# Patient Record
Sex: MALE | Race: WHITE | NOT HISPANIC OR LATINO | Employment: OTHER | URBAN - METROPOLITAN AREA
[De-identification: names, ages, dates, MRNs, and addresses within clinical notes are randomized per-mention and may not be internally consistent; named-entity substitution may affect disease eponyms.]

---

## 2017-03-01 ENCOUNTER — OFFICE VISIT (OUTPATIENT)
Dept: RADIATION ONCOLOGY | Facility: HOSPITAL | Age: 77
End: 2017-03-01

## 2017-03-01 ENCOUNTER — HOSPITAL ENCOUNTER (OUTPATIENT)
Dept: RADIATION ONCOLOGY | Facility: HOSPITAL | Age: 77
Setting detail: RADIATION/ONCOLOGY SERIES
Discharge: HOME | End: 2017-03-01

## 2017-03-01 VITALS
WEIGHT: 175 LBS | BODY MASS INDEX: 28.12 KG/M2 | TEMPERATURE: 98.3 F | RESPIRATION RATE: 16 BRPM | DIASTOLIC BLOOD PRESSURE: 72 MMHG | HEART RATE: 82 BPM | SYSTOLIC BLOOD PRESSURE: 115 MMHG | HEIGHT: 66 IN | OXYGEN SATURATION: 98 %

## 2017-03-01 DIAGNOSIS — C61 PROSTATE CANCER (HCC): Primary | ICD-10-CM

## 2017-03-01 PROCEDURE — G0463 HOSPITAL OUTPT CLINIC VISIT: HCPCS

## 2017-03-01 RX ORDER — MEMANTINE HYDROCHLORIDE 10 MG/1
TABLET ORAL DAILY
COMMUNITY
Start: 2016-12-17 | End: 2018-11-13 | Stop reason: SDUPTHER

## 2017-03-01 RX ORDER — RANITIDINE 150 MG/1
TABLET ORAL 2 TIMES DAILY
COMMUNITY
Start: 2017-02-17 | End: 2018-08-06 | Stop reason: SDUPTHER

## 2017-03-01 NOTE — PROGRESS NOTES
FOLLOW UP NOTE    PATIENT:                                                      Gibson Mansfield  MEDICAL RECORD #:                        [id number]  :                                                          1940  COMPLETION DATE:   2016  DIAGNOSIS:     Prostate cancer    Staging form: Prostate, AJCC V7      Clinical stage from 2016: Stage I (T1c, N0, M0, PSA: Less than 10, Altno <= 6)       BRIEF HISTORY:    Follow-up visit.  Status post CyberKnife SBRT for low risk prostate cancer, Albers's 3+3= 6 with pretreatment PSA 3.1.  He tolerated treatment well.  Bowel disturbance has resolved with minimal residual gas or occasional cramping.  No urinary symptoms.  PSA 2016 was 0.8.    MEDICATIONS: Medication reconciliation for the patient was reviewed and confirmed in the electronic medical record.    Review of Systems   Constitutional: Positive for fatigue.   Gastrointestinal:        Gas/cramping occasionally    Genitourinary: Positive for nocturia.    Neurological: Positive for extremity weakness.   Hematological: Bruises/bleeds easily.       IPSS Questionnaire (AUA-7):  Over the past month…     1) Incomplete Emptying  How often have you had a sensation of not emptying your bladder?  0 - Not at all   2) Frequency  How often have you had to urinate less than every two hours? 0 - Not at all   3) Intermittency  How often have you found you stopped and started again several times when you urinated?  0 - Not at all   4) Urgency  How often have you found it difficult to postpone urination?  0 - Not at all   5) Weak Stream  How often have you had a weak urinary stream?  0 - Not at all   6) Straining  How often have you had to push or strain to begin urination?  0 - Not at all   7) Nocturia  How many times did you typically get up at night to urinate?  2 - 2 times   Total Score: 2         Quality of life due to urinary symptoms:  If you were to spend the rest of your life with your urinary  condition the way it is now, how would you feel about that? 0-Delighted   Urine Leakage (Incontinence) 0-No Leakage      Sexual Health Inventory  Current Status     1) How do you rate your confidence that you could achieve and keep an erection? 1-Very Low   2) When you had erections with sexual stimulation, how often were your erections hard enough for penetration (entering your partner)? 0-No sexual activity   3) During sexual intercourse, how often were you able to maintain your erection after you had penetrated (entered) into your partner? 0-Did not attempt intercourse   4) During sexual intercourse, how difficult was it to maintain your erection to completion of intercourse? 0-Did not attempt intercourse   5) When you attempted sexual intercourse, how often was it satisfactory to you? 0-No sexual activity   Total Score: 1         Bowel Health Inventory  Current Status: 1-Mild diarrhea and/or mild cramping and/or bowel movements more than 5 times daily and/or slight rectal discharge or bleeding             KPS 70%    Physical Exam   Constitutional: He is oriented to person, place, and time. He appears well-developed and well-nourished.   HENT:   Head: Normocephalic and atraumatic.   Neck: Normal range of motion. Neck supple.   Cardiovascular: Normal rate, regular rhythm and normal heart sounds.    No murmur heard.  Pulmonary/Chest: Effort normal and breath sounds normal. He has no wheezes. He has no rales.   Abdominal: Soft. Bowel sounds are normal. He exhibits no distension. There is no hepatosplenomegaly. There is no tenderness.   Genitourinary: Prostate is not enlarged (right lobe is still slightly more rounded and elevated but not enlarged.  No induration.  Seminal vesicles not palpable.) and not tender.   Musculoskeletal: Normal range of motion. He exhibits no edema or tenderness.   Lymphadenopathy:     He has no cervical adenopathy.     He has no axillary adenopathy.        Right: No supraclavicular  "adenopathy present.        Left: No supraclavicular adenopathy present.   Neurological: He is alert and oriented to person, place, and time. He has normal strength. No sensory deficit.   Skin: Skin is warm and dry.   Psychiatric: He has a normal mood and affect. His behavior is normal. Judgment and thought content normal.   Nursing note and vitals reviewed.      VITAL SIGNS:   Vitals:    03/01/17 1446   BP: 115/72   Pulse: 82   Resp: 16   Temp: 98.3 °F (36.8 °C)   TempSrc: Temporal Artery    SpO2: 98%   Weight: 175 lb (79.4 kg)   Height: 66\" (167.6 cm)   PainSc: 0-No pain       The following portions of the patient's history were reviewed and updated as appropriate: allergies, current medications, past family history, past medical history, past social history, past surgical history and problem list.         Prostate cancer [C61]    IMPRESSION:  Prostate cancer, excellent tolerance and early response to SBRT.    RECOMMENDATIONS:  Continue urologic surveillance with Dr. Lehman.     Return in about 1 year (around 3/1/2018).     Ramon Mcgowan MD    Errors in dictation may reflect use of voice recognition software and not all errors in transcription may have been detected prior to signing.  "

## 2017-06-08 DIAGNOSIS — R55 SYNCOPE, UNSPECIFIED SYNCOPE TYPE: Primary | ICD-10-CM

## 2017-06-08 DIAGNOSIS — I48.91 ATRIAL FIBRILLATION, UNSPECIFIED TYPE (HCC): ICD-10-CM

## 2017-06-09 ENCOUNTER — OFFICE VISIT (OUTPATIENT)
Dept: CARDIOLOGY | Facility: CLINIC | Age: 77
End: 2017-06-09

## 2017-06-09 DIAGNOSIS — I48.91 ATRIAL FIBRILLATION, UNSPECIFIED TYPE (HCC): ICD-10-CM

## 2017-06-09 PROCEDURE — 93225 XTRNL ECG REC<48 HRS REC: CPT | Performed by: INTERNAL MEDICINE

## 2017-06-22 ENCOUNTER — OFFICE VISIT (OUTPATIENT)
Dept: CARDIOLOGY | Facility: CLINIC | Age: 77
End: 2017-06-22

## 2017-06-22 DIAGNOSIS — I48.0 PAROXYSMAL ATRIAL FIBRILLATION (HCC): ICD-10-CM

## 2017-06-22 PROCEDURE — 93227 XTRNL ECG REC<48 HR R&I: CPT | Performed by: INTERNAL MEDICINE

## 2017-06-29 ENCOUNTER — OFFICE VISIT (OUTPATIENT)
Dept: CARDIOLOGY | Facility: CLINIC | Age: 77
End: 2017-06-29

## 2017-06-29 VITALS
HEIGHT: 66 IN | WEIGHT: 169.4 LBS | HEART RATE: 68 BPM | BODY MASS INDEX: 27.23 KG/M2 | SYSTOLIC BLOOD PRESSURE: 140 MMHG | DIASTOLIC BLOOD PRESSURE: 72 MMHG

## 2017-06-29 DIAGNOSIS — E78.5 HYPERLIPIDEMIA, UNSPECIFIED HYPERLIPIDEMIA TYPE: ICD-10-CM

## 2017-06-29 DIAGNOSIS — I48.0 PAROXYSMAL ATRIAL FIBRILLATION (HCC): Primary | ICD-10-CM

## 2017-06-29 DIAGNOSIS — I10 ESSENTIAL HYPERTENSION: ICD-10-CM

## 2017-06-29 PROCEDURE — 99213 OFFICE O/P EST LOW 20 MIN: CPT | Performed by: INTERNAL MEDICINE

## 2017-06-29 PROCEDURE — 93000 ELECTROCARDIOGRAM COMPLETE: CPT | Performed by: INTERNAL MEDICINE

## 2017-06-29 NOTE — PROGRESS NOTES
OFFICE FOLLOW UP     06/29/2017     Gibson Mansfield  [address] [facility] [address]  [phone]    1940    Galindo Flor MD    Gibson Mansfield is a 76 y.o. male.    Chief Complaint: Follow-up for Hypertension and Atrial Fibrillation    PROBLEM LIST:  1. Paroxysmal atrial fibrillation:  a. Symptoms of fatigue over the last month with EKG on 08/06/2015 revealing atrial fibrillation with controlled rate.  b. CHADS-VASc = 4.   c. Apixaban therapy instituted.  d. Echo September 2015: EF 55-60%, moderate aortic calcification, mild AI  e. 24-hour Holter, A-fib rate  bpm, rate>120 (intermittent) 17 of 24 hours (6/2017)        1. All symptoms abated after discontinuation of beta blocker.  2. Hypertension.   3. Hyperlipidemia.   4. Diabetes mellitus type 2, diet controlled.   5. Osteoarthritis.   6. Prostate cancer:  a. “Watch and wait” by primary care.   7. History of PUD.   8. Dementia.    Allergies   Allergen Reactions   • Penicillins    • Sulfa Antibiotics          Current Outpatient Prescriptions:   •  apixaban (ELIQUIS) 5 MG tablet tablet, Take  by mouth. As directed, Disp: , Rfl:   •  Cholecalciferol (VITAMIN D-3 PO), Take  by mouth. As directed, Disp: , Rfl:   •  memantine (NAMENDA) 10 MG tablet, , Disp: , Rfl:   •  Multiple Vitamins-Minerals (PRESERVISION AREDS 2 PO), Take  by mouth. As directed, Disp: , Rfl:   •  PRAVASTATIN SODIUM PO, Take  by mouth daily., Disp: , Rfl:   •  raNITIdine (ZANTAC) 150 MG tablet, , Disp: , Rfl:   •  Rivastigmine (EXELON TD), Place  on the skin. As directed, Disp: , Rfl:   •  SPIRONOLACTONE PO, Take  by mouth. As directed, Disp: , Rfl:   •  VORTIOXETINE HBR PO, Take  by mouth. As directed, Disp: , Rfl:     History of Present Illness:    Gibson Mansfield is a 76 y.o. male returning today for a follow-up of PAF, HTN and HLD. The patient is generally improving from a cardiac standpoint. The patient had a 24-hour Holter monitor on 6/09/2017, and at that time he was on Coreg  "and he was not feeling well. After stopping Coreg, he feels much better and does not experience tachypalpitations. See the above Holter report for details of rhythm, rate.      The following portions of the patient's history were reviewed and updated as appropriate: allergies, current medications and problem list.    Pertinent positives as listed in the HPI.  All other systems reviewed and negative.    Objective:     Vitals:    06/29/17 1520 06/29/17 1521   BP: 126/84 140/72   BP Location: Left arm    Patient Position: Sitting    Pulse: 81 68   Weight: 169 lb 6.4 oz (76.8 kg)    Height: 66\" (167.6 cm)        Physical Exam   Constitutional: He is oriented to person, place, and time.   Neck: No JVD present. No thyromegaly present.   Cardiovascular: Normal rate.  An irregularly irregular rhythm present.   Exam reveals no gallop, murmur, or rub.   Pulmonary/Chest:   No wheezes, crackles, or rhonchi.   Musculoskeletal:   No peripheral edema, no clubbing, or cyanosis    Neurological: He is alert and oriented to person, place, and time.   No focal abnormalities in sensation or strength    Vitals reviewed.       Diagnostic Data:      Lab Results (05/25/2017)  Normal CBC and CMP      ECG 12 Lead  Date/Time: 6/29/2017 5:04 PM  Performed by: VINNY DALY  Authorized by: VINNY DALY   Previous ECG: no previous ECG available  Rhythm: atrial fibrillation  Conduction: right bundle branch block  Clinical impression: abnormal ECG            Assessment and Plan:      After I reviewed Mr. Fraser's Holter recording, I felt that he would benefit from some cautious increase in his beta blocker.  Nonetheless, today, he tells me that he stopped all of this beta blockers after the event recorder and that symptoms it completely vanished.  He now feels \"great\".  He is anticoagulated to prevent stroke.  He is normotensive.  Based on his current clinical status I do not think that we need to make further changes are carryout a further " evaluation.  He is reassured of this.    Keep appointment in December    Scribed for Stanley Thomas MD by Coral Glass. 6/29/2017  5:02 PM    I, Stanley Thomas MD, [facility], [facility], personally performed the services described in this documentation as scribed by the above named individual in my presence, and it is both accurate and complete. At 3:58 PM on 06/29/2017    EMR Dragon/Transcription disclaimer:   Much of this encounter note is an electronic transcription/translation of spoken language to printed text. The electronic translation of spoken language may permit erroneous, or at times, nonsensical words or phrases to be inadvertently transcribed; Although I have reviewed the note for such errors, some may still exist.

## 2017-07-05 DIAGNOSIS — R55 SYNCOPE, UNSPECIFIED SYNCOPE TYPE: ICD-10-CM

## 2017-07-05 DIAGNOSIS — I48.91 ATRIAL FIBRILLATION, UNSPECIFIED TYPE (HCC): ICD-10-CM

## 2017-12-14 ENCOUNTER — OFFICE VISIT (OUTPATIENT)
Dept: CARDIOLOGY | Facility: CLINIC | Age: 77
End: 2017-12-14

## 2017-12-14 VITALS
SYSTOLIC BLOOD PRESSURE: 113 MMHG | DIASTOLIC BLOOD PRESSURE: 77 MMHG | BODY MASS INDEX: 28.49 KG/M2 | HEART RATE: 83 BPM | HEIGHT: 65 IN | WEIGHT: 171 LBS

## 2017-12-14 DIAGNOSIS — E78.5 HYPERLIPIDEMIA, UNSPECIFIED HYPERLIPIDEMIA TYPE: ICD-10-CM

## 2017-12-14 DIAGNOSIS — I48.0 PAROXYSMAL ATRIAL FIBRILLATION (HCC): Primary | ICD-10-CM

## 2017-12-14 DIAGNOSIS — I10 ESSENTIAL HYPERTENSION: ICD-10-CM

## 2017-12-14 PROCEDURE — 99213 OFFICE O/P EST LOW 20 MIN: CPT | Performed by: INTERNAL MEDICINE

## 2017-12-14 RX ORDER — VORTIOXETINE 10 MG/1
TABLET, FILM COATED ORAL DAILY
COMMUNITY
Start: 2017-11-15 | End: 2018-11-13

## 2017-12-14 NOTE — PROGRESS NOTES
OFFICE FOLLOW UP     Date of Encounter:2017     Name: Gibson Mansfield  : 1940  Address: [address] [place] [address] [address] [place] [address]  Phone: [phone]    PCP: Galindo Flor MD  [address] [facility] [address]  [facility] [address]    Gibson Mansfield is a 77 y.o. male.    Chief Complaint: Follow up of PAF, HTN, HLD    Problem List:   1. Paroxysmal atrial fibrillation:  a. Symptoms of fatigue over the last month with EKG on 2015 revealing atrial fibrillation with controlled rate.  b. CHADS-VASc = 4.   c. Apixaban therapy instituted.  d. Echo 2015: EF 55-60%, moderate aortic calcification, mild AI  e. 24-hour Holter, A-fib rate  bpm, rate>120 (intermittent) 17 of 24 hours (2017)  f. All symptoms abated after discontinuation of beta blocker.  2. Hypertension.   3. Hyperlipidemia.   4. Diabetes mellitus type 2, diet controlled.   5. Osteoarthritis.   6. Prostate cancer:  a. “Watch and wait” by primary care.   7. History of PUD.   8. Dementia.      Allergies   Allergen Reactions   • Penicillins    • Sulfa Antibiotics        Current Medications:  •  apixaban 5 MG by mouth Every 12 (Twelve) Hours  •  Cholecalciferol by mouth Daily. As directed   •  memantine 10 MG tablet, Daily.  •  Multiple Vitamins-Minerals by mouth Daily. As directed   •  PRAVASTATIN 80 mg by mouth Daily.  •  raNITIdine  150 MG-2 (Two) Times a Day  •  Rivastigmine Place  on the skin. As directed   •  TRINTELLIX 10 MG tablet, Daily.    History of Present Illness:           Mr. Mansfield returns today for 6 month follow up. Since his last visit he reports he has been doing well and remained asymptomatic. He denies palpitations, no chest pain or shortness of breath. He has not had any recent falls. He remains on Eliquis for stroke prophylaxis.     The following portions of the patient's history were reviewed and updated as appropriate: allergies, current medications and problem list.    HPI: Pertinent positives as listed in the  "HPI.  All other systems reviewed and negative.    Objective:  Vitals:    12/14/17 1357 12/14/17 1403 12/14/17 1404   BP: 134/79 127/95 113/77   BP Location: Right arm Right arm Right arm   Patient Position: Sitting Standing Sitting   Pulse: 82 88 83   Weight: 77.6 kg (171 lb)     Height: 165.1 cm (65\")       Physical Exam:  GENERAL: Alert, cooperative, in no acute distress.   HEENT: Fundoscopic deferred, otherwise unremarkable.  NECK: No Jugular venous distention, adenopathy, or thyromegaly noted.   HEART: Irregular rhythm, normal rate, and + systolic murmur, no gallops, or rubs.   LUNGS: Clear to auscultation bilaterally. No wheezing, rales or ronchi.  ABDOMEN: Flat without evidence of organomegaly, masses, or tenderness.  NEUROLOGIC: No focal abnormalities involving strength or sensation are noted.   EXTREMITIES: No clubbing, cyanosis, or edema noted.     Diagnostic Data:  No new labs available.    Procedures      Assessment and Plan:     1. Atrial fibrillation: rate controlled, and anticoagulated with Eliquis 5mg BID  2. HTN: well controlled  3. Continue current medical regimen and follow up in 1 year or sooner as needed.       Scribed for Stanley Thomas MD by Belinda Fontaine PA-C. 12/14/2017 2:14 PM.  I, Stanley Thomas MD, [facility], [facility], personally performed the services described in this documentation as scribed by the above named individual in my presence, and it is both accurate and complete. At 8:56 PM on 12/14/2017               "

## 2018-02-05 ENCOUNTER — HOSPITAL ENCOUNTER (OUTPATIENT)
Dept: CT IMAGING | Facility: HOSPITAL | Age: 78
Discharge: HOME | End: 2018-02-05
Admitting: PHYSICIAN ASSISTANT

## 2018-02-05 DIAGNOSIS — Z91.81 STATUS POST FALL: ICD-10-CM

## 2018-02-05 PROCEDURE — 70450 CT HEAD/BRAIN W/O DYE: CPT

## 2018-03-05 ENCOUNTER — HOSPITAL ENCOUNTER (OUTPATIENT)
Dept: RADIATION ONCOLOGY | Facility: HOSPITAL | Age: 78
Setting detail: RADIATION/ONCOLOGY SERIES
Discharge: HOME | End: 2018-03-05

## 2018-03-05 ENCOUNTER — OFFICE VISIT (OUTPATIENT)
Dept: RADIATION ONCOLOGY | Facility: HOSPITAL | Age: 78
End: 2018-03-05

## 2018-03-05 VITALS
TEMPERATURE: 98.3 F | WEIGHT: 177.5 LBS | DIASTOLIC BLOOD PRESSURE: 88 MMHG | HEART RATE: 95 BPM | BODY MASS INDEX: 29.54 KG/M2 | RESPIRATION RATE: 20 BRPM | OXYGEN SATURATION: 97 % | SYSTOLIC BLOOD PRESSURE: 175 MMHG

## 2018-03-05 DIAGNOSIS — C61 PROSTATE CANCER (HCC): Primary | ICD-10-CM

## 2018-03-05 RX ORDER — PRAVASTATIN SODIUM 80 MG/1
TABLET ORAL
COMMUNITY
Start: 2018-01-24 | End: 2018-08-06 | Stop reason: SDUPTHER

## 2018-03-05 NOTE — PROGRESS NOTES
FOLLOW UP NOTE    PATIENT:                                                      Gibson Mansfield  MEDICAL RECORD #:                        [id number]  :                                                          1940  COMPLETION DATE:    16  DIAGNOSIS:     Prostate cancer    Staging form: Prostate, AJCC V7    - Clinical stage from 2016: Stage I (T1c, N0, M0, PSA: Less than 10, Alton <= 6) - Signed by Ramon Mcgowan MD on 2016      BRIEF HISTORY:    Routine follow-up visit status post CyberKnife SBRT for low risk prostate cancer.  Most recent PSA was 0.1 ng/ml.    He's having no urinary symptoms, bowel symptoms, progressive pain or other complaints.      MEDICATIONS: Medication reconciliation for the patient was reviewed and confirmed in the electronic medical record.    Review of Systems   Constitutional: Positive for fatigue.   HENT:   Positive for hearing loss and tinnitus.    Eyes: Negative.    Respiratory: Positive for cough (sinus drainage).    Cardiovascular: Negative.    Gastrointestinal: Negative.    Endocrine: Negative.    Genitourinary: Negative.     Musculoskeletal: Positive for back pain.   Skin: Negative.    Neurological: Positive for dizziness.   Hematological: Bruises/bleeds easily.   Psychiatric/Behavioral: Negative.             IPSS Questionnaire (AUA-7):  Over the past month…      1) Incomplete Emptying  How often have you had a sensation of not emptying your bladder?  0 - Not at all   2) Frequency  How often have you had to urinate less than every two hours? 0 - Not at all   3) Intermittency  How often have you found you stopped and started again several times when you urinated?  0 - Not at all   4) Urgency  How often have you found it difficult to postpone urination?  0 - Not at all   5) Weak Stream  How often have you had a weak urinary stream?  0 - Not at all   6) Straining  How often have you had to push or strain to begin urination?  0 - Not at all   7) Nocturia  How  many times did you typically get up at night to urinate?  2 - 2 times   Total Score: 2           Quality of life due to urinary symptoms:  If you were to spend the rest of your life with your urinary condition the way it is now, how would you feel about that? 0-Delighted   Urine Leakage (Incontinence) 0-No Leakage       Sexual Health Inventory  Current Status      1) How do you rate your confidence that you could achieve and keep an erection? 1-Very Low   2) When you had erections with sexual stimulation, how often were your erections hard enough for penetration (entering your partner)? 0-No sexual activity   3) During sexual intercourse, how often were you able to maintain your erection after you had penetrated (entered) into your partner? 0-Did not attempt intercourse   4) During sexual intercourse, how difficult was it to maintain your erection to completion of intercourse? 0-Did not attempt intercourse   5) When you attempted sexual intercourse, how often was it satisfactory to you? 0-No sexual activity   Total Score: 1           Bowel Health Inventory  Current Status: 1-Mild diarrhea and/or mild cramping and/or bowel movements more than 5 times daily and/or slight rectal discharge or bleeding                   KPS 70%    Physical Exam   Constitutional: He is oriented to person, place, and time. He appears well-developed and well-nourished.   HENT:   Head: Normocephalic and atraumatic.   Neck: Normal range of motion. Neck supple.   Cardiovascular: Normal rate, regular rhythm and normal heart sounds.    No murmur heard.  Pulmonary/Chest: Effort normal and breath sounds normal. He has no wheezes. He has no rales.   Abdominal: Soft. Bowel sounds are normal. He exhibits no distension. There is no hepatosplenomegaly. There is no tenderness.   Genitourinary: Prostate is not enlarged and not tender.   Musculoskeletal: Normal range of motion. He exhibits no edema or tenderness.   Lymphadenopathy:     He has no cervical  adenopathy.     He has no axillary adenopathy.        Right: No supraclavicular adenopathy present.        Left: No supraclavicular adenopathy present.   Neurological: He is alert and oriented to person, place, and time. He has normal strength. No sensory deficit.   Skin: Skin is warm and dry.   Psychiatric: He has a normal mood and affect. His behavior is normal. Judgment and thought content normal.   Nursing note and vitals reviewed.      VITAL SIGNS:   Vitals:    03/05/18 1416   BP: 175/88   Pulse: 95   Resp: 20   Temp: 98.3 °F (36.8 °C)   TempSrc: Temporal Artery    SpO2: 97%   Weight: 80.5 kg (177 lb 8 oz)   PainSc: 0-No pain       The following portions of the patient's history were reviewed and updated as appropriate: allergies, current medications, past family history, past medical history, past social history, past surgical history and problem list.         Gibson was seen today for prostate cancer.    Diagnoses and all orders for this visit:    Prostate cancer       IMPRESSION:  Prostate cancer, Cleves score 3+3 = 6, 1 1/2 years after definitive treatment with SBRT.    He is in biochemical remission status.  He maintains excellent urinary function.    RECOMMENDATIONS:  He plans to continue urologic surveillance under the care of Dr. Lehman and his primary physician.      Return if symptoms worsen or fail to improve.    Ramon Mcgowan MD    Errors in dictation may reflect use of voice recognition software and not all errors in transcription may have been detected prior to signing.

## 2018-05-17 ENCOUNTER — OFFICE VISIT (OUTPATIENT)
Dept: FAMILY MEDICINE CLINIC | Facility: CLINIC | Age: 78
End: 2018-05-17

## 2018-05-17 VITALS
BODY MASS INDEX: 28 KG/M2 | WEIGHT: 174.2 LBS | HEART RATE: 79 BPM | RESPIRATION RATE: 18 BRPM | DIASTOLIC BLOOD PRESSURE: 72 MMHG | SYSTOLIC BLOOD PRESSURE: 128 MMHG | OXYGEN SATURATION: 98 % | HEIGHT: 66 IN

## 2018-05-17 DIAGNOSIS — R79.9 ABNORMAL FINDING OF BLOOD CHEMISTRY: ICD-10-CM

## 2018-05-17 DIAGNOSIS — I48.0 PAROXYSMAL ATRIAL FIBRILLATION (HCC): ICD-10-CM

## 2018-05-17 DIAGNOSIS — E78.01 FAMILIAL HYPERCHOLESTEROLEMIA: ICD-10-CM

## 2018-05-17 DIAGNOSIS — I10 ESSENTIAL HYPERTENSION: Primary | ICD-10-CM

## 2018-05-17 DIAGNOSIS — F01.50 VASCULAR DEMENTIA WITHOUT BEHAVIORAL DISTURBANCE (HCC): ICD-10-CM

## 2018-05-17 DIAGNOSIS — Z23 IMMUNIZATION DUE: ICD-10-CM

## 2018-05-17 LAB
ALBUMIN SERPL-MCNC: 4 G/DL (ref 3.2–4.8)
ALBUMIN/GLOB SERPL: 1.5 G/DL (ref 1.5–2.5)
ALP SERPL-CCNC: 87 U/L (ref 25–100)
ALT SERPL W P-5'-P-CCNC: 28 U/L (ref 7–40)
ANION GAP SERPL CALCULATED.3IONS-SCNC: 8 MMOL/L (ref 3–11)
ARTICHOKE IGE QN: 95 MG/DL (ref 0–130)
AST SERPL-CCNC: 29 U/L (ref 0–33)
BASOPHILS # BLD AUTO: 0.03 10*3/MM3 (ref 0–0.2)
BASOPHILS NFR BLD AUTO: 0.6 % (ref 0–1)
BILIRUB SERPL-MCNC: 0.7 MG/DL (ref 0.3–1.2)
BUN BLD-MCNC: 14 MG/DL (ref 9–23)
BUN/CREAT SERPL: 14 (ref 7–25)
CALCIUM SPEC-SCNC: 9.5 MG/DL (ref 8.7–10.4)
CHLORIDE SERPL-SCNC: 108 MMOL/L (ref 99–109)
CHOLEST SERPL-MCNC: 159 MG/DL (ref 0–200)
CO2 SERPL-SCNC: 28 MMOL/L (ref 20–31)
CREAT BLD-MCNC: 1 MG/DL (ref 0.6–1.3)
DEPRECATED RDW RBC AUTO: 48.2 FL (ref 37–54)
EOSINOPHIL # BLD AUTO: 0.08 10*3/MM3 (ref 0–0.3)
EOSINOPHIL NFR BLD AUTO: 1.5 % (ref 0–3)
ERYTHROCYTE [DISTWIDTH] IN BLOOD BY AUTOMATED COUNT: 14.2 % (ref 11.3–14.5)
GFR SERPL CREATININE-BSD FRML MDRD: 72 ML/MIN/1.73
GLOBULIN UR ELPH-MCNC: 2.6 GM/DL
GLUCOSE BLD-MCNC: 107 MG/DL (ref 70–100)
HBA1C MFR BLD: 5.2 % (ref 4.8–5.6)
HCT VFR BLD AUTO: 40.3 % (ref 38.9–50.9)
HDLC SERPL-MCNC: 55 MG/DL (ref 40–60)
HGB BLD-MCNC: 12.6 G/DL (ref 13.1–17.5)
IMM GRANULOCYTES # BLD: 0 10*3/MM3 (ref 0–0.03)
IMM GRANULOCYTES NFR BLD: 0 % (ref 0–0.6)
LYMPHOCYTES # BLD AUTO: 0.77 10*3/MM3 (ref 0.6–4.8)
LYMPHOCYTES NFR BLD AUTO: 14.7 % (ref 24–44)
MCH RBC QN AUTO: 29.4 PG (ref 27–31)
MCHC RBC AUTO-ENTMCNC: 31.3 G/DL (ref 32–36)
MCV RBC AUTO: 93.9 FL (ref 80–99)
MONOCYTES # BLD AUTO: 0.38 10*3/MM3 (ref 0–1)
MONOCYTES NFR BLD AUTO: 7.2 % (ref 0–12)
NEUTROPHILS # BLD AUTO: 3.99 10*3/MM3 (ref 1.5–8.3)
NEUTROPHILS NFR BLD AUTO: 76 % (ref 41–71)
PLATELET # BLD AUTO: 137 10*3/MM3 (ref 150–450)
PMV BLD AUTO: 11.6 FL (ref 6–12)
POTASSIUM BLD-SCNC: 4.2 MMOL/L (ref 3.5–5.5)
PROT SERPL-MCNC: 6.6 G/DL (ref 5.7–8.2)
RBC # BLD AUTO: 4.29 10*6/MM3 (ref 4.2–5.76)
SODIUM BLD-SCNC: 144 MMOL/L (ref 132–146)
TRIGL SERPL-MCNC: 101 MG/DL (ref 0–150)
TSH SERPL DL<=0.05 MIU/L-ACNC: 1.3 MIU/ML (ref 0.35–5.35)
URATE SERPL-MCNC: 6.7 MG/DL (ref 3.7–9.2)
WBC NRBC COR # BLD: 5.25 10*3/MM3 (ref 3.5–10.8)

## 2018-05-17 PROCEDURE — 80053 COMPREHEN METABOLIC PANEL: CPT | Performed by: FAMILY MEDICINE

## 2018-05-17 PROCEDURE — 90732 PPSV23 VACC 2 YRS+ SUBQ/IM: CPT | Performed by: FAMILY MEDICINE

## 2018-05-17 PROCEDURE — G0009 ADMIN PNEUMOCOCCAL VACCINE: HCPCS | Performed by: FAMILY MEDICINE

## 2018-05-17 PROCEDURE — 85025 COMPLETE CBC W/AUTO DIFF WBC: CPT | Performed by: FAMILY MEDICINE

## 2018-05-17 PROCEDURE — 84550 ASSAY OF BLOOD/URIC ACID: CPT | Performed by: FAMILY MEDICINE

## 2018-05-17 PROCEDURE — 84443 ASSAY THYROID STIM HORMONE: CPT | Performed by: FAMILY MEDICINE

## 2018-05-17 PROCEDURE — 83036 HEMOGLOBIN GLYCOSYLATED A1C: CPT | Performed by: FAMILY MEDICINE

## 2018-05-17 PROCEDURE — 99204 OFFICE O/P NEW MOD 45 MIN: CPT | Performed by: FAMILY MEDICINE

## 2018-05-17 PROCEDURE — 80061 LIPID PANEL: CPT | Performed by: FAMILY MEDICINE

## 2018-05-17 PROCEDURE — 36415 COLL VENOUS BLD VENIPUNCTURE: CPT | Performed by: FAMILY MEDICINE

## 2018-05-17 NOTE — PROGRESS NOTES
Kolby Mansfield is a 77 y.o. male.     History of Present Illness   He is a new patient.  He is accompanied by his wife Lynette of 58 years.  He describes previous and recent care with Dr. Gavin Flor (Internist), Dr. GLADYS Thomas (Cardiologist), Dr. Ramon Mcgowan (Radiation Oncology) and Dr. Lehman (Urology).  He is here to follow up on his chronic medical problems which include HTN, HLD, Afib and dementia.  He had an AWME on October 24, 2017 by Dr. Flor but did not receive his PPV23.  He is tolerating all of his medications well with no adverse events.  He reports good blood pressure control off of his previously prescribed blood pressure medication.  He denies bleeding, unusual headaches, chest pain or acute dyspnea.  He tries to follow a heart healthy diet and walks for exercise.  He is interested in updating his immunizations.  He is anticipating some lab evaluation.  He is needing some medication refills.  He voices no acute symptoms.    Review of Systems   Constitutional: Negative for chills and fever.   HENT: Negative for mouth sores, nosebleeds and trouble swallowing.    Eyes: Negative for visual disturbance.   Respiratory: Negative for cough and shortness of breath.    Cardiovascular: Negative for chest pain, palpitations and leg swelling.   Gastrointestinal: Negative for abdominal pain, diarrhea, nausea and vomiting.   Genitourinary: Negative for difficulty urinating.   Musculoskeletal: Positive for back pain. Negative for arthralgias.   Skin: Negative for rash.   Neurological: Negative for headaches.   Hematological: Does not bruise/bleed easily.   Psychiatric/Behavioral: The patient is not nervous/anxious.    All other systems reviewed and are negative.      Objective   Physical Exam   Constitutional: He is oriented to person, place, and time. He appears well-developed and well-nourished. He is cooperative.   HENT:   Head: Normocephalic.   Right Ear: External ear normal. Decreased hearing is  noted.   Left Ear: External ear normal. Decreased hearing is noted.   Nose: Nose normal.   Mouth/Throat: Oropharynx is clear and moist and mucous membranes are normal.   Hearing aids   Eyes: Conjunctivae and EOM are normal. Pupils are equal, round, and reactive to light. No scleral icterus.   Neck: Trachea normal and normal range of motion. Neck supple. No JVD present. Carotid bruit is not present. No thyromegaly present.   Cardiovascular: Normal rate and intact distal pulses.  An irregular rhythm present. Exam reveals distant heart sounds.    Pulmonary/Chest: Effort normal and breath sounds normal. He has no wheezes.   Abdominal: Soft. Bowel sounds are normal. There is no hepatosplenomegaly. There is no tenderness.   Musculoskeletal: Normal range of motion.        Thoracic back: He exhibits deformity.   Lymphadenopathy:     He has no cervical adenopathy.   Neurological: He is alert and oriented to person, place, and time. He has normal strength and normal reflexes. No sensory deficit. Gait abnormal.   Skin: Skin is warm and dry.   Psychiatric: He has a normal mood and affect. His speech is normal and behavior is normal. Judgment and thought content normal. Cognition and memory are normal.   Nursing note and vitals reviewed.      Assessment/Plan   Diagnoses and all orders for this visit:    Essential hypertension  -     POC Urinalysis Dipstick, Automated  -     Comprehensive Metabolic Panel  -     CBC & Differential  -     Lipid Panel  -     TSH  -     Hemoglobin A1c  -     Uric Acid  - Healthy heart diet  - Daily aerobic exercise  - Routine blood pressure monitoring  - [place] Heart Disease and Stroke Prevention Task Force pamphlet reviewed and administered.    Familial hypercholesterolemia  -     POC Urinalysis Dipstick, Automated  -     Comprehensive Metabolic Panel  -     Lipid Panel  - Pravastatin  - Low cholesterol diet (< 200 mg / day)  - Daily aerobic exercise  - Aspirin 81 mg po once daily    Paroxysmal  atrial fibrillation  -     Comprehensive Metabolic Panel  -     CBC & Differential  -     TSH  -     Hemoglobin A1c  - Eliquis  - Report any excessive bruising or bleeding concerns    Vascular dementia without behavioral disturbance  -     POC Urinalysis Dipstick, Automated  -     Comprehensive Metabolic Panel  -     CBC & Differential  -     Lipid Panel  -     TSH  -     Hemoglobin A1c  -     Uric Acid  -     Namenda  - Exelon  - Trintellix    Immunization due  -     Pneumococcal Polysaccharide Vaccine 23-Valent IM  - Vaccine counseling and current VIS is provided for immunizations administered today.    RTC in 6 months.

## 2018-07-09 ENCOUNTER — OFFICE VISIT (OUTPATIENT)
Dept: FAMILY MEDICINE CLINIC | Facility: CLINIC | Age: 78
End: 2018-07-09

## 2018-07-09 VITALS
HEART RATE: 71 BPM | BODY MASS INDEX: 27.97 KG/M2 | RESPIRATION RATE: 18 BRPM | HEIGHT: 66 IN | WEIGHT: 174 LBS | TEMPERATURE: 98 F | SYSTOLIC BLOOD PRESSURE: 130 MMHG | OXYGEN SATURATION: 99 % | DIASTOLIC BLOOD PRESSURE: 78 MMHG

## 2018-07-09 DIAGNOSIS — G95.9 LUMBAR MYELOPATHY (HCC): Primary | ICD-10-CM

## 2018-07-09 PROCEDURE — 99214 OFFICE O/P EST MOD 30 MIN: CPT | Performed by: FAMILY MEDICINE

## 2018-07-09 NOTE — PROGRESS NOTES
"Subjective   Gibson Mansfield is a 77 y.o. male.     History of Present Illness   The patient is here today with his.  States he is getting around very little these days.  States he is having weakness in his knees and hips. States in February he was in the bathroom and fell backward into the tub and has weakness since that time. Has gradually getting worse.  States he is having trouble getting in and out of the car.  He has had back surgery on 3 different time. The last one in 2005.  Denies any chest pain or shortness of breath.    Also c/o right ear drainage.      The following portions of the patient's history were reviewed and updated as appropriate: allergies, current medications, past social history and problem list.    Review of Systems   Respiratory: Negative for shortness of breath.    Cardiovascular: Negative for chest pain.   Neurological: Positive for weakness (legs).       Objective   /78   Pulse 71   Temp 98 °F (36.7 °C) (Tympanic)   Resp 18   Ht 167.6 cm (66\")   Wt 78.9 kg (174 lb)   SpO2 99%   BMI 28.08 kg/m²   Physical Exam   Constitutional: He is oriented to person, place, and time. He appears well-developed and well-nourished.   HENT:   Right Ear: There is drainage.   Left Ear: External ear normal.   Cardiovascular: Normal rate and regular rhythm.    Pulmonary/Chest: Effort normal and breath sounds normal.   Musculoskeletal:   Severe kyphoscoliosis.  Diminished deep tendon reflexes at the ankles and knees.  Decreased sensation in both lower extremities.   Neurological: He is alert and oriented to person, place, and time. He displays abnormal reflex.   The patient has grade 2 out of 5 strength with extension at the hips.  This is symmetrical.  He has a grade 2/5 flexion strength at both hips.  This is symmetrical.  He has grade 2/5 extension strength at each knee.  This is symmetrical.  His grade 2/5 flexion strength of both knees.  This is symmetrical as well as.   Nursing note and " vitals reviewed.      Assessment/Plan   Problem List Items Addressed This Visit     None      Visit Diagnoses     Lumbar myelopathy (CMS/HCC)    -  Primary      I believe the patient has a lumbar myelopathy causing the sensory changes in both lower extremities as well as a weakness in both lower extremities.  I suspect that these findings are due to his severe lumbar scoliosis.        Drink plenty fluids.    Do some leg strengthening exercises daily.    Check an MRI of the lumbar spine.    Refer to Physical Therapy.    Follow up as needed.                Scribed for Dr Neo Cottrell by Enma Douglas CMA.          I, Neo Cottrell MD, personally performed the services described in this documentation, as scribed by Enma Douglas in my presence, and is both accurate and complete.

## 2018-07-12 ENCOUNTER — HOSPITAL ENCOUNTER (OUTPATIENT)
Dept: MRI IMAGING | Facility: HOSPITAL | Age: 78
Discharge: HOME | End: 2018-07-12
Attending: FAMILY MEDICINE | Admitting: FAMILY MEDICINE

## 2018-07-12 DIAGNOSIS — G95.9 LUMBAR MYELOPATHY (HCC): ICD-10-CM

## 2018-07-12 PROCEDURE — 72148 MRI LUMBAR SPINE W/O DYE: CPT

## 2018-07-18 DIAGNOSIS — R93.7 ABNORMAL MRI, LUMBAR SPINE: ICD-10-CM

## 2018-07-18 DIAGNOSIS — R29.898 WEAKNESS OF BOTH LEGS: Primary | ICD-10-CM

## 2018-08-06 ENCOUNTER — TELEPHONE (OUTPATIENT)
Dept: FAMILY MEDICINE CLINIC | Facility: CLINIC | Age: 78
End: 2018-08-06

## 2018-08-06 RX ORDER — RANITIDINE 150 MG/1
150 TABLET ORAL NIGHTLY
Qty: 90 TABLET | Refills: 2 | Status: SHIPPED | OUTPATIENT
Start: 2018-08-06 | End: 2019-01-01

## 2018-08-06 RX ORDER — PRAVASTATIN SODIUM 80 MG/1
80 TABLET ORAL DAILY
Qty: 90 TABLET | Refills: 2 | Status: SHIPPED | OUTPATIENT
Start: 2018-08-06 | End: 2019-01-01 | Stop reason: HOSPADM

## 2018-08-06 NOTE — TELEPHONE ENCOUNTER
----- Message from Nika Merrill MA sent at 8/6/2018  9:22 AM EDT -----  Contact: [phone]  Patient called requesting refills of Pravastatin and Ranitidine  Jameel

## 2018-08-08 ENCOUNTER — OFFICE VISIT (OUTPATIENT)
Dept: NEUROSURGERY | Facility: CLINIC | Age: 78
End: 2018-08-08

## 2018-08-08 VITALS — WEIGHT: 178 LBS | TEMPERATURE: 97.9 F | BODY MASS INDEX: 28.61 KG/M2 | HEIGHT: 66 IN

## 2018-08-08 DIAGNOSIS — M47.816 LUMBAR SPONDYLOSIS: ICD-10-CM

## 2018-08-08 DIAGNOSIS — R26.9 GAIT DIFFICULTY: ICD-10-CM

## 2018-08-08 DIAGNOSIS — R53.1 GENERALIZED WEAKNESS: Primary | ICD-10-CM

## 2018-08-08 DIAGNOSIS — M51.36 DDD (DEGENERATIVE DISC DISEASE), LUMBAR: ICD-10-CM

## 2018-08-08 PROCEDURE — 99203 OFFICE O/P NEW LOW 30 MIN: CPT | Performed by: NEUROLOGICAL SURGERY

## 2018-08-08 NOTE — PROGRESS NOTES
Patient: Gibson Manfsield  : 1940    Primary Care Provider: Samson Richard MD    Requesting Provider: As above        History    Chief Complaint: Lower extremity weakness and imbalance.    History of Present Illness: The patient is a 77-year-old retired gentleman has undergone 3 prior lumbar fusions by Dr. Fowler.  In February he fell and since then his balance has been poor and he's had difficulty with weakness in his legs.  He denies any numbness.  He has no back or leg pain.  He has no neck pain.  He has no voiding difficulties.  In many ways he simply feels weak all over.    Review of Systems   Constitutional: Positive for fatigue. Negative for activity change, appetite change, chills, diaphoresis, fever and unexpected weight change.   HENT: Negative for congestion, dental problem, drooling, ear discharge, ear pain, facial swelling, hearing loss, mouth sores, nosebleeds, postnasal drip, rhinorrhea, sinus pressure, sneezing, sore throat, tinnitus, trouble swallowing and voice change.    Eyes: Negative for photophobia, pain, discharge, redness, itching and visual disturbance.   Respiratory: Negative for apnea, cough, choking, chest tightness, shortness of breath, wheezing and stridor.    Cardiovascular: Positive for palpitations. Negative for chest pain and leg swelling.   Gastrointestinal: Negative for abdominal distention, abdominal pain, anal bleeding, blood in stool, constipation, diarrhea, nausea, rectal pain and vomiting.   Musculoskeletal: Positive for gait problem. Negative for arthralgias, back pain, joint swelling, myalgias, neck pain and neck stiffness.   Skin: Negative for color change, pallor, rash and wound.   Allergic/Immunologic: Negative for environmental allergies, food allergies and immunocompromised state.   Neurological: Positive for weakness. Negative for dizziness, tremors, seizures, syncope, facial asymmetry, speech difficulty, light-headedness, numbness and headaches.  "  Hematological: Negative for adenopathy. Does not bruise/bleed easily.   Psychiatric/Behavioral: Negative for agitation, behavioral problems, confusion, decreased concentration, dysphoric mood, self-injury, sleep disturbance and suicidal ideas. The patient is not nervous/anxious and is not hyperactive.        The patient's past medical history, past surgical history, family history, and social history have been reviewed at length in the electronic medical record.    Physical Exam:   Temp 97.9 °F (36.6 °C)   Ht 167.6 cm (66\")   Wt 80.7 kg (178 lb)   BMI 28.73 kg/m²   CONSTITUTIONAL: Patient is well-nourished, pleasant and appears stated age.  CV: Heart regular rate and rhythm without murmur, rub, or gallop.  Pedal pulses are distant but his feet are warm to touch.  PULMONARY: Lungs are clear to ascultation.  MUSCULOSKELETAL:  Neck tenderness to palpation is not observed.   ROM in neck is normal.  He harbors a moderate thoracic kyphosis.  Straight leg raising is negative.  Panda's signs are negative.  NEUROLOGICAL:  Orientation, memory, attention span, language function, and cognition have been examined and are intact.  Strength is intact in the upper and lower extremities to direct testing.  Muscle tone is normal throughout.  Station and gait are stooped and unsteady.  Sensation is intact to light touch testing throughout.  Deep tendon reflexes are difficult to elicit throughout.  Juan Manuel's Sign is negative bilaterally. No clonus is elicited.  Coordination is intact.      Medical Decision Making    Data Review:   Lumbar MRI demonstrates diffuse degenerative disc disease and spondylosis.  There's some mild recess narrowing on the left at L5-S1.  Otherwise there is no canal or root compromise whatsoever.    Diagnosis:   There are no finding is in his lumbar spine that would explain his gait difficulty and generalized weakness.  I don't see any long tract signs on examination to suggest a myelopathy.    Treatment " Options:   The patient has been referred to physical therapy and I've asked that they go ahead and institute those visits.  One might consider some peripheral vascular studies to see whether he has any arterial occlusive disease but once again his lack of pain makes that less likely.  I don't see anything in terms of his spine that warrants surgical intervention.       Diagnosis Plan   1. Generalized weakness     2. Gait difficulty     3. Lumbar spondylosis     4. DDD (degenerative disc disease), lumbar             I, Dr. Costello, personally performed the services described in the documentation, as scribed in my presence, and it is both accurate and complete.  Scribed for Alberto Costello MD by Flaco Sharpe CMA on 08/08/2018 at 2:09 PM

## 2018-09-14 RX ORDER — RIVASTIGMINE 4.6 MG/24H
1 PATCH, EXTENDED RELEASE TRANSDERMAL DAILY
Qty: 30 PATCH | Refills: 3 | Status: SHIPPED | OUTPATIENT
Start: 2018-09-14 | End: 2019-01-11 | Stop reason: SDUPTHER

## 2018-09-17 ENCOUNTER — TELEPHONE (OUTPATIENT)
Dept: FAMILY MEDICINE CLINIC | Facility: CLINIC | Age: 78
End: 2018-09-17

## 2018-09-17 NOTE — TELEPHONE ENCOUNTER
PT ORDER HAS BEEN FAXED PER DR SHIPLEY----- Message from Mora Rodriguez sent at 9/17/2018  2:33 PM EDT -----  Contact: [phone]  Pt needs a new pt order for ble weakness scoliosis and ddd, [facility] in [place] they would like to have it faxed to that hospital at [phone]    Please call his wife and let her know when this done

## 2018-10-29 ENCOUNTER — HOSPITAL ENCOUNTER (OUTPATIENT)
Dept: GENERAL RADIOLOGY | Facility: HOSPITAL | Age: 78
Discharge: HOME | End: 2018-10-29
Attending: FAMILY MEDICINE | Admitting: FAMILY MEDICINE

## 2018-10-29 ENCOUNTER — OFFICE VISIT (OUTPATIENT)
Dept: FAMILY MEDICINE CLINIC | Facility: CLINIC | Age: 78
End: 2018-10-29

## 2018-10-29 VITALS
HEART RATE: 88 BPM | HEIGHT: 66 IN | WEIGHT: 176 LBS | TEMPERATURE: 98.1 F | BODY MASS INDEX: 28.28 KG/M2 | OXYGEN SATURATION: 99 % | SYSTOLIC BLOOD PRESSURE: 138 MMHG | DIASTOLIC BLOOD PRESSURE: 80 MMHG | RESPIRATION RATE: 22 BRPM

## 2018-10-29 DIAGNOSIS — M79.18 PAIN IN LEFT BUTTOCK: ICD-10-CM

## 2018-10-29 DIAGNOSIS — M25.361 PATELLOFEMORAL INSTABILITY OF BOTH KNEES WITH PAIN: ICD-10-CM

## 2018-10-29 DIAGNOSIS — M25.561 PATELLOFEMORAL INSTABILITY OF BOTH KNEES WITH PAIN: ICD-10-CM

## 2018-10-29 DIAGNOSIS — S30.0XXA TRAUMATIC HEMATOMA OF BUTTOCK, INITIAL ENCOUNTER: Primary | ICD-10-CM

## 2018-10-29 DIAGNOSIS — M25.362 PATELLOFEMORAL INSTABILITY OF BOTH KNEES WITH PAIN: ICD-10-CM

## 2018-10-29 DIAGNOSIS — M25.562 PATELLOFEMORAL INSTABILITY OF BOTH KNEES WITH PAIN: ICD-10-CM

## 2018-10-29 PROCEDURE — 99213 OFFICE O/P EST LOW 20 MIN: CPT | Performed by: FAMILY MEDICINE

## 2018-10-29 PROCEDURE — 72170 X-RAY EXAM OF PELVIS: CPT

## 2018-11-12 ENCOUNTER — OFFICE VISIT (OUTPATIENT)
Dept: ORTHOPEDIC SURGERY | Facility: CLINIC | Age: 78
End: 2018-11-12

## 2018-11-12 VITALS — BODY MASS INDEX: 28.27 KG/M2 | HEART RATE: 59 BPM | WEIGHT: 175.93 LBS | HEIGHT: 66 IN | OXYGEN SATURATION: 91 %

## 2018-11-12 DIAGNOSIS — G89.29 CHRONIC PAIN OF BOTH KNEES: Primary | ICD-10-CM

## 2018-11-12 DIAGNOSIS — M25.562 CHRONIC PAIN OF BOTH KNEES: Primary | ICD-10-CM

## 2018-11-12 DIAGNOSIS — M17.0 PRIMARY OSTEOARTHRITIS OF BOTH KNEES: ICD-10-CM

## 2018-11-12 DIAGNOSIS — M25.561 CHRONIC PAIN OF BOTH KNEES: Primary | ICD-10-CM

## 2018-11-12 PROCEDURE — 20610 DRAIN/INJ JOINT/BURSA W/O US: CPT | Performed by: ORTHOPAEDIC SURGERY

## 2018-11-12 PROCEDURE — 99204 OFFICE O/P NEW MOD 45 MIN: CPT | Performed by: ORTHOPAEDIC SURGERY

## 2018-11-12 RX ORDER — BUPIVACAINE HYDROCHLORIDE 2.5 MG/ML
4 INJECTION, SOLUTION INFILTRATION; PERINEURAL
Status: COMPLETED | OUTPATIENT
Start: 2018-11-12 | End: 2018-11-12

## 2018-11-12 RX ORDER — BETAMETHASONE SODIUM PHOSPHATE AND BETAMETHASONE ACETATE 3; 3 MG/ML; MG/ML
6 INJECTION, SUSPENSION INTRA-ARTICULAR; INTRALESIONAL; INTRAMUSCULAR; SOFT TISSUE
Status: COMPLETED | OUTPATIENT
Start: 2018-11-12 | End: 2018-11-12

## 2018-11-12 RX ORDER — LIDOCAINE HYDROCHLORIDE 10 MG/ML
4 INJECTION, SOLUTION EPIDURAL; INFILTRATION; INTRACAUDAL; PERINEURAL
Status: COMPLETED | OUTPATIENT
Start: 2018-11-12 | End: 2018-11-12

## 2018-11-12 RX ADMIN — BETAMETHASONE SODIUM PHOSPHATE AND BETAMETHASONE ACETATE 6 MG: 3; 3 INJECTION, SUSPENSION INTRA-ARTICULAR; INTRALESIONAL; INTRAMUSCULAR; SOFT TISSUE at 13:26

## 2018-11-12 RX ADMIN — LIDOCAINE HYDROCHLORIDE 4 ML: 10 INJECTION, SOLUTION EPIDURAL; INFILTRATION; INTRACAUDAL; PERINEURAL at 13:26

## 2018-11-12 RX ADMIN — BUPIVACAINE HYDROCHLORIDE 4 ML: 2.5 INJECTION, SOLUTION INFILTRATION; PERINEURAL at 13:26

## 2018-11-12 NOTE — PROGRESS NOTES
[facility] Note    Subjective     Chief Complaint   Patient presents with   • Right Knee - Pain   • Left Knee - Pain        HPI      Gibson Mansfield is a 78 y.o. male.  He complains of bilateral knee pain.  He's had it for years.  Right worse than left.  His pain is aching.  Pain is 3 out of 10.  He takes occasional Tylenol.  He walks with a limp.    Past Medical History:   Diagnosis Date   • Atrial fibrillation (CMS/HCC)    • Blindness    • Chronic anticoagulation    • COPD (chronic obstructive pulmonary disease) (CMS/HCC)    • Dementia    • GERD (gastroesophageal reflux disease)    • History of prostate cancer    • History of stroke    • HLD (hyperlipidemia)    • [place] (hard of hearing)    • HTN (hypertension)    • Legally blind    • Macular degeneration of both eyes    • Osteoarthritis of lumbar spine       Past Surgical History:   Procedure Laterality Date   • CATARACT EXTRACTION Bilateral 2016   • COLONOSCOPY  2013   • LUMBAR DISC SURGERY  2000    Levels unknown, Dr. Decker   • LUMBAR DISCECTOMY  2005    Levels unknown, Dr. Decker   • LUMBAR FUSION  1990    Levels unknown, Dr. Decker   • PROSTATE FIDUCIAL MARKER PLACEMENT  2016      Family History   Problem Relation Age of Onset   • Diabetes Mother    • Breast cancer Mother    • Heart disease Father      Social History     Socioeconomic History   • Marital status:      Spouse name: Lynette   • Number of children: 2   • Years of education: H.S.   • Highest education level: Not on file   Social Needs   • Financial resource strain: Not on file   • Food insecurity - worry: Not on file   • Food insecurity - inability: Not on file   • Transportation needs - medical: Not on file   • Transportation needs - non-medical: Not on file   Occupational History   • Occupation:      Employer: RETIRED   Tobacco Use   • Smoking status: Former Smoker     Packs/day: 2.00     Years: 15.00     Pack years: 30.00     Last attempt to  quit:      Years since quittin.8   • Smokeless tobacco: Never Used   Substance and Sexual Activity   • Alcohol use: No   • Drug use: No   • Sexual activity: Not Currently     Partners: Female     Comment:    Other Topics Concern   • Not on file   Social History Narrative   • Not on file      Current Outpatient Medications on File Prior to Visit   Medication Sig Dispense Refill   • apixaban (ELIQUIS) 5 MG tablet tablet Take 1 tablet by mouth Every 12 (Twelve) Hours. As directed 180 tablet 3   • Cholecalciferol (VITAMIN D-3 PO) Take  by mouth Daily. As directed       • memantine (NAMENDA) 10 MG tablet Daily.     • Multiple Vitamins-Minerals (PRESERVISION AREDS 2 PO) Take  by mouth Daily. As directed       • pravastatin (PRAVACHOL) 80 MG tablet Take 1 tablet by mouth Daily. 90 tablet 2   • raNITIdine (ZANTAC) 150 MG tablet Take 1 tablet by mouth Every Night. 90 tablet 2   • rivastigmine (EXELON) 4.6 MG/24HR patch Place 1 patch on the skin as directed by provider Daily. As directed 30 patch 3   • TRINTELLIX 10 MG tablet Daily.       No current facility-administered medications on file prior to visit.       Allergies   Allergen Reactions   • Penicillins    • Sulfa Antibiotics         The following portions of the patient's history were reviewed and updated as appropriate: allergies, current medications, past family history, past medical history, past social history, past surgical history and problem list.    Review of Systems   Constitutional: Positive for fatigue.   HENT: Positive for sneezing.    Eyes: Negative.    Respiratory: Negative.    Cardiovascular: Negative.    Gastrointestinal: Negative.    Endocrine: Positive for cold intolerance.   Genitourinary: Negative.    Musculoskeletal: Positive for arthralgias (knee pain) and back pain.   Skin: Negative.    Allergic/Immunologic: Positive for environmental allergies.   Neurological: Negative.    Hematological: Bruises/bleeds easily.  "  Psychiatric/Behavioral: Negative.         Objective      Physical Exam  Pulse 59   Ht 167.6 cm (65.98\")   Wt 79.8 kg (175 lb 14.8 oz)   SpO2 91%   BMI 28.41 kg/m²     Body mass index is 28.41 kg/m².        GENERAL APPEARANCE: awake, alert & oriented x 3, in no acute distress and well developed, well nourished  PSYCH: normal mood and affect  LUNGS:  breathing nonlabored, no wheezing  EYES: sclera anicteric, pupils equal  CARDIOVASCULAR: palpable pulses dorsalis pedis, palpable posterior tibial bilaterally. Capillary refill less than 2 seconds  INTEGUMENTARY: skin intact, no clubbing, cyanosis  NEUROLOGIC:  Normal Sensation and reflexes             Ortho Exam  Peripheral Vascular:    Upper Extremity:   Inspection:  Left--no cyanotic nail beds Right--no cyanotic nail beds   Bilateral:  Pink nail beds with brisk capillary refill   Palpation:  Bilateral radial pulse normal    Musculoskeletal:  Global Assessment:  Overall assessment of Lower Extremity Muscle Strength and Tone:  Left quadriceps--5/5   Left hamstrings--5/5       Left tibialis anterior--5/5  Left gastroc-soleus--5/5  Left EHL--5/5    Right quadriceps--5/5  Right hamstrings--5/5  Right tibialis anterior--5/5  Right gastroc soleus--5/5  Right EHL--5/5    Lower Extremity:  Knee/Patella:  No digital clubbing or cyanosis.    Examination of left and right knees reveals:  Normal deep tendon reflexes, coordination, strength, tone, sensation.  No known fractures or deformities.    Inspection and Palpation:    Left knee:  Tenderness:  Over the medial joint line and moderate severity  Effusion:  none  Crepitus:  Positive  Pulses:  2+  Ecchymosis:  None  Warmth:  None     Right knee:  Tenderness:  Over the medial joint line and moderate severity  Effusion:  none  Crepitus:  Positive  Pulses:  2+  Ecchymosis:  None  Warmth:  None     ROM:  Right:  Extension:5    Flexion:120  Left:  Extension:5     Flexion:120    Instability:    Left:  Lachman Test:  Negative, Varus " stress test negative, Valgus stress test negative  Right:  Lachman Test:  Negative, Varus stress test negative, Valgus stress test negative    Deformities/Malalignments/Discrepancies:    Left:  Genu Varum   Right:  Genu Varum    Functional Testing:  Right:  Lilia's test:  Negative  Patella grind test:  Positive  Q-angle:  Normal    Left:  Lilia's test:  Negative  Patella grind test:  Positive  Q-angle:  normal    Imaging/Studies  Imaging Results (last 7 days)     Procedure Component Value Units Date/Time    XR Knee 4+ View Bilateral [[id number]] Resulted:  11/12/18 1321     Updated:  11/12/18 1321    Narrative:       Bilateral Knee X-Rays  Indication: Pain    Upright AP, Lateral, skiers and Sunrise views of bilateral knees     Findings: Arthritic changes in both knees right worse than left primarily   lateral compartment in the right knee.  No fracture  No bony lesion  Normal soft tissues    No prior studies were available for comparison.              Assessment/Plan        ICD-10-CM ICD-9-CM   1. Chronic pain of both knees M25.561 719.46    M25.562 338.29    G89.29    2. Primary osteoarthritis of both knees M17.0 715.16       Orders Placed This Encounter   Procedures   • Large Joint Arthrocentesis: R knee   • XR Knee 4+ View Bilateral    I injected his right knee with cortisone.  He has diabetes.  Therefore I did not do bilateral knee injections today.  He'll follow-up in 2 weeks for an injection in the left knee assuming the injection the right knee helps him.  He'll continue Tylenol as needed for pain.    Medical Decision Making  Management Options : over-the-counter medicine and prescription/IM medicine  Data/Risk: radiology tests and independent visualization of imaging, lab tests, or EMG/NCV    Neo Guido MD  11/12/18  1:37 PM         EMR Dragon/Transcription disclaimer:  Much of this encounter note is an electronic transcription of spoken language to printed text. Electronic transcription of  spoken language may permit erroneous, or at times, nonsensical words or phrases to be inadvertently transcribed. Although I have reviewed the note for such errors, some may still exist.

## 2018-11-12 NOTE — PROGRESS NOTES
Procedure   Large Joint Arthrocentesis: R knee  Date/Time: 11/12/2018 1:26 PM  Consent given by: patient  Site marked: site marked  Timeout: Immediately prior to procedure a time out was called to verify the correct patient, procedure, equipment, support staff and site/side marked as required   Supporting Documentation  Indications: pain   Procedure Details  Location: knee - R knee  Preparation: Patient was prepped and draped in the usual sterile fashion  Needle size: 22 G  Approach: anterolateral  Medications administered: 6 mg betamethasone acetate-betamethasone sodium phosphate 6 (3-3) MG/ML; 4 mL bupivacaine 0.25 %; 4 mL lidocaine PF 1% 1 %  Analysis: fluid sample sent for laboratory analysis

## 2018-11-13 ENCOUNTER — OFFICE VISIT (OUTPATIENT)
Dept: FAMILY MEDICINE CLINIC | Facility: CLINIC | Age: 78
End: 2018-11-13

## 2018-11-13 VITALS
HEIGHT: 65 IN | SYSTOLIC BLOOD PRESSURE: 132 MMHG | HEART RATE: 108 BPM | RESPIRATION RATE: 20 BRPM | WEIGHT: 178 LBS | TEMPERATURE: 98 F | BODY MASS INDEX: 29.66 KG/M2 | DIASTOLIC BLOOD PRESSURE: 82 MMHG | OXYGEN SATURATION: 98 %

## 2018-11-13 DIAGNOSIS — G30.1 LATE ONSET ALZHEIMER'S DISEASE WITHOUT BEHAVIORAL DISTURBANCE (HCC): ICD-10-CM

## 2018-11-13 DIAGNOSIS — Z23 IMMUNIZATION DUE: ICD-10-CM

## 2018-11-13 DIAGNOSIS — Z00.00 MEDICARE ANNUAL WELLNESS VISIT, SUBSEQUENT: Primary | ICD-10-CM

## 2018-11-13 DIAGNOSIS — F02.80 LATE ONSET ALZHEIMER'S DISEASE WITHOUT BEHAVIORAL DISTURBANCE (HCC): ICD-10-CM

## 2018-11-13 LAB
ALBUMIN SERPL-MCNC: 4.12 G/DL (ref 3.2–4.8)
ALBUMIN/GLOB SERPL: 1.9 G/DL (ref 1.5–2.5)
ALP SERPL-CCNC: 78 U/L (ref 25–100)
ALT SERPL W P-5'-P-CCNC: 16 U/L (ref 7–40)
ANION GAP SERPL CALCULATED.3IONS-SCNC: 6 MMOL/L (ref 3–11)
AST SERPL-CCNC: 20 U/L (ref 0–33)
BILIRUB SERPL-MCNC: 0.8 MG/DL (ref 0.3–1.2)
BUN BLD-MCNC: 18 MG/DL (ref 9–23)
BUN/CREAT SERPL: 15.9 (ref 7–25)
CALCIUM SPEC-SCNC: 9.8 MG/DL (ref 8.7–10.4)
CHLORIDE SERPL-SCNC: 107 MMOL/L (ref 99–109)
CO2 SERPL-SCNC: 28 MMOL/L (ref 20–31)
CREAT BLD-MCNC: 1.13 MG/DL (ref 0.6–1.3)
GFR SERPL CREATININE-BSD FRML MDRD: 62 ML/MIN/1.73
GLOBULIN UR ELPH-MCNC: 2.2 GM/DL
GLUCOSE BLD-MCNC: 116 MG/DL (ref 70–100)
POTASSIUM BLD-SCNC: 4.5 MMOL/L (ref 3.5–5.5)
PROT SERPL-MCNC: 6.3 G/DL (ref 5.7–8.2)
SODIUM BLD-SCNC: 141 MMOL/L (ref 132–146)

## 2018-11-13 PROCEDURE — 90662 IIV NO PRSV INCREASED AG IM: CPT | Performed by: FAMILY MEDICINE

## 2018-11-13 PROCEDURE — G0008 ADMIN INFLUENZA VIRUS VAC: HCPCS | Performed by: FAMILY MEDICINE

## 2018-11-13 PROCEDURE — 36415 COLL VENOUS BLD VENIPUNCTURE: CPT | Performed by: FAMILY MEDICINE

## 2018-11-13 PROCEDURE — G0439 PPPS, SUBSEQ VISIT: HCPCS | Performed by: FAMILY MEDICINE

## 2018-11-13 PROCEDURE — 80053 COMPREHEN METABOLIC PANEL: CPT | Performed by: FAMILY MEDICINE

## 2018-11-13 RX ORDER — MEMANTINE HYDROCHLORIDE 10 MG/1
10 TABLET ORAL DAILY
Qty: 90 TABLET | Refills: 3 | Status: SHIPPED | OUTPATIENT
Start: 2018-11-13 | End: 2019-01-01

## 2018-11-13 NOTE — PROGRESS NOTES
The ABCs of the Annual Wellness Visit    HEALTH RISK ASSESSMENT  SUBSEQUENT Medicare Wellness Visit   1940    Recent Hospitalizations: NONE    Current Medical Providers: Patient Care Team:  Samson Richard MD as PCP - Family Medicine  Galindo Flor MD as PCP - Claims Attributed  Uriah Lehman Jr., MD as Referring Physician (Urology)  Ramon Mcgowan MD as Consulting Physician (Radiation Oncology)  Stanley Thomas MD as Consulting Physician (Cardiology)    Smoking Status   Social History     Tobacco Use   Smoking Status Former Smoker   • Packs/day: 2.00   • Years: 15.00   • Pack years: 30.00   • Last attempt to quit:    • Years since quittin.8   Smokeless Tobacco Never Used     Alcohol Consumption   Social History     Substance and Sexual Activity   Alcohol Use No     Depression Screen   PHQ-2 Depression Screening  Little interest or pleasure in doing things? 0   Feeling down, depressed, or hopeless? 0   PHQ-2 Total Score 0        Health Habits and Functional and Cognitive Screening  Functional & Cognitive Status 2018   Do you have difficulty preparing food and eating? Yes   Do you have difficulty bathing yourself, getting dressed or grooming yourself? Yes   Do you have difficulty using the toilet? No   Do you have difficulty moving around from place to place? Yes   Do you have trouble with steps or getting out of a bed or a chair? Yes   In the past year have you fallen or experienced a near fall? Yes   Current Diet Well Balanced Diet   Dental Exam Up to date   Eye Exam Up to date   Exercise (times per week) 0 times per week   Current Exercise Activities Include None   Do you need help using the phone?  Yes   Are you deaf or do you have serious difficulty hearing?  Yes   Do you need help with transportation? Yes   Do you need help shopping? Yes   Do you need help preparing meals?  Yes   Do you need help with housework?  Yes   Do you need help with laundry? Yes   Do you need help  taking your medications? Yes   Do you need help managing money? Yes   Do you ever drive or ride in a car without wearing a seat belt? No   Have you felt unusual stress, anger or loneliness in the last month? No   Who do you live with? Spouse   If you need help, do you have trouble finding someone available to you? No   Have you been bothered in the last four weeks by sexual problems? No   Do you have difficulty concentrating, remembering or making decisions? Yes      Fall Risk Assessment  Fallen in past 6 months: 0--> No  Mental Status: 0--> no mental status change  Mobility: 0--> No mobility issues  Medications: 0--> No meds  Total Fall Risk Score: 2    Does the patient have evidence of cognitive impairment? No  Aspirin use counseling: Aspirin 81 mg po once daily advised.    Recent Lab Results:  Lab Results   Component Value Date    BUN 14 05/17/2018    CREATININE 1.00 05/17/2018    EGFRIFNONA 72 05/17/2018    BCR 14.0 05/17/2018     05/17/2018    K 4.2 05/17/2018    CO2 28.0 05/17/2018    CALCIUM 9.5 05/17/2018    ALBUMIN 4.00 05/17/2018    BILITOT 0.7 05/17/2018    ALKPHOS 87 05/17/2018    AST 29 05/17/2018    ALT 28 05/17/2018       Lab Results   Component Value Date    TRIG 101 05/17/2018    HDL 55 05/17/2018       Lab Results   Component Value Date    HGBA1C 5.20 05/17/2018       Subjective     History of Present Illness  Gibson Mansfield is a 78 y.o. male who presents for an Subsequent Wellness Visit.  He is accompanied by his wife today.  He continues care with his specialists.    Review of Systems   Unable to perform ROS: Dementia     The following portions of the patient's history were reviewed and updated as appropriate: past medical history, past surgical history, allergies, current medications, past family history and social history.     Outpatient Medications Prior to Visit   Medication Sig Dispense Refill   • apixaban (ELIQUIS) 5 MG tablet tablet Take 1 tablet by mouth Every 12 (Twelve) Hours. As  "directed 180 tablet 3   • Cholecalciferol (VITAMIN D-3 PO) Take  by mouth Daily. As directed       • Multiple Vitamins-Minerals (PRESERVISION AREDS 2 PO) Take  by mouth Daily. As directed       • pravastatin (PRAVACHOL) 80 MG tablet Take 1 tablet by mouth Daily. 90 tablet 2   • raNITIdine (ZANTAC) 150 MG tablet Take 1 tablet by mouth Every Night. 90 tablet 2   • rivastigmine (EXELON) 4.6 MG/24HR patch Place 1 patch on the skin as directed by provider Daily. As directed 30 patch 3   • memantine (NAMENDA) 10 MG tablet Daily.     • TRINTELLIX 10 MG tablet Daily.       No facility-administered medications prior to visit.        Objective     Visual Acuity    Visual Acuity Screening    Right eye Left eye Both eyes   Without correction:      With correction: 20/20 20/20 20/20     Vitals:    11/13/18 1013   BP: 132/82   Pulse: 108   Resp: 20   Temp: 98 °F (36.7 °C)   TempSrc: Temporal   SpO2: 98%   Weight: 80.7 kg (178 lb)   Height: 165.1 cm (65\")   PainSc: 0-No pain     Body mass index is 29.62 kg/m². Discussed the patient's BMI with him. The BMI is above average; BMI management plan is completed.    Physical Exam   Constitutional: He appears well-developed and well-nourished.   HENT:   Head: Normocephalic and atraumatic.   Right Ear: Hearing normal.   Left Ear: Hearing normal.   Mouth/Throat: Mucous membranes are normal.   Eyes: Conjunctivae and EOM are normal. Pupils are equal, round, and reactive to light.   Neck: Neck supple. No JVD present. No thyromegaly present.   Cardiovascular: Normal rate, regular rhythm and normal heart sounds.   Pulmonary/Chest: Effort normal and breath sounds normal.   Musculoskeletal: Normal range of motion.   Neurological: He is alert. No sensory deficit. He exhibits abnormal muscle tone. Gait abnormal.   Skin: Skin is warm and dry.   Psychiatric: He has a normal mood and affect. Cognition and memory are impaired.   Nursing note and vitals reviewed.    Compared to one year ago, the patient " feels his physical health is the same.  Compared to one year ago, the patient feels his mental health is worse.    Age-appropriate Screening Schedule  Refer to the list below for future screening recommendations based on patient's age, sex and/or medical conditions. Orders for these recommended tests are listed in the plan section. The patient has been provided with a written plan.    Health Maintenance   Topic Date Due   • LIPID PANEL  05/17/2019   • INFLUENZA VACCINE  Completed   • PNEUMOCOCCAL VACCINES (65+ LOW/MEDIUM RISK)  Completed     Advance Care Planning  We discussed advance care planning and importance of providing & updating documentation for the medical record.    Identification of Risk Factors  Risk factors include: cardiovascular risk, inactivity, increased fall risk, cognitive impairment and hearing limitations.      Assessment/Plan   Patient Self-Management and Personalized Health Advice  The patient has been provided with information about: diet, exercise, prevention of cardiac or vascular disease, fall prevention and mental health concerns and preventive services including:   · Advance directive, Counseling for cardiovascular disease risk reduction, Exercise counseling provided, Fall Risk assessment done, Glaucoma screening recommended, Influenza vaccine, Nutrition counseling provided.    Visit Diagnoses:    ICD-10-CM ICD-9-CM   1. Medicare annual wellness visit, subsequent Z00.00 V70.0   2. Late onset Alzheimer's disease without behavioral disturbance G30.1 331.0    F02.80 294.10   3. Immunization due Z23 V05.9       Orders Placed This Encounter   Procedures   • Fluzone High Dose =>65Years     Vaccine counseling and current VIS is provided for immunizations administered today.   • Comprehensive Metabolic Panel     Current Outpatient Medications:   •  apixaban 5 MG tablet tablet, Take 1 tablet by mouth Every 12 (Twelve) Hours. As directed, Disp: 180 tablet, Rfl: 3  •  Cholecalciferol (VITAMIN D-3  PO), Take  by mouth Daily. As directed  , Disp: , Rfl:   •  memantine (NAMENDA) 10 MG tablet, Take 1 tablet by mouth Daily., Disp: 90 tablet, Rfl: 3  •  Multiple Vitamins-Minerals (PRESERVISION AREDS 2 PO), Take  by mouth Daily. As directed  , Disp: , Rfl:   •  pravastatin (PRAVACHOL) 80 MG tablet, Take 1 tablet by mouth Daily., Disp: 90 tablet, Rfl: 2  •  raNITIdine (ZANTAC) 150 MG tablet, Take 1 tablet by mouth Every Night., Disp: 90 tablet, Rfl: 2  •  rivastigmine 4.6 MG/24HR patch, Place 1 patch on the skin as directed by provider Daily. Disp: 30 patch, Rfl: 3    No current facility-administered medications for this visit.     Current outpatient and discharge medications have been reconciled for the patient.  Reviewed by: Samson Richard MD      Reviewed use of high risk medication in the elderly: Not applicable.  Reviewed for potential of harmful drug interactions in the elderly: Not applicable.    Follow Up:  Return in about 1 year (around 11/13/2019), or if symptoms worsen or fail to improve, for Medicare Wellness.     An After Visit Summary and PPPS with all of these plans were given to the patient.         Samson Richard MD 11/13/18 11:40 AM

## 2018-11-26 ENCOUNTER — OFFICE VISIT (OUTPATIENT)
Dept: ORTHOPEDIC SURGERY | Facility: CLINIC | Age: 78
End: 2018-11-26

## 2018-11-26 VITALS — HEART RATE: 79 BPM | HEIGHT: 65 IN | BODY MASS INDEX: 29.64 KG/M2 | WEIGHT: 177.91 LBS | OXYGEN SATURATION: 97 %

## 2018-11-26 DIAGNOSIS — M17.12 PRIMARY OSTEOARTHRITIS OF LEFT KNEE: Primary | ICD-10-CM

## 2018-11-26 PROCEDURE — 20610 DRAIN/INJ JOINT/BURSA W/O US: CPT | Performed by: ORTHOPAEDIC SURGERY

## 2018-11-26 RX ORDER — BUPIVACAINE HYDROCHLORIDE 2.5 MG/ML
4 INJECTION, SOLUTION INFILTRATION; PERINEURAL
Status: COMPLETED | OUTPATIENT
Start: 2018-11-26 | End: 2018-11-26

## 2018-11-26 RX ORDER — LIDOCAINE HYDROCHLORIDE 10 MG/ML
4 INJECTION, SOLUTION INFILTRATION; PERINEURAL
Status: COMPLETED | OUTPATIENT
Start: 2018-11-26 | End: 2018-11-26

## 2018-11-26 RX ORDER — BETAMETHASONE SODIUM PHOSPHATE AND BETAMETHASONE ACETATE 3; 3 MG/ML; MG/ML
6 INJECTION, SUSPENSION INTRA-ARTICULAR; INTRALESIONAL; INTRAMUSCULAR; SOFT TISSUE
Status: COMPLETED | OUTPATIENT
Start: 2018-11-26 | End: 2018-11-26

## 2018-11-26 RX ADMIN — LIDOCAINE HYDROCHLORIDE 4 ML: 10 INJECTION, SOLUTION INFILTRATION; PERINEURAL at 14:16

## 2018-11-26 RX ADMIN — BETAMETHASONE SODIUM PHOSPHATE AND BETAMETHASONE ACETATE 6 MG: 3; 3 INJECTION, SUSPENSION INTRA-ARTICULAR; INTRALESIONAL; INTRAMUSCULAR; SOFT TISSUE at 14:16

## 2018-11-26 RX ADMIN — BUPIVACAINE HYDROCHLORIDE 4 ML: 2.5 INJECTION, SOLUTION INFILTRATION; PERINEURAL at 14:16

## 2018-11-26 NOTE — PROGRESS NOTES
Procedure   Large Joint Arthrocentesis: L knee  Date/Time: 11/26/2018 2:16 PM  Consent given by: patient  Site marked: site marked  Timeout: Immediately prior to procedure a time out was called to verify the correct patient, procedure, equipment, support staff and site/side marked as required   Supporting Documentation  Indications: pain   Procedure Details  Location: knee - L knee  Preparation: Patient was prepped and draped in the usual sterile fashion  Needle size: 22 G  Approach: anterolateral  Medications administered: 4 mL bupivacaine 0.25 %; 4 mL lidocaine 1 %; 6 mg betamethasone acetate-betamethasone sodium phosphate 6 (3-3) MG/ML  Patient tolerance: patient tolerated the procedure well with no immediate complications         I injected his left knee with cortisone.  He tolerated injection well.  If it doesn't help, we will pursue Orthovisc

## 2018-11-26 NOTE — PROGRESS NOTES
[facility] Note    Subjective     Chief Complaint   Patient presents with   • Follow-up     2 weeks bilateral knees        HPI      Gibson Mansfield is a 78 y.o. male.  He is follow-up bilateral knee arthritis.  His right knee injection gave him minimal relief.  He is here for left knee injection.    Past Medical History:   Diagnosis Date   • Atrial fibrillation (CMS/HCC)    • Blindness    • Chronic anticoagulation    • COPD (chronic obstructive pulmonary disease) (CMS/HCC)    • Dementia    • GERD (gastroesophageal reflux disease)    • History of prostate cancer    • History of stroke    • HLD (hyperlipidemia)    • [place] (hard of hearing)    • HTN (hypertension)    • Legally blind    • Macular degeneration of both eyes    • Osteoarthritis of lumbar spine       Past Surgical History:   Procedure Laterality Date   • CATARACT EXTRACTION Bilateral    • COLONOSCOPY     • LUMBAR DISC SURGERY      Levels unknown, Dr. Decker   • LUMBAR DISCECTOMY      Levels unknown, Dr. Decker   • LUMBAR FUSION      Levels unknown, Dr. Decker   • PROSTATE FIDUCIAL MARKER PLACEMENT        Family History   Problem Relation Age of Onset   • Diabetes Mother    • Breast cancer Mother    • Heart disease Father      Social History     Socioeconomic History   • Marital status:      Spouse name: Lynette   • Number of children: 2   • Years of education: H.S.   • Highest education level: Not on file   Social Needs   • Financial resource strain: Not on file   • Food insecurity - worry: Not on file   • Food insecurity - inability: Not on file   • Transportation needs - medical: Not on file   • Transportation needs - non-medical: Not on file   Occupational History   • Occupation:      Employer: RETIRED   Tobacco Use   • Smoking status: Former Smoker     Packs/day: 2.00     Years: 15.00     Pack years: 30.00     Last attempt to quit:      Years since quittin.9   • Smokeless  "tobacco: Never Used   Substance and Sexual Activity   • Alcohol use: No   • Drug use: No   • Sexual activity: Not Currently     Partners: Female   Other Topics Concern   • Not on file   Social History Narrative   • Not on file      Current Outpatient Medications on File Prior to Visit   Medication Sig Dispense Refill   • apixaban (ELIQUIS) 5 MG tablet tablet Take 1 tablet by mouth Every 12 (Twelve) Hours. As directed 180 tablet 3   • Cholecalciferol (VITAMIN D-3 PO) Take  by mouth Daily. As directed       • memantine (NAMENDA) 10 MG tablet Take 1 tablet by mouth Daily. 90 tablet 3   • Multiple Vitamins-Minerals (PRESERVISION AREDS 2 PO) Take  by mouth Daily. As directed       • pravastatin (PRAVACHOL) 80 MG tablet Take 1 tablet by mouth Daily. 90 tablet 2   • raNITIdine (ZANTAC) 150 MG tablet Take 1 tablet by mouth Every Night. 90 tablet 2   • rivastigmine (EXELON) 4.6 MG/24HR patch Place 1 patch on the skin as directed by provider Daily. As directed 30 patch 3     No current facility-administered medications on file prior to visit.       Allergies   Allergen Reactions   • Penicillins    • Sulfa Antibiotics         The following portions of the patient's history were reviewed and updated as appropriate: allergies, current medications, past family history, past medical history, past social history, past surgical history and problem list.    Review of Systems   Constitutional: Negative.    HENT: Positive for hearing loss.    Eyes: Negative.    Respiratory: Negative.    Cardiovascular: Negative.    Gastrointestinal: Negative.    Endocrine: Negative.    Genitourinary: Negative.    Musculoskeletal: Positive for arthralgias.   Skin: Negative.    Allergic/Immunologic: Negative.    Neurological: Negative.    Hematological: Negative.    Psychiatric/Behavioral: Negative.         Objective      Physical Exam  Pulse 79   Ht 165.1 cm (65\")   Wt 80.7 kg (177 lb 14.6 oz)   SpO2 97%   BMI 29.61 kg/m²     Body mass index is " 29.61 kg/m².        GENERAL APPEARANCE: awake, alert & oriented x 3, in no acute distress and well developed, well nourished  PSYCH: normal mood and affect      Ortho Exam  Peripheral Vascular:    Upper Extremity:   Inspection:  Left--no cyanotic nail beds Right--no cyanotic nail beds   Bilateral:  Pink nail beds with brisk capillary refill   Palpation:  Bilateral radial pulse normal    Musculoskeletal:  Global Assessment:  Overall assessment of Lower Extremity Muscle Strength and Tone:  Left quadriceps--5/5   Left hamstrings--5/5       Left tibialis anterior--5/5  Left gastroc-soleus--5/5  Left EHL--5/5    Right quadriceps--5/5  Right hamstrings--5/5  Right tibialis anterior--5/5  Right gastroc soleus--5/5  Right EHL--5/5    Lower Extremity:  Knee/Patella:  No digital clubbing or cyanosis.    Examination of left and right knees reveals:  Normal deep tendon reflexes, coordination, strength, tone, sensation.  No known fractures or deformities.    Inspection and Palpation:    Left knee:  Tenderness:  Over the medial joint line and moderate severity  Effusion:  none  Crepitus:  Positive  Pulses:  2+  Ecchymosis:  None  Warmth:  None     Right knee:  Tenderness:  Over the medial joint line and moderate severity  Effusion:  none  Crepitus:  Positive  Pulses:  2+  Ecchymosis:  None  Warmth:  None     ROM:  Right:  Extension:5    Flexion:120  Left:  Extension:5     Flexion:120    Instability:    Left:  Lachman Test:  Negative, Varus stress test negative, Valgus stress test negative  Right:  Lachman Test:  Negative, Varus stress test negative, Valgus stress test negative    Deformities/Malalignments/Discrepancies:    Left:  Genu Varum   Right:  Genu Varum    Functional Testing:  Right:  Lilia's test:  Negative  Patella grind test:  Positive  Q-angle:  Normal    Left:  Lilia's test:  Negative  Patella grind test:  Positive  Q-angle:  normal    Imaging/Studies  Imaging Results (last 7 days)     ** No results found for  the last 168 hours. **          Assessment/Plan        ICD-10-CM ICD-9-CM   1. Primary osteoarthritis of left knee M17.12 715.16       Orders Placed This Encounter   Procedures   • Large Joint Arthrocentesis: L knee    I injected left knee.  He tolerated it well.  He'll follow-up as needed.  If the shot does not help we will do Orthovisc.    Medical Decision Making  Management Options : prescription/IM medicine      Neo Guido MD  11/26/18  2:25 PM         EMR Dragon/Transcription disclaimer:  Much of this encounter note is an electronic transcription of spoken language to printed text. Electronic transcription of spoken language may permit erroneous, or at times, nonsensical words or phrases to be inadvertently transcribed. Although I have reviewed the note for such errors, some may still exist.

## 2018-12-17 ENCOUNTER — HOSPITAL ENCOUNTER (EMERGENCY)
Facility: HOSPITAL | Age: 78
Discharge: LEFT BEFORE DISCHARGE | End: 2018-12-17

## 2018-12-17 VITALS
WEIGHT: 174 LBS | HEART RATE: 86 BPM | TEMPERATURE: 97.7 F | DIASTOLIC BLOOD PRESSURE: 84 MMHG | OXYGEN SATURATION: 98 % | HEIGHT: 67 IN | BODY MASS INDEX: 27.31 KG/M2 | RESPIRATION RATE: 16 BRPM | SYSTOLIC BLOOD PRESSURE: 168 MMHG

## 2018-12-18 ENCOUNTER — OFFICE VISIT (OUTPATIENT)
Dept: CARDIOLOGY | Facility: CLINIC | Age: 78
End: 2018-12-18

## 2018-12-18 VITALS
HEART RATE: 83 BPM | WEIGHT: 178 LBS | BODY MASS INDEX: 27.94 KG/M2 | SYSTOLIC BLOOD PRESSURE: 138 MMHG | DIASTOLIC BLOOD PRESSURE: 124 MMHG | HEIGHT: 67 IN

## 2018-12-18 DIAGNOSIS — I10 ESSENTIAL HYPERTENSION: ICD-10-CM

## 2018-12-18 DIAGNOSIS — R09.89 ABSENT PEDAL PULSES: ICD-10-CM

## 2018-12-18 DIAGNOSIS — I48.0 PAROXYSMAL ATRIAL FIBRILLATION (HCC): Primary | ICD-10-CM

## 2018-12-18 DIAGNOSIS — R29.898 WEAKNESS OF BOTH LOWER EXTREMITIES: ICD-10-CM

## 2018-12-18 PROCEDURE — 99214 OFFICE O/P EST MOD 30 MIN: CPT | Performed by: INTERNAL MEDICINE

## 2018-12-18 NOTE — PROGRESS NOTES
OFFICE FOLLOW UP     Date of Encounter:2018     Name: Gibson Mansfield  : 1940  Address: [address] [place] [address] [address] [address]  Home Phone: [phone]    PCP: Samson Richard MD  [address]  [facility] [address]    Gibson Mansfield is a 78 y.o. male.    Chief Complaint: Follow up of PAF, HTN, HLD    Problem List:   1. Paroxysmal atrial fibrillation:  a. Symptoms of fatigue over the last month with EKG on 2015 revealing atrial fibrillation with controlled rate.  b. CHADS-VASc = 4.   c. Apixaban therapy instituted.  d. Echo 2015: EF 55-60%, moderate aortic calcification, mild AI  e. 24-hour Holter, A-fib rate  bpm, rate>120 (intermittent) 17 of 24 hours (2017)  f. All symptoms abated after discontinuation of beta blocker.  2. Hypertension.   3. Hyperlipidemia.   4. Diabetes mellitus type 2, diet controlled.   5. Osteoarthritis.   6. Prostate cancer:  a. “Watch and wait” by primary care.   7. History of PUD.   8. Dementia.  9. History of remote tobacco abuse  10. Lower extremity weakness and falls  2018  a. Negative NS evaluation    Allergies:  Allergies   Allergen Reactions   • Penicillins    • Sulfa Antibiotics      Current Medications:  •  apixaban 5 MG by mouth 2 (Two) Times a Day  •  Cholecalciferol  by mouth Daily. As directed    •  memantine 10 MG by mouth Daily.  •  Multiple Vitamins-Minerals by mouth Daily. As directed  •  pravastatin 80 MG by mouth Daily.  •  raNITIdine 150 MG by mouth Every Night  •  rivastigmine 4.6 MG/24HR patch, Place 1 patch on the skin as directed by provider Daily. As directed,     History of Present Illness:           Mr. Mansfield returns today for yearly visit. Since his last visit he has developed lower extremity weakness and has fallen a number of times. This started in February and has gotten progressively worse. He was evaluated by Dr. Costello and there was no evidence of lumbar spine disease which would explain his gait. He is  "participating in physical therapy and is now using a walker. He reports his right leg will just \"give out.\" He denies claudication.     The following portions of the patient's history were reviewed and updated as appropriate: allergies, current medications and problem list.    ROS: Pertinent positives as listed in the HPI.  All other systems reviewed and negative.    Objective:  Vitals:    12/18/18 1345 12/18/18 1347   BP: (!) 154/113 163/81   BP Location: Right arm Right arm   Patient Position: Sitting Sitting   Pulse: 83 74   Weight: 80.7 kg (178 lb) 80.7 kg (178 lb)   Height: 170.2 cm (67\") 170.2 cm (67\")     Physical Exam:  GENERAL: Alert, cooperative, in no acute distress.   HEENT: Normocephalic, no adenopathy, no jugular venous distention  HEART: No discrete PMI is noted. Regular rhythm, normal rate, and no murmurs, gallops, or rubs.   LUNGS: Clear to auscultation bilaterally. No wheezing, rales or ronchi.  ABDOMEN: Soft, bowel sounds present, non-tender   NEUROLOGIC: No focal abnormalities involving strength or sensation are noted.   EXTREMITIES: No clubbing, cyanosis, or edema noted. Right PT palpable, no pulses palpable on left foot; no evidence of threatened tissue loss     Diagnostic Data:    Lab Results   Component Value Date    CHOL 159 05/17/2018    TRIG 101 05/17/2018    HDL 55 05/17/2018    LDL 95 05/17/2018      Lab Results   Component Value Date    GLUCOSE 116 (H) 11/13/2018    BUN 18 11/13/2018    CREATININE 1.13 11/13/2018    EGFRIFNONA 62 11/13/2018    BCR 15.9 11/13/2018    K 4.5 11/13/2018    CO2 28.0 11/13/2018    CALCIUM 9.8 11/13/2018    ALBUMIN 4.12 11/13/2018    AST 20 11/13/2018    ALT 16 11/13/2018      Lab Results   Component Value Date    TSH 1.299 05/17/2018      Lab Results   Component Value Date    HGBA1C 5.20 05/17/2018      Lab Results   Component Value Date    WBC 5.25 05/17/2018    HGB 12.6 (L) 05/17/2018    HCT 40.3 05/17/2018    MCV 93.9 05/17/2018     (L) 05/17/2018 "      Procedures    Assessment and Plan:   1.  PAF: Continue Eliquis for stroke prevention, with fall precautions in place.   2.  HTN: elevated in office today, continue home monitoring. We will not make any changes today.   3.  HLD:  LDL 95, continue Pravastatin 80 mg.   4.  Lower extremity weakness: We will refer him to Dr. Chowdhury for ROXANN's to exclude vascular disease as a possible cause. He had a negative NS evaluation.  He also needs to see a neurologist for a 10 month history of progressive LE weakness with falls.     I will see Gibson ANA [place] back in one year or sooner on an as needed basis.   I, Stanley Thomas MD, [facility], [facility], personally performed the services described in this documentation as scribed by the above named individual in my presence, and it is both accurate and complete. At 2:04 PM on 12/18/2018      Scribed for Stanley Thomas MD by Katelyn Navas RN. 12/18/2018 1:46 PM.        EMR Dragon/Transcription Disclaimer:  Much of this encounter note is an electronic transcription/translation of spoken language to printed text.  The electronic translation of spoken language may permit erroneous, or at times, nonsensical words or phrases to be inadvertently transcribed.  Although I have reviewed the note for such errors, some may still exist.

## 2018-12-20 ENCOUNTER — OFFICE VISIT (OUTPATIENT)
Dept: FAMILY MEDICINE CLINIC | Facility: CLINIC | Age: 78
End: 2018-12-20

## 2018-12-20 VITALS
WEIGHT: 177.4 LBS | RESPIRATION RATE: 16 BRPM | HEIGHT: 67 IN | DIASTOLIC BLOOD PRESSURE: 80 MMHG | OXYGEN SATURATION: 97 % | SYSTOLIC BLOOD PRESSURE: 134 MMHG | HEART RATE: 74 BPM | BODY MASS INDEX: 27.84 KG/M2

## 2018-12-20 DIAGNOSIS — K40.90 RIGHT INGUINAL HERNIA: Primary | ICD-10-CM

## 2018-12-20 PROCEDURE — 99213 OFFICE O/P EST LOW 20 MIN: CPT | Performed by: FAMILY MEDICINE

## 2018-12-20 NOTE — PROGRESS NOTES
Subjective   Gibson Mansfield is a 78 y.o. male.     History of Present Illness   He is accompanied by his wife.  He describes abdominal straining and appreciated a right groin defect.  He went to a local ED for evaluation and was told he had an inguinal hernia.  He denies pain, dysuria, difficulty urinating, gross hematuria, fever, chills or loss of gait.  He describes some occasional constipation but no blood in stool or with wiping.  He has a good appetite and has noted no significant weight loss.  He is able to reduce the defect.    Review of Systems   Constitutional: Negative for chills, fever and unexpected weight change.   HENT: Negative for trouble swallowing.    Respiratory: Negative for shortness of breath.    Cardiovascular: Negative for chest pain.   Gastrointestinal: Positive for constipation. Negative for abdominal pain, blood in stool, nausea and vomiting.   Genitourinary: Negative for difficulty urinating and hematuria.   Skin: Negative for rash.       Objective   Physical Exam   Constitutional: He is oriented to person, place, and time. He appears well-developed and well-nourished.   HENT:   Head: Normocephalic and atraumatic.   Right Ear: Hearing normal.   Left Ear: Hearing normal.   Mouth/Throat: Mucous membranes are normal.   Eyes: Conjunctivae and EOM are normal. Pupils are equal, round, and reactive to light.   Neck: Neck supple. No JVD present. No thyromegaly present.   Cardiovascular: Normal rate, regular rhythm and normal heart sounds.   Pulmonary/Chest: Effort normal and breath sounds normal.   Abdominal: A hernia is present. Hernia confirmed positive in the right inguinal area.   Musculoskeletal: Normal range of motion.   Neurological: He is alert and oriented to person, place, and time. He has normal strength. No sensory deficit. Gait (uses a cane) abnormal.   Skin: Skin is warm and dry.   Psychiatric: He has a normal mood and affect. His behavior is normal. Judgment and thought content  normal. Cognition and memory are normal.   Nursing note and vitals reviewed.      Assessment/Plan   Diagnoses and all orders for this visit:    Right inguinal hernia  -     Ambulatory Referral to General Surgery  - Lifting precautions  - Go to the ED with any pain or changes.

## 2018-12-21 ENCOUNTER — TRANSCRIBE ORDERS (OUTPATIENT)
Dept: ADMINISTRATIVE | Facility: HOSPITAL | Age: 78
End: 2018-12-21

## 2018-12-21 DIAGNOSIS — K40.90 UNILATERAL INGUINAL HERNIA WITHOUT OBSTRUCTION OR GANGRENE, RECURRENCE NOT SPECIFIED: Primary | ICD-10-CM

## 2018-12-27 ENCOUNTER — HOSPITAL ENCOUNTER (OUTPATIENT)
Dept: CT IMAGING | Facility: HOSPITAL | Age: 78
Discharge: HOME | End: 2018-12-27
Attending: SURGERY | Admitting: SURGERY

## 2018-12-27 DIAGNOSIS — K40.90 UNILATERAL INGUINAL HERNIA WITHOUT OBSTRUCTION OR GANGRENE, RECURRENCE NOT SPECIFIED: ICD-10-CM

## 2018-12-27 PROCEDURE — 82565 ASSAY OF CREATININE: CPT

## 2018-12-27 PROCEDURE — A9270 NON-COVERED ITEM OR SERVICE: HCPCS | Performed by: SURGERY

## 2018-12-27 PROCEDURE — 25010000002 IOPAMIDOL 61 % SOLUTION: Performed by: SURGERY

## 2018-12-27 PROCEDURE — 63710000001 BARIUM 2 % SUSPENSION: Performed by: SURGERY

## 2018-12-27 PROCEDURE — 74177 CT ABD & PELVIS W/CONTRAST: CPT

## 2018-12-27 RX ADMIN — IOPAMIDOL 85 ML: 612 INJECTION, SOLUTION INTRAVENOUS at 15:18

## 2018-12-27 RX ADMIN — BARIUM SULFATE 450 ML: 21 SUSPENSION ORAL at 14:00

## 2018-12-28 LAB — CREAT BLDA-MCNC: 1.3 MG/DL (ref 0.6–1.3)

## 2019-01-01 ENCOUNTER — READMISSION MANAGEMENT (OUTPATIENT)
Dept: CALL CENTER | Facility: HOSPITAL | Age: 79
End: 2019-01-01

## 2019-01-01 ENCOUNTER — OUTSIDE FACILITY SERVICE (OUTPATIENT)
Dept: HOSPITALIST | Facility: HOSPITAL | Age: 79
End: 2019-01-01

## 2019-01-01 ENCOUNTER — TELEPHONE (OUTPATIENT)
Dept: NEUROLOGY | Facility: CLINIC | Age: 79
End: 2019-01-01

## 2019-01-01 ENCOUNTER — APPOINTMENT (OUTPATIENT)
Dept: GENERAL RADIOLOGY | Facility: HOSPITAL | Age: 79
End: 2019-01-01

## 2019-01-01 ENCOUNTER — APPOINTMENT (OUTPATIENT)
Dept: LAB | Facility: HOSPITAL | Age: 79
End: 2019-01-01

## 2019-01-01 ENCOUNTER — HOSPITAL ENCOUNTER (OUTPATIENT)
Dept: CARDIOLOGY | Facility: HOSPITAL | Age: 79
Discharge: HOME | End: 2019-06-20
Admitting: PHYSICIAN ASSISTANT

## 2019-01-01 ENCOUNTER — OFFICE VISIT (OUTPATIENT)
Dept: NEUROLOGY | Facility: CLINIC | Age: 79
End: 2019-01-01

## 2019-01-01 ENCOUNTER — TELEPHONE (OUTPATIENT)
Dept: CARDIOLOGY | Facility: HOSPITAL | Age: 79
End: 2019-01-01

## 2019-01-01 ENCOUNTER — HOSPITAL ENCOUNTER (OUTPATIENT)
Dept: MRI IMAGING | Facility: HOSPITAL | Age: 79
Discharge: HOME | End: 2019-03-20

## 2019-01-01 ENCOUNTER — OFFICE VISIT (OUTPATIENT)
Dept: FAMILY MEDICINE CLINIC | Facility: CLINIC | Age: 79
End: 2019-01-01

## 2019-01-01 ENCOUNTER — OFFICE VISIT (OUTPATIENT)
Dept: CARDIOLOGY | Facility: HOSPITAL | Age: 79
End: 2019-01-01

## 2019-01-01 ENCOUNTER — APPOINTMENT (OUTPATIENT)
Dept: CT IMAGING | Facility: HOSPITAL | Age: 79
End: 2019-01-01

## 2019-01-01 ENCOUNTER — HOSPITAL ENCOUNTER (OUTPATIENT)
Facility: HOSPITAL | Age: 79
Setting detail: OBSERVATION
LOS: 1 days | Discharge: HOME HEALTH | End: 2019-05-16
Attending: FAMILY MEDICINE | Admitting: INTERNAL MEDICINE

## 2019-01-01 ENCOUNTER — HOSPITAL ENCOUNTER (INPATIENT)
Facility: HOSPITAL | Age: 79
LOS: 7 days | Discharge: NURSING, REHAB OR LONG-TERM CARE | End: 2019-06-27
Attending: EMERGENCY MEDICINE | Admitting: INTERNAL MEDICINE

## 2019-01-01 ENCOUNTER — TRANSITIONAL CARE MANAGEMENT TELEPHONE ENCOUNTER (OUTPATIENT)
Dept: FAMILY MEDICINE CLINIC | Facility: CLINIC | Age: 79
End: 2019-01-01

## 2019-01-01 ENCOUNTER — APPOINTMENT (OUTPATIENT)
Dept: CARDIOLOGY | Facility: HOSPITAL | Age: 79
End: 2019-01-01

## 2019-01-01 ENCOUNTER — HOSPITAL ENCOUNTER (OUTPATIENT)
Dept: GENERAL RADIOLOGY | Facility: HOSPITAL | Age: 79
End: 2019-01-01

## 2019-01-01 ENCOUNTER — TELEPHONE (OUTPATIENT)
Dept: FAMILY MEDICINE CLINIC | Facility: CLINIC | Age: 79
End: 2019-01-01

## 2019-01-01 ENCOUNTER — HOSPITAL ENCOUNTER (OUTPATIENT)
Dept: GENERAL RADIOLOGY | Facility: HOSPITAL | Age: 79
Discharge: HOME | End: 2019-06-19
Admitting: NURSE PRACTITIONER

## 2019-01-01 ENCOUNTER — OFFICE VISIT (OUTPATIENT)
Dept: CARDIOLOGY | Facility: CLINIC | Age: 79
End: 2019-01-01

## 2019-01-01 ENCOUNTER — APPOINTMENT (OUTPATIENT)
Dept: MRI IMAGING | Facility: HOSPITAL | Age: 79
End: 2019-01-01

## 2019-01-01 ENCOUNTER — TELEPHONE (OUTPATIENT)
Dept: CARDIOLOGY | Facility: CLINIC | Age: 79
End: 2019-01-01

## 2019-01-01 ENCOUNTER — HOSPITAL ENCOUNTER (OUTPATIENT)
Dept: NEUROLOGY | Facility: HOSPITAL | Age: 79
Discharge: HOME | End: 2019-03-20
Admitting: PSYCHIATRY & NEUROLOGY

## 2019-01-01 VITALS
OXYGEN SATURATION: 95 % | BODY MASS INDEX: 27 KG/M2 | RESPIRATION RATE: 16 BRPM | WEIGHT: 172 LBS | TEMPERATURE: 97.5 F | DIASTOLIC BLOOD PRESSURE: 83 MMHG | HEART RATE: 86 BPM | SYSTOLIC BLOOD PRESSURE: 152 MMHG | HEIGHT: 67 IN

## 2019-01-01 VITALS
HEIGHT: 66 IN | DIASTOLIC BLOOD PRESSURE: 57 MMHG | HEART RATE: 69 BPM | WEIGHT: 163.9 LBS | SYSTOLIC BLOOD PRESSURE: 133 MMHG | BODY MASS INDEX: 26.34 KG/M2 | RESPIRATION RATE: 14 BRPM | TEMPERATURE: 97.5 F | OXYGEN SATURATION: 95 %

## 2019-01-01 VITALS
WEIGHT: 174.6 LBS | DIASTOLIC BLOOD PRESSURE: 86 MMHG | HEART RATE: 75 BPM | HEIGHT: 67 IN | BODY MASS INDEX: 27.4 KG/M2 | SYSTOLIC BLOOD PRESSURE: 120 MMHG

## 2019-01-01 VITALS
BODY MASS INDEX: 25.39 KG/M2 | OXYGEN SATURATION: 98 % | HEART RATE: 50 BPM | SYSTOLIC BLOOD PRESSURE: 130 MMHG | DIASTOLIC BLOOD PRESSURE: 68 MMHG | HEIGHT: 66 IN | WEIGHT: 158 LBS

## 2019-01-01 VITALS
WEIGHT: 177 LBS | BODY MASS INDEX: 27.78 KG/M2 | DIASTOLIC BLOOD PRESSURE: 90 MMHG | HEIGHT: 67 IN | SYSTOLIC BLOOD PRESSURE: 142 MMHG

## 2019-01-01 VITALS
SYSTOLIC BLOOD PRESSURE: 148 MMHG | BODY MASS INDEX: 27.78 KG/M2 | HEIGHT: 67 IN | DIASTOLIC BLOOD PRESSURE: 86 MMHG | WEIGHT: 177 LBS

## 2019-01-01 VITALS
DIASTOLIC BLOOD PRESSURE: 90 MMHG | HEART RATE: 86 BPM | SYSTOLIC BLOOD PRESSURE: 124 MMHG | RESPIRATION RATE: 20 BRPM | TEMPERATURE: 98.3 F | BODY MASS INDEX: 27.31 KG/M2 | HEIGHT: 67 IN | WEIGHT: 174 LBS | OXYGEN SATURATION: 95 %

## 2019-01-01 VITALS
HEART RATE: 70 BPM | WEIGHT: 176.5 LBS | RESPIRATION RATE: 18 BRPM | BODY MASS INDEX: 27.7 KG/M2 | DIASTOLIC BLOOD PRESSURE: 107 MMHG | SYSTOLIC BLOOD PRESSURE: 161 MMHG | OXYGEN SATURATION: 96 % | TEMPERATURE: 97.2 F | HEIGHT: 67 IN

## 2019-01-01 VITALS
OXYGEN SATURATION: 99 % | TEMPERATURE: 97.1 F | HEIGHT: 67 IN | WEIGHT: 172.6 LBS | RESPIRATION RATE: 17 BRPM | BODY MASS INDEX: 27.09 KG/M2 | HEART RATE: 66 BPM | DIASTOLIC BLOOD PRESSURE: 88 MMHG | SYSTOLIC BLOOD PRESSURE: 150 MMHG

## 2019-01-01 VITALS
DIASTOLIC BLOOD PRESSURE: 82 MMHG | SYSTOLIC BLOOD PRESSURE: 142 MMHG | HEIGHT: 67 IN | BODY MASS INDEX: 27.35 KG/M2 | OXYGEN SATURATION: 95 % | HEART RATE: 84 BPM

## 2019-01-01 DIAGNOSIS — I48.21 PERMANENT ATRIAL FIBRILLATION (HCC): ICD-10-CM

## 2019-01-01 DIAGNOSIS — I50.21 ACUTE SYSTOLIC HEART FAILURE (HCC): Primary | ICD-10-CM

## 2019-01-01 DIAGNOSIS — I67.89 CEREBRAL MICROVASCULAR DISEASE: Primary | ICD-10-CM

## 2019-01-01 DIAGNOSIS — Z74.09 IMPAIRED FUNCTIONAL MOBILITY, BALANCE, GAIT, AND ENDURANCE: ICD-10-CM

## 2019-01-01 DIAGNOSIS — R27.0 ATAXIA: ICD-10-CM

## 2019-01-01 DIAGNOSIS — G47.10 HYPERSOMNOLENCE: ICD-10-CM

## 2019-01-01 DIAGNOSIS — R06.02 SHORTNESS OF BREATH: ICD-10-CM

## 2019-01-01 DIAGNOSIS — I50.22 SYSTOLIC CHF, CHRONIC (HCC): Primary | ICD-10-CM

## 2019-01-01 DIAGNOSIS — R79.89 ELEVATED SERUM CREATININE: ICD-10-CM

## 2019-01-01 DIAGNOSIS — I63.511 ACUTE ISCHEMIC RIGHT MCA STROKE (HCC): ICD-10-CM

## 2019-01-01 DIAGNOSIS — M50.30 DDD (DEGENERATIVE DISC DISEASE), CERVICAL: ICD-10-CM

## 2019-01-01 DIAGNOSIS — I10 ESSENTIAL HYPERTENSION: ICD-10-CM
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
## 2019-01-01 DIAGNOSIS — R41.841 COGNITIVE COMMUNICATION DEFICIT: ICD-10-CM

## 2019-01-01 DIAGNOSIS — R77.8 ELEVATED TROPONIN: Primary | ICD-10-CM

## 2019-01-01 DIAGNOSIS — Z74.09 IMPAIRED MOBILITY AND ADLS: ICD-10-CM

## 2019-01-01 DIAGNOSIS — G30.1 LATE ONSET ALZHEIMER'S DISEASE WITHOUT BEHAVIORAL DISTURBANCE (HCC): ICD-10-CM

## 2019-01-01 DIAGNOSIS — Z78.9 IMPAIRED MOBILITY AND ADLS: ICD-10-CM

## 2019-01-01 DIAGNOSIS — F02.80 LATE ONSET ALZHEIMER'S DISEASE WITHOUT BEHAVIORAL DISTURBANCE (HCC): ICD-10-CM

## 2019-01-01 DIAGNOSIS — G62.89 OTHER POLYNEUROPATHY: ICD-10-CM

## 2019-01-01 DIAGNOSIS — I50.20 SYSTOLIC CONGESTIVE HEART FAILURE, UNSPECIFIED HF CHRONICITY (HCC): ICD-10-CM

## 2019-01-01 DIAGNOSIS — R47.1 DYSARTHRIA: Primary | ICD-10-CM

## 2019-01-01 DIAGNOSIS — G45.9 TIA (TRANSIENT ISCHEMIC ATTACK): ICD-10-CM

## 2019-01-01 DIAGNOSIS — R13.12 OROPHARYNGEAL DYSPHAGIA: ICD-10-CM

## 2019-01-01 DIAGNOSIS — R53.1 WEAKNESS: ICD-10-CM

## 2019-01-01 DIAGNOSIS — R27.0 ATAXIA: Primary | ICD-10-CM

## 2019-01-01 DIAGNOSIS — R53.1 GENERALIZED WEAKNESS: ICD-10-CM

## 2019-01-01 DIAGNOSIS — I50.21 ACUTE SYSTOLIC HEART FAILURE (HCC): ICD-10-CM

## 2019-01-01 DIAGNOSIS — R29.6 FALLS FREQUENTLY: Primary | ICD-10-CM

## 2019-01-01 DIAGNOSIS — R60.0 BILATERAL LEG EDEMA: Primary | ICD-10-CM

## 2019-01-01 DIAGNOSIS — I48.0 PAF (PAROXYSMAL ATRIAL FIBRILLATION) (HCC): ICD-10-CM

## 2019-01-01 DIAGNOSIS — R47.1 DYSARTHRIA: ICD-10-CM

## 2019-01-01 DIAGNOSIS — I69.354 HEMIPARESIS OF LEFT NONDOMINANT SIDE AS LATE EFFECT OF CEREBRAL INFARCTION (HCC): ICD-10-CM

## 2019-01-01 DIAGNOSIS — R53.1 WEAKNESS: Primary | ICD-10-CM

## 2019-01-01 LAB
ALBUMIN SERPL-MCNC: 3.4 G/DL (ref 3.5–5.2)
ALBUMIN SERPL-MCNC: 4.06 G/DL (ref 3.2–4.8)
ALBUMIN/GLOB SERPL: 1.2 G/DL
ALBUMIN/GLOB SERPL: 2 G/DL (ref 1.5–2.5)
ALP SERPL-CCNC: 83 U/L (ref 25–100)
ALP SERPL-CCNC: 83 U/L (ref 39–117)
ALT SERPL W P-5'-P-CCNC: 15 U/L (ref 1–41)
ALT SERPL W P-5'-P-CCNC: 20 U/L (ref 7–40)
ALT SERPL W P-5'-P-CCNC: 8 U/L (ref 1–41)
ANION GAP SERPL CALCULATED.3IONS-SCNC: 12 MMOL/L
ANION GAP SERPL CALCULATED.3IONS-SCNC: 14 MMOL/L
ANION GAP SERPL CALCULATED.3IONS-SCNC: 16.3 MMOL/L
ANION GAP SERPL CALCULATED.3IONS-SCNC: 18.1 MMOL/L
ANION GAP SERPL CALCULATED.3IONS-SCNC: 8 MMOL/L (ref 3–11)
AORTIC DIMENSIONLESS INDEX: 0.4 (DI)
APTT PPP: 37.3 SECONDS (ref 24–37)
AST SERPL-CCNC: 17 U/L (ref 1–40)
AST SERPL-CCNC: 20 U/L (ref 1–40)
AST SERPL-CCNC: 27 U/L (ref 0–33)
BACTERIA UR QL AUTO: NORMAL /HPF
BASOPHILS # BLD AUTO: 0.03 10*3/MM3 (ref 0–0.2)
BASOPHILS # BLD AUTO: 0.03 10*3/MM3 (ref 0–0.2)
BASOPHILS NFR BLD AUTO: 0.4 % (ref 0–1.5)
BASOPHILS NFR BLD AUTO: 0.5 % (ref 0–1)
BH CV ECHO MEAS - AI DEC SLOPE: 206.6 CM/SEC^2
BH CV ECHO MEAS - AI DEC SLOPE: 272.7 CM/SEC^2
BH CV ECHO MEAS - AI MAX PG: 53.1 MMHG
BH CV ECHO MEAS - AI MAX PG: 87.4 MMHG
BH CV ECHO MEAS - AI MAX VEL: 364.4 CM/SEC
BH CV ECHO MEAS - AI MAX VEL: 467.5 CM/SEC
BH CV ECHO MEAS - AI P1/2T: 502.2 MSEC
BH CV ECHO MEAS - AI P1/2T: 516.5 MSEC
BH CV ECHO MEAS - AO MAX PG (FULL): 20.3 MMHG
BH CV ECHO MEAS - AO MAX PG (FULL): 35.6 MMHG
BH CV ECHO MEAS - AO MAX PG: 22.5 MMHG
BH CV ECHO MEAS - AO MAX PG: 37.1 MMHG
BH CV ECHO MEAS - AO MEAN PG (FULL): 13.7 MMHG
BH CV ECHO MEAS - AO MEAN PG (FULL): 19.5 MMHG
BH CV ECHO MEAS - AO MEAN PG: 15 MMHG
BH CV ECHO MEAS - AO MEAN PG: 20.5 MMHG
BH CV ECHO MEAS - AO ROOT AREA (BSA CORRECTED): 1.8
BH CV ECHO MEAS - AO ROOT AREA (BSA CORRECTED): 2
BH CV ECHO MEAS - AO ROOT AREA: 10.9 CM^2
BH CV ECHO MEAS - AO ROOT AREA: 8.7 CM^2
BH CV ECHO MEAS - AO ROOT DIAM: 3.3 CM
BH CV ECHO MEAS - AO ROOT DIAM: 3.7 CM
BH CV ECHO MEAS - AO V2 MAX: 237.4 CM/SEC
BH CV ECHO MEAS - AO V2 MAX: 304.7 CM/SEC
BH CV ECHO MEAS - AO V2 MEAN: 187 CM/SEC
BH CV ECHO MEAS - AO V2 MEAN: 211.2 CM/SEC
BH CV ECHO MEAS - AO V2 VTI: 46 CM
BH CV ECHO MEAS - AO V2 VTI: 66.4 CM
BH CV ECHO MEAS - AVA(I,A): 0.86 CM^2
BH CV ECHO MEAS - AVA(I,A): 1.2 CM^2
BH CV ECHO MEAS - AVA(I,D): 0.86 CM^2
BH CV ECHO MEAS - AVA(I,D): 1.3 CM^2
BH CV ECHO MEAS - AVA(V,A): 0.81 CM^2
BH CV ECHO MEAS - AVA(V,A): 0.99 CM^2
BH CV ECHO MEAS - AVA(V,D): 0.81 CM^2
BH CV ECHO MEAS - AVA(V,D): 0.99 CM^2
BH CV ECHO MEAS - BSA(HAYCOCK): 1.9 M^2
BH CV ECHO MEAS - BSA(HAYCOCK): 1.9 M^2
BH CV ECHO MEAS - BSA: 1.8 M^2
BH CV ECHO MEAS - BSA: 1.9 M^2
BH CV ECHO MEAS - BZI_BMI: 26.3 KILOGRAMS/M^2
BH CV ECHO MEAS - BZI_BMI: 27.3 KILOGRAMS/M^2
BH CV ECHO MEAS - BZI_METRIC_HEIGHT: 167.6 CM
BH CV ECHO MEAS - BZI_METRIC_HEIGHT: 170.2 CM
BH CV ECHO MEAS - BZI_METRIC_WEIGHT: 73.9 KG
BH CV ECHO MEAS - BZI_METRIC_WEIGHT: 78.9 KG
BH CV ECHO MEAS - EDV(CUBED): 181.7 ML
BH CV ECHO MEAS - EDV(CUBED): 97.4 ML
BH CV ECHO MEAS - EDV(MOD-SP2): 155 ML
BH CV ECHO MEAS - EDV(MOD-SP2): 175 ML
BH CV ECHO MEAS - EDV(MOD-SP4): 150 ML
BH CV ECHO MEAS - EDV(MOD-SP4): 156 ML
BH CV ECHO MEAS - EDV(TEICH): 157.8 ML
BH CV ECHO MEAS - EDV(TEICH): 97.4 ML
BH CV ECHO MEAS - EF(CUBED): 31.4 %
BH CV ECHO MEAS - EF(CUBED): 34 %
BH CV ECHO MEAS - EF(MOD-BP): 29 %
BH CV ECHO MEAS - EF(MOD-BP): 30 %
BH CV ECHO MEAS - EF(MOD-SP2): 24.6 %
BH CV ECHO MEAS - EF(MOD-SP2): 38.7 %
BH CV ECHO MEAS - EF(MOD-SP4): 23.7 %
BH CV ECHO MEAS - EF(MOD-SP4): 41.3 %
BH CV ECHO MEAS - EF(TEICH): 25.2 %
BH CV ECHO MEAS - EF(TEICH): 27.9 %
BH CV ECHO MEAS - ESV(CUBED): 124.7 ML
BH CV ECHO MEAS - ESV(CUBED): 64.3 ML
BH CV ECHO MEAS - ESV(MOD-SP2): 132 ML
BH CV ECHO MEAS - ESV(MOD-SP2): 95 ML
BH CV ECHO MEAS - ESV(MOD-SP4): 119 ML
BH CV ECHO MEAS - ESV(MOD-SP4): 88 ML
BH CV ECHO MEAS - ESV(TEICH): 118 ML
BH CV ECHO MEAS - ESV(TEICH): 70.2 ML
BH CV ECHO MEAS - FS: 11.8 %
BH CV ECHO MEAS - FS: 12.9 %
BH CV ECHO MEAS - IVS/LVPW: 1
BH CV ECHO MEAS - IVS/LVPW: 1
BH CV ECHO MEAS - IVSD: 1.1 CM
BH CV ECHO MEAS - IVSD: 1.2 CM
BH CV ECHO MEAS - LA DIMENSION: 4.6 CM
BH CV ECHO MEAS - LA DIMENSION: 5.4 CM
BH CV ECHO MEAS - LA/AO: 1.2
BH CV ECHO MEAS - LA/AO: 1.6
BH CV ECHO MEAS - LAD MAJOR: 5.3 CM
BH CV ECHO MEAS - LAD MAJOR: 7.6 CM
BH CV ECHO MEAS - LAT PEAK E' VEL: 3.7 CM/SEC
BH CV ECHO MEAS - LAT PEAK E' VEL: 9.4 CM/SEC
BH CV ECHO MEAS - LATERAL E/E' RATIO: 19.9
BH CV ECHO MEAS - LATERAL E/E' RATIO: 8.6
BH CV ECHO MEAS - LV DIASTOLIC VOL/BSA (35-75): 81.8 ML/M^2
BH CV ECHO MEAS - LV DIASTOLIC VOL/BSA (35-75): 81.9 ML/M^2
BH CV ECHO MEAS - LV MASS(C)D: 176.5 GRAMS
BH CV ECHO MEAS - LV MASS(C)D: 282.9 GRAMS
BH CV ECHO MEAS - LV MASS(C)DI: 154.3 GRAMS/M^2
BH CV ECHO MEAS - LV MASS(C)DI: 92.6 GRAMS/M^2
BH CV ECHO MEAS - LV MAX PG: 1.5 MMHG
BH CV ECHO MEAS - LV MAX PG: 2.2 MMHG
BH CV ECHO MEAS - LV MEAN PG: 0.99 MMHG
BH CV ECHO MEAS - LV MEAN PG: 1.3 MMHG
BH CV ECHO MEAS - LV SYSTOLIC VOL/BSA (12-30): 48 ML/M^2
BH CV ECHO MEAS - LV SYSTOLIC VOL/BSA (12-30): 62.4 ML/M^2
BH CV ECHO MEAS - LV V1 MAX: 61.7 CM/SEC
BH CV ECHO MEAS - LV V1 MAX: 74.9 CM/SEC
BH CV ECHO MEAS - LV V1 MEAN: 47.2 CM/SEC
BH CV ECHO MEAS - LV V1 MEAN: 52.2 CM/SEC
BH CV ECHO MEAS - LV V1 VTI: 14.3 CM
BH CV ECHO MEAS - LV V1 VTI: 18.4 CM
BH CV ECHO MEAS - LVIDD: 4.6 CM
BH CV ECHO MEAS - LVIDD: 5.7 CM
BH CV ECHO MEAS - LVIDS: 4 CM
BH CV ECHO MEAS - LVIDS: 5 CM
BH CV ECHO MEAS - LVLD AP2: 8 CM
BH CV ECHO MEAS - LVLD AP2: 8.4 CM
BH CV ECHO MEAS - LVLD AP4: 7.7 CM
BH CV ECHO MEAS - LVLD AP4: 8.1 CM
BH CV ECHO MEAS - LVLS AP2: 7 CM
BH CV ECHO MEAS - LVLS AP2: 7.8 CM
BH CV ECHO MEAS - LVLS AP4: 6.6 CM
BH CV ECHO MEAS - LVLS AP4: 7.9 CM
BH CV ECHO MEAS - LVOT AREA (M): 3.1 CM^2
BH CV ECHO MEAS - LVOT AREA (M): 4.2 CM^2
BH CV ECHO MEAS - LVOT AREA: 3.1 CM^2
BH CV ECHO MEAS - LVOT AREA: 4 CM^2
BH CV ECHO MEAS - LVOT DIAM: 2 CM
BH CV ECHO MEAS - LVOT DIAM: 2.3 CM
BH CV ECHO MEAS - LVPWD: 1.1 CM
BH CV ECHO MEAS - LVPWD: 1.2 CM
BH CV ECHO MEAS - MED PEAK E' VEL: 4.1 CM/SEC
BH CV ECHO MEAS - MED PEAK E' VEL: 5.4 CM/SEC
BH CV ECHO MEAS - MEDIAL E/E' RATIO: 14.9
BH CV ECHO MEAS - MEDIAL E/E' RATIO: 18.3
BH CV ECHO MEAS - MV A MAX VEL: 28.9 CM/SEC
BH CV ECHO MEAS - MV DEC SLOPE: 715.8 CM/SEC^2
BH CV ECHO MEAS - MV DEC TIME: 0.15 SEC
BH CV ECHO MEAS - MV DEC TIME: 0.2 SEC
BH CV ECHO MEAS - MV E MAX VEL: 76.5 CM/SEC
BH CV ECHO MEAS - MV E MAX VEL: 82.4 CM/SEC
BH CV ECHO MEAS - MV E/A: 2.6
BH CV ECHO MEAS - MV P1/2T MAX VEL: 121.9 CM/SEC
BH CV ECHO MEAS - MV P1/2T: 49.9 MSEC
BH CV ECHO MEAS - MVA P1/2T LCG: 1.8 CM^2
BH CV ECHO MEAS - MVA(P1/2T): 4.4 CM^2
BH CV ECHO MEAS - PA ACC SLOPE: 266.3 CM/SEC^2
BH CV ECHO MEAS - PA ACC TIME: 0.18 SEC
BH CV ECHO MEAS - PA PR(ACCEL): -0.22 MMHG
BH CV ECHO MEAS - RAP SYSTOLE: 3 MMHG
BH CV ECHO MEAS - RAP SYSTOLE: 3 MMHG
BH CV ECHO MEAS - RV MAX PG: 0.55 MMHG
BH CV ECHO MEAS - RV V1 MAX: 37 CM/SEC
BH CV ECHO MEAS - RVSP: 31 MMHG
BH CV ECHO MEAS - RVSP: 32 MMHG
BH CV ECHO MEAS - SI(AO): 219.2 ML/M^2
BH CV ECHO MEAS - SI(AO): 379.6 ML/M^2
BH CV ECHO MEAS - SI(CUBED): 17.4 ML/M^2
BH CV ECHO MEAS - SI(CUBED): 31.1 ML/M^2
BH CV ECHO MEAS - SI(LVOT): 30.1 ML/M^2
BH CV ECHO MEAS - SI(LVOT): 31.3 ML/M^2
BH CV ECHO MEAS - SI(MOD-SP2): 23.5 ML/M^2
BH CV ECHO MEAS - SI(MOD-SP2): 31.5 ML/M^2
BH CV ECHO MEAS - SI(MOD-SP4): 19.4 ML/M^2
BH CV ECHO MEAS - SI(MOD-SP4): 33.8 ML/M^2
BH CV ECHO MEAS - SI(TEICH): 14.3 ML/M^2
BH CV ECHO MEAS - SI(TEICH): 21.7 ML/M^2
BH CV ECHO MEAS - SV(AO): 401.9 ML
BH CV ECHO MEAS - SV(AO): 723.5 ML
BH CV ECHO MEAS - SV(CUBED): 33.1 ML
BH CV ECHO MEAS - SV(CUBED): 57 ML
BH CV ECHO MEAS - SV(LVOT): 57.4 ML
BH CV ECHO MEAS - SV(LVOT): 57.4 ML
BH CV ECHO MEAS - SV(MOD-SP2): 43 ML
BH CV ECHO MEAS - SV(MOD-SP2): 60 ML
BH CV ECHO MEAS - SV(MOD-SP4): 37 ML
BH CV ECHO MEAS - SV(MOD-SP4): 62 ML
BH CV ECHO MEAS - SV(TEICH): 27.2 ML
BH CV ECHO MEAS - SV(TEICH): 39.7 ML
BH CV ECHO MEAS - TAPSE (>1.6): 1.6 CM2
BH CV ECHO MEAS - TAPSE (>1.6): 1.7 CM2
BH CV ECHO MEAS - TR MAX PG: 28 MMHG
BH CV ECHO MEAS - TR MAX PG: 29 MMHG
BH CV ECHO MEAS - TR MAX VEL: 263.7 CM/SEC
BH CV ECHO MEAS - TR MAX VEL: 269.3 CM/SEC
BH CV ECHO MEASUREMENTS AVERAGE E/E' RATIO: 11.14
BH CV ECHO MEASUREMENTS AVERAGE E/E' RATIO: 19.62
BH CV VAS BP LEFT ARM: NORMAL MMHG
BH CV XLRA - RV BASE: 3.9 CM
BH CV XLRA - RV BASE: 4 CM
BH CV XLRA - RV LENGTH: 5.2 CM
BH CV XLRA - RV LENGTH: 6.5 CM
BH CV XLRA - RV MID: 2.8 CM
BH CV XLRA - RV MID: 3.7 CM
BH CV XLRA - TDI S': 10.7 CM/SEC
BH CV XLRA - TDI S': 8.99 CM/SEC
BILIRUB SERPL-MCNC: 0.6 MG/DL (ref 0.2–1.2)
BILIRUB SERPL-MCNC: 0.8 MG/DL (ref 0.3–1.2)
BILIRUB UR QL STRIP: NEGATIVE
BUN BLD-MCNC: 12 MG/DL (ref 9–23)
BUN BLD-MCNC: 16 MG/DL (ref 8–23)
BUN BLD-MCNC: 19 MG/DL (ref 8–23)
BUN BLD-MCNC: 19 MG/DL (ref 8–23)
BUN BLD-MCNC: 20 MG/DL (ref 8–23)
BUN BLDA-MCNC: 33 MG/DL (ref 8–26)
BUN/CREAT SERPL: 10.1 (ref 7–25)
BUN/CREAT SERPL: 13.1 (ref 7–25)
BUN/CREAT SERPL: 14.5 (ref 7–25)
BUN/CREAT SERPL: 15 (ref 7–25)
BUN/CREAT SERPL: 16 (ref 7–25)
CA-I BLDA-SCNC: 1.21 MMOL/L (ref 1.2–1.32)
CALCIUM SPEC-SCNC: 8.9 MG/DL (ref 8.6–10.5)
CALCIUM SPEC-SCNC: 9.4 MG/DL (ref 8.6–10.5)
CALCIUM SPEC-SCNC: 9.8 MG/DL (ref 8.6–10.5)
CALCIUM SPEC-SCNC: 9.8 MG/DL (ref 8.7–10.4)
CALCIUM SPEC-SCNC: 9.9 MG/DL (ref 8.6–10.5)
CHLORIDE BLDA-SCNC: 102 MMOL/L (ref 98–109)
CHLORIDE SERPL-SCNC: 105 MMOL/L (ref 98–107)
CHLORIDE SERPL-SCNC: 106 MMOL/L (ref 98–107)
CHLORIDE SERPL-SCNC: 108 MMOL/L (ref 98–107)
CHLORIDE SERPL-SCNC: 108 MMOL/L (ref 98–107)
CHLORIDE SERPL-SCNC: 108 MMOL/L (ref 99–109)
CHOLEST SERPL-MCNC: 129 MG/DL (ref 0–200)
CK SERPL-CCNC: 74 U/L (ref 26–174)
CLARITY UR: CLEAR
CO2 BLDA-SCNC: 30 MMOL/L (ref 24–29)
CO2 SERPL-SCNC: 20.7 MMOL/L (ref 22–29)
CO2 SERPL-SCNC: 20.9 MMOL/L (ref 22–29)
CO2 SERPL-SCNC: 22 MMOL/L (ref 22–29)
CO2 SERPL-SCNC: 26 MMOL/L (ref 22–29)
CO2 SERPL-SCNC: 29 MMOL/L (ref 20–31)
COLOR UR: YELLOW
CREAT BLD-MCNC: 1 MG/DL (ref 0.76–1.27)
CREAT BLD-MCNC: 1.19 MG/DL (ref 0.6–1.3)
CREAT BLD-MCNC: 1.27 MG/DL (ref 0.76–1.27)
CREAT BLD-MCNC: 1.38 MG/DL (ref 0.76–1.27)
CREAT BLD-MCNC: 1.45 MG/DL (ref 0.76–1.27)
CREAT BLDA-MCNC: 1.8 MG/DL (ref 0.6–1.3)
DEPRECATED RDW RBC AUTO: 45.4 FL (ref 37–54)
DEPRECATED RDW RBC AUTO: 47.4 FL (ref 37–54)
DEPRECATED RDW RBC AUTO: 47.8 FL (ref 37–54)
DEPRECATED RDW RBC AUTO: 47.9 FL (ref 37–54)
EOSINOPHIL # BLD AUTO: 0.1 10*3/MM3 (ref 0–0.3)
EOSINOPHIL # BLD AUTO: 0.11 10*3/MM3 (ref 0–0.4)
EOSINOPHIL NFR BLD AUTO: 1.4 % (ref 0.3–6.2)
EOSINOPHIL NFR BLD AUTO: 1.6 % (ref 0–3)
ERYTHROCYTE [DISTWIDTH] IN BLOOD BY AUTOMATED COUNT: 13.2 % (ref 11.3–14.5)
ERYTHROCYTE [DISTWIDTH] IN BLOOD BY AUTOMATED COUNT: 13.8 % (ref 12.3–15.4)
ERYTHROCYTE [DISTWIDTH] IN BLOOD BY AUTOMATED COUNT: 14.2 % (ref 12.3–15.4)
ERYTHROCYTE [DISTWIDTH] IN BLOOD BY AUTOMATED COUNT: 14.4 % (ref 12.3–15.4)
FOLATE SERPL-MCNC: 9 NG/ML (ref 3.2–20)
GFR SERPL CREATININE-BSD FRML MDRD: 47 ML/MIN/1.73
GFR SERPL CREATININE-BSD FRML MDRD: 50 ML/MIN/1.73
GFR SERPL CREATININE-BSD FRML MDRD: 55 ML/MIN/1.73
GFR SERPL CREATININE-BSD FRML MDRD: 59 ML/MIN/1.73
GFR SERPL CREATININE-BSD FRML MDRD: 72 ML/MIN/1.73
GLOBULIN UR ELPH-MCNC: 2 GM/DL
GLOBULIN UR ELPH-MCNC: 2.8 GM/DL
GLUCOSE BLD-MCNC: 110 MG/DL (ref 65–99)
GLUCOSE BLD-MCNC: 110 MG/DL (ref 70–100)
GLUCOSE BLD-MCNC: 91 MG/DL (ref 65–99)
GLUCOSE BLD-MCNC: 97 MG/DL (ref 65–99)
GLUCOSE BLD-MCNC: 99 MG/DL (ref 65–99)
GLUCOSE BLDC GLUCOMTR-MCNC: 108 MG/DL (ref 70–130)
GLUCOSE BLDC GLUCOMTR-MCNC: 96 MG/DL (ref 70–130)
GLUCOSE UR STRIP-MCNC: NEGATIVE MG/DL
HBA1C MFR BLD: 5.4 % (ref 4.8–5.6)
HCT VFR BLD AUTO: 35.1 % (ref 37.5–51)
HCT VFR BLD AUTO: 37.4 % (ref 37.5–51)
HCT VFR BLD AUTO: 41 % (ref 37.5–51)
HCT VFR BLD AUTO: 42.6 % (ref 38.9–50.9)
HCT VFR BLDA CALC: 42 % (ref 38–51)
HDLC SERPL-MCNC: 40 MG/DL (ref 40–60)
HGB BLD-MCNC: 11.1 G/DL (ref 13–17.7)
HGB BLD-MCNC: 11.8 G/DL (ref 13–17.7)
HGB BLD-MCNC: 12.8 G/DL (ref 13–17.7)
HGB BLD-MCNC: 13.2 G/DL (ref 13.1–17.5)
HGB BLDA-MCNC: 14.3 G/DL (ref 12–17)
HGB UR QL STRIP.AUTO: NEGATIVE
HOLD SPECIMEN: NORMAL
HOLD SPECIMEN: NORMAL
HYALINE CASTS UR QL AUTO: NORMAL /LPF
IMM GRANULOCYTES # BLD AUTO: 0.01 10*3/MM3 (ref 0–0.05)
IMM GRANULOCYTES # BLD AUTO: 0.04 10*3/MM3 (ref 0–0.05)
IMM GRANULOCYTES NFR BLD AUTO: 0.2 % (ref 0–0.6)
IMM GRANULOCYTES NFR BLD AUTO: 0.5 % (ref 0–0.5)
INR PPP: 1.5 (ref 0.8–1.2)
KETONES UR QL STRIP: NEGATIVE
LDLC SERPL CALC-MCNC: 72 MG/DL (ref 0–100)
LDLC/HDLC SERPL: 1.81 {RATIO}
LEFT ATRIUM VOLUME INDEX: 55.6 ML/M^2
LEFT ATRIUM VOLUME INDEX: 93.4 ML/M^2
LEFT ATRIUM VOLUME: 102 ML
LEFT ATRIUM VOLUME: 178 ML
LEUKOCYTE ESTERASE UR QL STRIP.AUTO: ABNORMAL
LV EF 2D ECHO EST: 32 %
LYMPHOCYTES # BLD AUTO: 0.67 10*3/MM3 (ref 0.7–3.1)
LYMPHOCYTES # BLD AUTO: 0.9 10*3/MM3 (ref 0.6–4.8)
LYMPHOCYTES NFR BLD AUTO: 14 % (ref 24–44)
LYMPHOCYTES NFR BLD AUTO: 8.5 % (ref 19.6–45.3)
Lab: NORMAL
MAXIMAL PREDICTED HEART RATE: 140 BPM
MAXIMAL PREDICTED HEART RATE: 140 BPM
MCH RBC QN AUTO: 29 PG (ref 26.6–33)
MCH RBC QN AUTO: 29.1 PG (ref 26.6–33)
MCH RBC QN AUTO: 29.5 PG (ref 26.6–33)
MCH RBC QN AUTO: 29.5 PG (ref 27–31)
MCHC RBC AUTO-ENTMCNC: 31 G/DL (ref 32–36)
MCHC RBC AUTO-ENTMCNC: 31.2 G/DL (ref 31.5–35.7)
MCHC RBC AUTO-ENTMCNC: 31.6 G/DL (ref 31.5–35.7)
MCHC RBC AUTO-ENTMCNC: 31.6 G/DL (ref 31.5–35.7)
MCV RBC AUTO: 91.9 FL (ref 79–97)
MCV RBC AUTO: 92.8 FL (ref 79–97)
MCV RBC AUTO: 93.5 FL (ref 79–97)
MCV RBC AUTO: 95.1 FL (ref 80–99)
METHYLMALONATE SERPL-SCNC: 324 NMOL/L (ref 0–378)
MONOCYTES # BLD AUTO: 0.4 10*3/MM3 (ref 0–1)
MONOCYTES # BLD AUTO: 0.44 10*3/MM3 (ref 0.1–0.9)
MONOCYTES NFR BLD AUTO: 5.6 % (ref 5–12)
MONOCYTES NFR BLD AUTO: 6.2 % (ref 0–12)
NEUTROPHILS # BLD AUTO: 4.98 10*3/MM3 (ref 1.5–8.3)
NEUTROPHILS # BLD AUTO: 6.61 10*3/MM3 (ref 1.7–7)
NEUTROPHILS NFR BLD AUTO: 77.7 % (ref 41–71)
NEUTROPHILS NFR BLD AUTO: 83.6 % (ref 42.7–76)
NITRITE UR QL STRIP: NEGATIVE
NRBC BLD AUTO-RTO: 0 /100 WBC (ref 0–0.2)
NT-PROBNP SERPL-MCNC: ABNORMAL PG/ML (ref 5–1800)
NT-PROBNP SERPL-MCNC: ABNORMAL PG/ML (ref 5–1800)
PH UR STRIP.AUTO: 5.5 [PH] (ref 5–8)
PLATELET # BLD AUTO: 112 10*3/MM3 (ref 140–450)
PLATELET # BLD AUTO: 137 10*3/MM3 (ref 140–450)
PLATELET # BLD AUTO: 149 10*3/MM3 (ref 150–450)
PLATELET # BLD AUTO: 151 10*3/MM3 (ref 140–450)
PMV BLD AUTO: 11.8 FL (ref 6–12)
PMV BLD AUTO: 12.1 FL (ref 6–12)
PMV BLD AUTO: 12.2 FL (ref 6–12)
PMV BLD AUTO: 12.8 FL (ref 6–12)
POTASSIUM BLD-SCNC: 3.1 MMOL/L (ref 3.5–5.2)
POTASSIUM BLD-SCNC: 3.5 MMOL/L (ref 3.5–5.2)
POTASSIUM BLD-SCNC: 3.6 MMOL/L (ref 3.5–5.2)
POTASSIUM BLD-SCNC: 3.6 MMOL/L (ref 3.5–5.2)
POTASSIUM BLD-SCNC: 4 MMOL/L (ref 3.5–5.2)
POTASSIUM BLD-SCNC: 4.1 MMOL/L (ref 3.5–5.5)
POTASSIUM BLD-SCNC: 4.3 MMOL/L (ref 3.5–5.2)
POTASSIUM BLDA-SCNC: 5.7 MMOL/L (ref 3.5–4.9)
PROT SERPL-MCNC: 6.1 G/DL (ref 5.7–8.2)
PROT SERPL-MCNC: 6.2 G/DL (ref 6–8.5)
PROT UR QL STRIP: NEGATIVE
PROTHROMBIN TIME: 17.4 SECONDS (ref 12.8–15.2)
RBC # BLD AUTO: 3.82 10*6/MM3 (ref 4.14–5.8)
RBC # BLD AUTO: 4 10*6/MM3 (ref 4.14–5.8)
RBC # BLD AUTO: 4.42 10*6/MM3 (ref 4.14–5.8)
RBC # BLD AUTO: 4.48 10*6/MM3 (ref 4.2–5.76)
RBC # UR: NORMAL /HPF
REF LAB TEST METHOD: NORMAL
RPR SER QL: NORMAL
SODIUM BLD-SCNC: 143 MMOL/L (ref 136–145)
SODIUM BLD-SCNC: 144 MMOL/L (ref 136–145)
SODIUM BLD-SCNC: 145 MMOL/L (ref 132–146)
SODIUM BLD-SCNC: 145 MMOL/L (ref 136–145)
SODIUM BLD-SCNC: 145 MMOL/L (ref 136–145)
SODIUM BLDA-SCNC: 142 MMOL/L (ref 138–146)
SP GR UR STRIP: 1.02 (ref 1–1.03)
SQUAMOUS #/AREA URNS HPF: NORMAL /HPF
STRESS TARGET HR: 119 BPM
STRESS TARGET HR: 119 BPM
T4 FREE SERPL-MCNC: 1.16 NG/DL (ref 0.89–1.76)
TRIGL SERPL-MCNC: 84 MG/DL (ref 0–150)
TROPONIN T SERPL-MCNC: 0.09 NG/ML (ref 0–0.03)
TROPONIN T SERPL-MCNC: 0.1 NG/ML (ref 0–0.03)
TROPONIN T SERPL-MCNC: 0.15 NG/ML (ref 0–0.03)
TSH SERPL DL<=0.05 MIU/L-ACNC: 1.23 MIU/ML (ref 0.35–5.35)
UROBILINOGEN UR QL STRIP: ABNORMAL
VIT B12 BLD-MCNC: 428 PG/ML (ref 211–911)
VLDLC SERPL-MCNC: 16.8 MG/DL
WBC NRBC COR # BLD: 5.57 10*3/MM3 (ref 3.4–10.8)
WBC NRBC COR # BLD: 6.41 10*3/MM3 (ref 3.5–10.8)
WBC NRBC COR # BLD: 7.44 10*3/MM3 (ref 3.4–10.8)
WBC NRBC COR # BLD: 7.9 10*3/MM3 (ref 3.4–10.8)
WBC UR QL AUTO: NORMAL /HPF
WHOLE BLOOD HOLD SPECIMEN: NORMAL
WHOLE BLOOD HOLD SPECIMEN: NORMAL

## 2019-01-01 PROCEDURE — 70553 MRI BRAIN STEM W/O & W/DYE: CPT

## 2019-01-01 PROCEDURE — 97165 OT EVAL LOW COMPLEX 30 MIN: CPT

## 2019-01-01 PROCEDURE — 70450 CT HEAD/BRAIN W/O DYE: CPT

## 2019-01-01 PROCEDURE — 99214 OFFICE O/P EST MOD 30 MIN: CPT | Performed by: PHYSICIAN ASSISTANT

## 2019-01-01 PROCEDURE — 70498 CT ANGIOGRAPHY NECK: CPT

## 2019-01-01 PROCEDURE — 85025 COMPLETE CBC W/AUTO DIFF WBC: CPT | Performed by: EMERGENCY MEDICINE

## 2019-01-01 PROCEDURE — 99203 OFFICE O/P NEW LOW 30 MIN: CPT | Performed by: PSYCHIATRY & NEUROLOGY

## 2019-01-01 PROCEDURE — 99232 SBSQ HOSP IP/OBS MODERATE 35: CPT | Performed by: INTERNAL MEDICINE

## 2019-01-01 PROCEDURE — 85027 COMPLETE CBC AUTOMATED: CPT | Performed by: PHYSICIAN ASSISTANT

## 2019-01-01 PROCEDURE — 80048 BASIC METABOLIC PNL TOTAL CA: CPT | Performed by: NURSE PRACTITIONER

## 2019-01-01 PROCEDURE — 99239 HOSP IP/OBS DSCHRG MGMT >30: CPT | Performed by: INTERNAL MEDICINE

## 2019-01-01 PROCEDURE — 85014 HEMATOCRIT: CPT

## 2019-01-01 PROCEDURE — 71045 X-RAY EXAM CHEST 1 VIEW: CPT

## 2019-01-01 PROCEDURE — G0378 HOSPITAL OBSERVATION PER HR: HCPCS

## 2019-01-01 PROCEDURE — 74230 X-RAY XM SWLNG FUNCJ C+: CPT

## 2019-01-01 PROCEDURE — 80053 COMPREHEN METABOLIC PANEL: CPT | Performed by: PHYSICIAN ASSISTANT

## 2019-01-01 PROCEDURE — 83880 ASSAY OF NATRIURETIC PEPTIDE: CPT | Performed by: NURSE PRACTITIONER

## 2019-01-01 PROCEDURE — 97530 THERAPEUTIC ACTIVITIES: CPT

## 2019-01-01 PROCEDURE — 95886 MUSC TEST DONE W/N TEST COMP: CPT

## 2019-01-01 PROCEDURE — 99222 1ST HOSP IP/OBS MODERATE 55: CPT | Performed by: INTERNAL MEDICINE

## 2019-01-01 PROCEDURE — 80053 COMPREHEN METABOLIC PANEL: CPT | Performed by: PSYCHIATRY & NEUROLOGY

## 2019-01-01 PROCEDURE — 97116 GAIT TRAINING THERAPY: CPT

## 2019-01-01 PROCEDURE — 84484 ASSAY OF TROPONIN QUANT: CPT | Performed by: PHYSICIAN ASSISTANT

## 2019-01-01 PROCEDURE — 86592 SYPHILIS TEST NON-TREP QUAL: CPT | Performed by: PSYCHIATRY & NEUROLOGY

## 2019-01-01 PROCEDURE — 71046 X-RAY EXAM CHEST 2 VIEWS: CPT

## 2019-01-01 PROCEDURE — 84132 ASSAY OF SERUM POTASSIUM: CPT | Performed by: INTERNAL MEDICINE

## 2019-01-01 PROCEDURE — 85730 THROMBOPLASTIN TIME PARTIAL: CPT | Performed by: EMERGENCY MEDICINE

## 2019-01-01 PROCEDURE — 84460 ALANINE AMINO (ALT) (SGPT): CPT | Performed by: EMERGENCY MEDICINE

## 2019-01-01 PROCEDURE — 70551 MRI BRAIN STEM W/O DYE: CPT

## 2019-01-01 PROCEDURE — 82746 ASSAY OF FOLIC ACID SERUM: CPT | Performed by: PSYCHIATRY & NEUROLOGY

## 2019-01-01 PROCEDURE — 99214 OFFICE O/P EST MOD 30 MIN: CPT | Performed by: NURSE PRACTITIONER

## 2019-01-01 PROCEDURE — 70496 CT ANGIOGRAPHY HEAD: CPT

## 2019-01-01 PROCEDURE — 97110 THERAPEUTIC EXERCISES: CPT

## 2019-01-01 PROCEDURE — 95911 NRV CNDJ TEST 9-10 STUDIES: CPT

## 2019-01-01 PROCEDURE — 25010000002 SULFUR HEXAFLUORIDE MICROSPH 60.7-25 MG RECONSTITUTED SUSPENSION: Performed by: PHYSICIAN ASSISTANT

## 2019-01-01 PROCEDURE — 92611 MOTION FLUOROSCOPY/SWALLOW: CPT

## 2019-01-01 PROCEDURE — 80048 BASIC METABOLIC PNL TOTAL CA: CPT | Performed by: INTERNAL MEDICINE

## 2019-01-01 PROCEDURE — 99214 OFFICE O/P EST MOD 30 MIN: CPT | Performed by: PSYCHIATRY & NEUROLOGY

## 2019-01-01 PROCEDURE — 97162 PT EVAL MOD COMPLEX 30 MIN: CPT

## 2019-01-01 PROCEDURE — 0 GADOBENATE DIMEGLUMINE 529 MG/ML SOLUTION: Performed by: PSYCHIATRY & NEUROLOGY

## 2019-01-01 PROCEDURE — 92507 TX SP LANG VOICE COMM INDIV: CPT

## 2019-01-01 PROCEDURE — 99214 OFFICE O/P EST MOD 30 MIN: CPT | Performed by: INTERNAL MEDICINE

## 2019-01-01 PROCEDURE — 93306 TTE W/DOPPLER COMPLETE: CPT

## 2019-01-01 PROCEDURE — 97163 PT EVAL HIGH COMPLEX 45 MIN: CPT

## 2019-01-01 PROCEDURE — 25010000002 SULFUR HEXAFLUORIDE MICROSPH 60.7-25 MG RECONSTITUTED SUSPENSION: Performed by: INTERNAL MEDICINE

## 2019-01-01 PROCEDURE — 99217 PR OBSERVATION CARE DISCHARGE MANAGEMENT: CPT | Performed by: INTERNAL MEDICINE

## 2019-01-01 PROCEDURE — 83921 ORGANIC ACID SINGLE QUANT: CPT | Performed by: PSYCHIATRY & NEUROLOGY

## 2019-01-01 PROCEDURE — 92610 EVALUATE SWALLOWING FUNCTION: CPT

## 2019-01-01 PROCEDURE — 93321 DOPPLER ECHO F-UP/LMTD STD: CPT | Performed by: INTERNAL MEDICINE

## 2019-01-01 PROCEDURE — 84439 ASSAY OF FREE THYROXINE: CPT | Performed by: PSYCHIATRY & NEUROLOGY

## 2019-01-01 PROCEDURE — 92526 ORAL FUNCTION THERAPY: CPT

## 2019-01-01 PROCEDURE — G0180 MD CERTIFICATION HHA PATIENT: HCPCS | Performed by: INTERNAL MEDICINE

## 2019-01-01 PROCEDURE — 99231 SBSQ HOSP IP/OBS SF/LOW 25: CPT | Performed by: PSYCHIATRY & NEUROLOGY

## 2019-01-01 PROCEDURE — 85610 PROTHROMBIN TIME: CPT

## 2019-01-01 PROCEDURE — 83036 HEMOGLOBIN GLYCOSYLATED A1C: CPT | Performed by: INTERNAL MEDICINE

## 2019-01-01 PROCEDURE — 99223 1ST HOSP IP/OBS HIGH 75: CPT | Performed by: PSYCHIATRY & NEUROLOGY

## 2019-01-01 PROCEDURE — 85025 COMPLETE CBC W/AUTO DIFF WBC: CPT | Performed by: PSYCHIATRY & NEUROLOGY

## 2019-01-01 PROCEDURE — 99495 TRANSJ CARE MGMT MOD F2F 14D: CPT | Performed by: FAMILY MEDICINE

## 2019-01-01 PROCEDURE — 82607 VITAMIN B-12: CPT | Performed by: PSYCHIATRY & NEUROLOGY

## 2019-01-01 PROCEDURE — 0 IOPAMIDOL PER 1 ML: Performed by: EMERGENCY MEDICINE

## 2019-01-01 PROCEDURE — 84443 ASSAY THYROID STIM HORMONE: CPT | Performed by: PSYCHIATRY & NEUROLOGY

## 2019-01-01 PROCEDURE — 84484 ASSAY OF TROPONIN QUANT: CPT | Performed by: EMERGENCY MEDICINE

## 2019-01-01 PROCEDURE — 93005 ELECTROCARDIOGRAM TRACING: CPT | Performed by: EMERGENCY MEDICINE

## 2019-01-01 PROCEDURE — 99285 EMERGENCY DEPT VISIT HI MDM: CPT

## 2019-01-01 PROCEDURE — 99220 PR INITIAL OBSERVATION CARE/DAY 70 MINUTES: CPT | Performed by: FAMILY MEDICINE

## 2019-01-01 PROCEDURE — 99233 SBSQ HOSP IP/OBS HIGH 50: CPT | Performed by: INTERNAL MEDICINE

## 2019-01-01 PROCEDURE — 36415 COLL VENOUS BLD VENIPUNCTURE: CPT | Performed by: PSYCHIATRY & NEUROLOGY

## 2019-01-01 PROCEDURE — 84450 TRANSFERASE (AST) (SGOT): CPT | Performed by: EMERGENCY MEDICINE

## 2019-01-01 PROCEDURE — 81001 URINALYSIS AUTO W/SCOPE: CPT | Performed by: INTERNAL MEDICINE

## 2019-01-01 PROCEDURE — 93308 TTE F-UP OR LMTD: CPT | Performed by: INTERNAL MEDICINE

## 2019-01-01 PROCEDURE — 99213 OFFICE O/P EST LOW 20 MIN: CPT | Performed by: INTERNAL MEDICINE

## 2019-01-01 PROCEDURE — 72192 CT PELVIS W/O DYE: CPT

## 2019-01-01 PROCEDURE — 72141 MRI NECK SPINE W/O DYE: CPT

## 2019-01-01 PROCEDURE — A9577 INJ MULTIHANCE: HCPCS | Performed by: PSYCHIATRY & NEUROLOGY

## 2019-01-01 PROCEDURE — 80047 BASIC METABLC PNL IONIZED CA: CPT

## 2019-01-01 PROCEDURE — 82962 GLUCOSE BLOOD TEST: CPT

## 2019-01-01 PROCEDURE — 0042T HC CT CEREBRAL PERFUSION W/WO CONTRAST: CPT

## 2019-01-01 PROCEDURE — 92523 SPEECH SOUND LANG COMPREHEN: CPT

## 2019-01-01 PROCEDURE — 80061 LIPID PANEL: CPT | Performed by: INTERNAL MEDICINE

## 2019-01-01 PROCEDURE — 85027 COMPLETE CBC AUTOMATED: CPT | Performed by: INTERNAL MEDICINE

## 2019-01-01 PROCEDURE — 93306 TTE W/DOPPLER COMPLETE: CPT | Performed by: INTERNAL MEDICINE

## 2019-01-01 PROCEDURE — 93325 DOPPLER ECHO COLOR FLOW MAPG: CPT | Performed by: INTERNAL MEDICINE

## 2019-01-01 PROCEDURE — 99232 SBSQ HOSP IP/OBS MODERATE 35: CPT | Performed by: PSYCHIATRY & NEUROLOGY

## 2019-01-01 PROCEDURE — 82550 ASSAY OF CK (CPK): CPT | Performed by: PSYCHIATRY & NEUROLOGY

## 2019-01-01 RX ORDER — ASPIRIN 81 MG/1
324 TABLET, CHEWABLE ORAL ONCE
Status: COMPLETED | OUTPATIENT
Start: 2019-01-01 | End: 2019-01-01

## 2019-01-01 RX ORDER — FUROSEMIDE 20 MG/1
20 TABLET ORAL 2 TIMES DAILY
COMMUNITY
End: 2019-01-01 | Stop reason: SDUPTHER

## 2019-01-01 RX ORDER — FOLIC ACID 1 MG/1
1 TABLET ORAL DAILY
Qty: 30 TABLET | Refills: 11 | Status: SHIPPED | OUTPATIENT
Start: 2019-01-01

## 2019-01-01 RX ORDER — DOCUSATE SODIUM 50 MG/5 ML
100 LIQUID (ML) ORAL 2 TIMES DAILY
Status: DISCONTINUED | OUTPATIENT
Start: 2019-01-01 | End: 2019-01-01 | Stop reason: HOSPADM

## 2019-01-01 RX ORDER — SODIUM CHLORIDE 0.9 % (FLUSH) 0.9 %
3-10 SYRINGE (ML) INJECTION AS NEEDED
Status: DISCONTINUED | OUTPATIENT
Start: 2019-01-01 | End: 2019-01-01 | Stop reason: HOSPADM

## 2019-01-01 RX ORDER — CARVEDILOL 6.25 MG/1
6.25 TABLET ORAL EVERY 12 HOURS SCHEDULED
Status: DISCONTINUED | OUTPATIENT
Start: 2019-01-01 | End: 2019-01-01 | Stop reason: HOSPADM

## 2019-01-01 RX ORDER — FAMOTIDINE 20 MG/1
20 TABLET, FILM COATED ORAL NIGHTLY
Status: DISCONTINUED | OUTPATIENT
Start: 2019-01-01 | End: 2019-01-01 | Stop reason: HOSPADM

## 2019-01-01 RX ORDER — DOCUSATE SODIUM 100 MG/1
100 CAPSULE, LIQUID FILLED ORAL 2 TIMES DAILY
Status: DISCONTINUED | OUTPATIENT
Start: 2019-01-01 | End: 2019-01-01

## 2019-01-01 RX ORDER — MEMANTINE HYDROCHLORIDE 10 MG/1
10 TABLET ORAL DAILY
Status: DISCONTINUED | OUTPATIENT
Start: 2019-01-01 | End: 2019-01-01 | Stop reason: HOSPADM

## 2019-01-01 RX ORDER — FUROSEMIDE 40 MG/1
40 TABLET ORAL DAILY PRN
Qty: 30 TABLET | Refills: 11 | Status: SHIPPED | OUTPATIENT
Start: 2019-01-01 | End: 2020-01-01 | Stop reason: HOSPADM

## 2019-01-01 RX ORDER — ATORVASTATIN CALCIUM 40 MG/1
80 TABLET, FILM COATED ORAL NIGHTLY
Status: DISCONTINUED | OUTPATIENT
Start: 2019-01-01 | End: 2019-01-01 | Stop reason: HOSPADM

## 2019-01-01 RX ORDER — LOSARTAN POTASSIUM 50 MG/1
50 TABLET ORAL
Status: DISCONTINUED | OUTPATIENT
Start: 2019-01-01 | End: 2019-01-01 | Stop reason: HOSPADM

## 2019-01-01 RX ORDER — ASPIRIN 81 MG/1
81 TABLET, CHEWABLE ORAL DAILY
Status: DISCONTINUED | OUTPATIENT
Start: 2019-01-01 | End: 2019-01-01 | Stop reason: HOSPADM

## 2019-01-01 RX ORDER — CARVEDILOL 3.12 MG/1
3.12 TABLET ORAL EVERY 12 HOURS SCHEDULED
Status: DISCONTINUED | OUTPATIENT
Start: 2019-01-01 | End: 2019-01-01 | Stop reason: HOSPADM

## 2019-01-01 RX ORDER — ASPIRIN 81 MG/1
81 TABLET, CHEWABLE ORAL DAILY
Start: 2019-01-01 | End: 2019-01-01 | Stop reason: SDUPTHER

## 2019-01-01 RX ORDER — FUROSEMIDE 20 MG/1
20 TABLET ORAL DAILY
Qty: 30 TABLET | Refills: 0 | Status: SHIPPED | OUTPATIENT
Start: 2019-01-01 | End: 2019-01-01

## 2019-01-01 RX ORDER — FUROSEMIDE 20 MG/1
TABLET ORAL
Qty: 30 TABLET | Refills: 0 | OUTPATIENT
Start: 2019-01-01

## 2019-01-01 RX ORDER — FOLIC ACID 1 MG/1
1 TABLET ORAL DAILY
Status: DISCONTINUED | OUTPATIENT
Start: 2019-01-01 | End: 2019-01-01 | Stop reason: HOSPADM

## 2019-01-01 RX ORDER — CHOLECALCIFEROL (VITAMIN D3) 125 MCG
5 CAPSULE ORAL NIGHTLY PRN
Status: DISCONTINUED | OUTPATIENT
Start: 2019-01-01 | End: 2019-01-01 | Stop reason: HOSPADM

## 2019-01-01 RX ORDER — CARVEDILOL 3.12 MG/1
3.12 TABLET ORAL EVERY 12 HOURS SCHEDULED
Qty: 60 TABLET | Refills: 11 | Status: SHIPPED | OUTPATIENT
Start: 2019-01-01

## 2019-01-01 RX ORDER — POTASSIUM CHLORIDE 750 MG/1
10 TABLET, EXTENDED RELEASE ORAL 2 TIMES DAILY
COMMUNITY
End: 2019-01-01

## 2019-01-01 RX ORDER — LANOLIN ALCOHOL/MO/W.PET/CERES
1000 CREAM (GRAM) TOPICAL EVERY OTHER DAY
Qty: 45 TABLET | Refills: 3 | Status: SHIPPED | OUTPATIENT
Start: 2019-01-01 | End: 2019-01-01

## 2019-01-01 RX ORDER — SODIUM CHLORIDE 0.9 % (FLUSH) 0.9 %
3 SYRINGE (ML) INJECTION EVERY 12 HOURS SCHEDULED
Status: DISCONTINUED | OUTPATIENT
Start: 2019-01-01 | End: 2019-01-01 | Stop reason: HOSPADM

## 2019-01-01 RX ORDER — BISACODYL 10 MG
10 SUPPOSITORY, RECTAL RECTAL DAILY
Status: DISCONTINUED | OUTPATIENT
Start: 2019-01-01 | End: 2019-01-01 | Stop reason: HOSPADM

## 2019-01-01 RX ORDER — ATORVASTATIN CALCIUM 80 MG/1
80 TABLET, FILM COATED ORAL NIGHTLY
Qty: 90 TABLET | Refills: 3 | Status: SHIPPED | OUTPATIENT
Start: 2019-01-01

## 2019-01-01 RX ORDER — CARVEDILOL 3.12 MG/1
6.25 TABLET ORAL EVERY 12 HOURS SCHEDULED
Qty: 60 TABLET | Refills: 0
Start: 2019-01-01 | End: 2019-01-01 | Stop reason: SDUPTHER

## 2019-01-01 RX ORDER — SODIUM CHLORIDE 9 MG/ML
100 INJECTION, SOLUTION INTRAVENOUS CONTINUOUS
Status: DISCONTINUED | OUTPATIENT
Start: 2019-01-01 | End: 2019-01-01

## 2019-01-01 RX ORDER — RIVASTIGMINE 4.6 MG/24H
1 PATCH, EXTENDED RELEASE TRANSDERMAL DAILY
COMMUNITY
End: 2019-01-01

## 2019-01-01 RX ORDER — POTASSIUM CHLORIDE 750 MG/1
20 TABLET, FILM COATED, EXTENDED RELEASE ORAL DAILY PRN
Qty: 60 TABLET | Refills: 11 | Status: SHIPPED | OUTPATIENT
Start: 2019-01-01 | End: 2020-01-01 | Stop reason: HOSPADM

## 2019-01-01 RX ORDER — POTASSIUM CHLORIDE 750 MG/1
40 CAPSULE, EXTENDED RELEASE ORAL AS NEEDED
Status: DISCONTINUED | OUTPATIENT
Start: 2019-01-01 | End: 2019-01-01 | Stop reason: HOSPADM

## 2019-01-01 RX ORDER — POTASSIUM CHLORIDE 750 MG/1
10 TABLET, FILM COATED, EXTENDED RELEASE ORAL DAILY
Qty: 30 TABLET | Refills: 0 | Status: SHIPPED | OUTPATIENT
Start: 2019-01-01 | End: 2019-01-01

## 2019-01-01 RX ORDER — LOSARTAN POTASSIUM 50 MG/1
50 TABLET ORAL
Qty: 30 TABLET | Refills: 2 | Status: SHIPPED | OUTPATIENT
Start: 2019-01-01 | End: 2019-01-01 | Stop reason: SDUPTHER

## 2019-01-01 RX ORDER — SODIUM CHLORIDE 0.9 % (FLUSH) 0.9 %
10 SYRINGE (ML) INJECTION AS NEEDED
Status: DISCONTINUED | OUTPATIENT
Start: 2019-01-01 | End: 2019-01-01 | Stop reason: HOSPADM

## 2019-01-01 RX ORDER — RIVASTIGMINE 4.6 MG/24H
1 PATCH, EXTENDED RELEASE TRANSDERMAL DAILY
Status: DISCONTINUED | OUTPATIENT
Start: 2019-01-01 | End: 2019-01-01 | Stop reason: HOSPADM

## 2019-01-01 RX ORDER — ASPIRIN 81 MG/1
81 TABLET, CHEWABLE ORAL DAILY
Qty: 90 TABLET | Refills: 3 | Status: SHIPPED | OUTPATIENT
Start: 2019-01-01

## 2019-01-01 RX ORDER — LANOLIN ALCOHOL/MO/W.PET/CERES
1000 CREAM (GRAM) TOPICAL EVERY OTHER DAY
Status: DISCONTINUED | OUTPATIENT
Start: 2019-01-01 | End: 2019-01-01 | Stop reason: HOSPADM

## 2019-01-01 RX ORDER — CARVEDILOL 3.12 MG/1
3.12 TABLET ORAL EVERY 12 HOURS SCHEDULED
Qty: 60 TABLET | Refills: 2 | Status: SHIPPED | OUTPATIENT
Start: 2019-01-01 | End: 2019-01-01 | Stop reason: SDUPTHER

## 2019-01-01 RX ORDER — ATORVASTATIN CALCIUM 20 MG/1
20 TABLET, FILM COATED ORAL NIGHTLY
Status: DISCONTINUED | OUTPATIENT
Start: 2019-01-01 | End: 2019-01-01 | Stop reason: HOSPADM

## 2019-01-01 RX ORDER — RIVASTIGMINE 4.6 MG/24H
1 PATCH, EXTENDED RELEASE TRANSDERMAL DAILY
COMMUNITY
Start: 2019-01-01

## 2019-01-01 RX ORDER — GLUCOSAMINE HCL 500 MG
3000 TABLET ORAL DAILY
COMMUNITY

## 2019-01-01 RX ORDER — FAMOTIDINE 20 MG/1
20 TABLET, FILM COATED ORAL DAILY
Status: DISCONTINUED | OUTPATIENT
Start: 2019-01-01 | End: 2019-01-01 | Stop reason: HOSPADM

## 2019-01-01 RX ORDER — MEMANTINE HYDROCHLORIDE 10 MG/1
10 TABLET ORAL DAILY
COMMUNITY

## 2019-01-01 RX ORDER — POTASSIUM CHLORIDE 1.5 G/1.77G
40 POWDER, FOR SOLUTION ORAL AS NEEDED
Status: DISCONTINUED | OUTPATIENT
Start: 2019-01-01 | End: 2019-01-01 | Stop reason: HOSPADM

## 2019-01-01 RX ORDER — LOSARTAN POTASSIUM 50 MG/1
50 TABLET ORAL DAILY
Qty: 90 TABLET | Refills: 3 | Status: SHIPPED | OUTPATIENT
Start: 2019-01-01 | End: 2020-01-01 | Stop reason: HOSPADM

## 2019-01-01 RX ORDER — RIVASTIGMINE 4.6 MG/24H
PATCH, EXTENDED RELEASE TRANSDERMAL
Qty: 30 PATCH | Refills: 3 | Status: SHIPPED | OUTPATIENT
Start: 2019-01-01 | End: 2019-01-01

## 2019-01-01 RX ORDER — ASPIRIN 300 MG/1
300 SUPPOSITORY RECTAL DAILY
Status: DISCONTINUED | OUTPATIENT
Start: 2019-01-01 | End: 2019-01-01

## 2019-01-01 RX ORDER — RIVASTIGMINE 4.6 MG/24H
1 PATCH, EXTENDED RELEASE TRANSDERMAL NIGHTLY
Status: DISCONTINUED | OUTPATIENT
Start: 2019-01-01 | End: 2019-01-01 | Stop reason: HOSPADM

## 2019-01-01 RX ORDER — ATORVASTATIN CALCIUM 80 MG/1
80 TABLET, FILM COATED ORAL NIGHTLY
Start: 2019-01-01 | End: 2019-01-01 | Stop reason: SDUPTHER

## 2019-01-01 RX ORDER — FUROSEMIDE 20 MG/1
20 TABLET ORAL 2 TIMES DAILY
Qty: 180 TABLET | Refills: 1 | Status: SHIPPED | OUTPATIENT
Start: 2019-01-01 | End: 2019-01-01

## 2019-01-01 RX ADMIN — FAMOTIDINE 20 MG: 20 TABLET ORAL at 21:16

## 2019-01-01 RX ADMIN — RIVASTIGMINE TRANSDERMAL SYSTEM 1 PATCH: 4.6 PATCH, EXTENDED RELEASE TRANSDERMAL at 22:46

## 2019-01-01 RX ADMIN — APIXABAN 5 MG: 5 TABLET, FILM COATED ORAL at 09:05

## 2019-01-01 RX ADMIN — FAMOTIDINE 20 MG: 20 TABLET ORAL at 22:35

## 2019-01-01 RX ADMIN — DOCUSATE SODIUM 100 MG: 100 CAPSULE, LIQUID FILLED ORAL at 21:16

## 2019-01-01 RX ADMIN — LOSARTAN POTASSIUM 50 MG: 50 TABLET ORAL at 08:55

## 2019-01-01 RX ADMIN — RIVASTIGMINE TRANSDERMAL SYSTEM 1 PATCH: 4.6 PATCH, EXTENDED RELEASE TRANSDERMAL at 20:18

## 2019-01-01 RX ADMIN — APIXABAN 5 MG: 5 TABLET, FILM COATED ORAL at 08:47

## 2019-01-01 RX ADMIN — ASPIRIN 81 MG CHEWABLE TABLET 81 MG: 81 TABLET CHEWABLE at 09:05

## 2019-01-01 RX ADMIN — ASPIRIN 81 MG CHEWABLE TABLET 81 MG: 81 TABLET CHEWABLE at 08:47

## 2019-01-01 RX ADMIN — CARVEDILOL 6.25 MG: 6.25 TABLET, FILM COATED ORAL at 08:36

## 2019-01-01 RX ADMIN — CARVEDILOL 3.12 MG: 3.12 TABLET, FILM COATED ORAL at 22:20

## 2019-01-01 RX ADMIN — CARVEDILOL 6.25 MG: 6.25 TABLET, FILM COATED ORAL at 09:41

## 2019-01-01 RX ADMIN — DOCUSATE SODIUM 100 MG: 100 CAPSULE, LIQUID FILLED ORAL at 09:40

## 2019-01-01 RX ADMIN — APIXABAN 5 MG: 5 TABLET, FILM COATED ORAL at 09:07

## 2019-01-01 RX ADMIN — APIXABAN 5 MG: 5 TABLET, FILM COATED ORAL at 08:33

## 2019-01-01 RX ADMIN — CARVEDILOL 6.25 MG: 6.25 TABLET, FILM COATED ORAL at 22:35

## 2019-01-01 RX ADMIN — SODIUM CHLORIDE, PRESERVATIVE FREE 3 ML: 5 INJECTION INTRAVENOUS at 08:23

## 2019-01-01 RX ADMIN — APIXABAN 5 MG: 5 TABLET, FILM COATED ORAL at 08:22

## 2019-01-01 RX ADMIN — FOLIC ACID 1 MG: 1 TABLET ORAL at 09:41

## 2019-01-01 RX ADMIN — ATORVASTATIN CALCIUM 80 MG: 40 TABLET, FILM COATED ORAL at 20:18

## 2019-01-01 RX ADMIN — CYANOCOBALAMIN TAB 1000 MCG 1000 MCG: 1000 TAB at 08:33

## 2019-01-01 RX ADMIN — MEMANTINE 10 MG: 10 TABLET ORAL at 09:07

## 2019-01-01 RX ADMIN — DOCUSATE SODIUM 100 MG: 100 CAPSULE, LIQUID FILLED ORAL at 08:21

## 2019-01-01 RX ADMIN — APIXABAN 5 MG: 5 TABLET, FILM COATED ORAL at 22:20

## 2019-01-01 RX ADMIN — SODIUM CHLORIDE, PRESERVATIVE FREE 3 ML: 5 INJECTION INTRAVENOUS at 08:48

## 2019-01-01 RX ADMIN — CARVEDILOL 6.25 MG: 6.25 TABLET, FILM COATED ORAL at 22:40

## 2019-01-01 RX ADMIN — SODIUM CHLORIDE, PRESERVATIVE FREE 3 ML: 5 INJECTION INTRAVENOUS at 09:43

## 2019-01-01 RX ADMIN — CARVEDILOL 6.25 MG: 6.25 TABLET, FILM COATED ORAL at 21:16

## 2019-01-01 RX ADMIN — SODIUM CHLORIDE 100 ML/HR: 9 INJECTION, SOLUTION INTRAVENOUS at 16:58

## 2019-01-01 RX ADMIN — MELATONIN TAB 5 MG 5 MG: 5 TAB at 23:43

## 2019-01-01 RX ADMIN — SODIUM CHLORIDE, PRESERVATIVE FREE 3 ML: 5 INJECTION INTRAVENOUS at 21:20

## 2019-01-01 RX ADMIN — ATORVASTATIN CALCIUM 20 MG: 20 TABLET, FILM COATED ORAL at 22:20

## 2019-01-01 RX ADMIN — BARIUM SULFATE 50 ML: 400 SUSPENSION ORAL at 13:55

## 2019-01-01 RX ADMIN — ASPIRIN 81 MG CHEWABLE TABLET 81 MG: 81 TABLET CHEWABLE at 09:41

## 2019-01-01 RX ADMIN — CYANOCOBALAMIN TAB 1000 MCG 1000 MCG: 1000 TAB at 09:41

## 2019-01-01 RX ADMIN — APIXABAN 5 MG: 5 TABLET, FILM COATED ORAL at 21:19

## 2019-01-01 RX ADMIN — SODIUM CHLORIDE, PRESERVATIVE FREE 3 ML: 5 INJECTION INTRAVENOUS at 20:21

## 2019-01-01 RX ADMIN — RIVASTIGMINE TRANSDERMAL SYSTEM 1 PATCH: 4.6 PATCH, EXTENDED RELEASE TRANSDERMAL at 08:16

## 2019-01-01 RX ADMIN — APIXABAN 5 MG: 5 TABLET, FILM COATED ORAL at 22:35

## 2019-01-01 RX ADMIN — SODIUM CHLORIDE, PRESERVATIVE FREE 3 ML: 5 INJECTION INTRAVENOUS at 21:07

## 2019-01-01 RX ADMIN — APIXABAN 5 MG: 5 TABLET, FILM COATED ORAL at 22:44

## 2019-01-01 RX ADMIN — CYANOCOBALAMIN TAB 1000 MCG 1000 MCG: 1000 TAB at 08:47

## 2019-01-01 RX ADMIN — FOLIC ACID 1 MG: 1 TABLET ORAL at 08:47

## 2019-01-01 RX ADMIN — ASPIRIN 81 MG CHEWABLE TABLET 81 MG: 81 TABLET CHEWABLE at 08:52

## 2019-01-01 RX ADMIN — CARVEDILOL 6.25 MG: 6.25 TABLET, FILM COATED ORAL at 08:21

## 2019-01-01 RX ADMIN — RIVASTIGMINE TRANSDERMAL SYSTEM 1 PATCH: 4.6 PATCH, EXTENDED RELEASE TRANSDERMAL at 21:20

## 2019-01-01 RX ADMIN — LOSARTAN POTASSIUM 50 MG: 50 TABLET ORAL at 13:35

## 2019-01-01 RX ADMIN — LOSARTAN POTASSIUM 50 MG: 50 TABLET ORAL at 08:22

## 2019-01-01 RX ADMIN — LOSARTAN POTASSIUM 50 MG: 50 TABLET ORAL at 08:33

## 2019-01-01 RX ADMIN — SODIUM CHLORIDE, PRESERVATIVE FREE 3 ML: 5 INJECTION INTRAVENOUS at 21:16

## 2019-01-01 RX ADMIN — FOLIC ACID 1 MG: 1 TABLET ORAL at 09:05

## 2019-01-01 RX ADMIN — APIXABAN 5 MG: 5 TABLET, FILM COATED ORAL at 08:55

## 2019-01-01 RX ADMIN — CARVEDILOL 6.25 MG: 6.25 TABLET, FILM COATED ORAL at 09:05

## 2019-01-01 RX ADMIN — FAMOTIDINE 20 MG: 20 TABLET ORAL at 08:55

## 2019-01-01 RX ADMIN — APIXABAN 5 MG: 5 TABLET, FILM COATED ORAL at 08:16

## 2019-01-01 RX ADMIN — FOLIC ACID 1 MG: 1 TABLET ORAL at 08:33

## 2019-01-01 RX ADMIN — MEMANTINE 10 MG: 10 TABLET ORAL at 08:34

## 2019-01-01 RX ADMIN — MEMANTINE 10 MG: 10 TABLET ORAL at 08:52

## 2019-01-01 RX ADMIN — SODIUM CHLORIDE, PRESERVATIVE FREE 3 ML: 5 INJECTION INTRAVENOUS at 09:07

## 2019-01-01 RX ADMIN — ATORVASTATIN CALCIUM 80 MG: 40 TABLET, FILM COATED ORAL at 21:16

## 2019-01-01 RX ADMIN — SODIUM CHLORIDE, PRESERVATIVE FREE 3 ML: 5 INJECTION INTRAVENOUS at 08:55

## 2019-01-01 RX ADMIN — FOLIC ACID 1 MG: 1 TABLET ORAL at 08:21

## 2019-01-01 RX ADMIN — APIXABAN 2.5 MG: 2.5 TABLET, FILM COATED ORAL at 21:28

## 2019-01-01 RX ADMIN — SODIUM CHLORIDE, PRESERVATIVE FREE 3 ML: 5 INJECTION INTRAVENOUS at 09:00

## 2019-01-01 RX ADMIN — CARVEDILOL 6.25 MG: 6.25 TABLET, FILM COATED ORAL at 08:47

## 2019-01-01 RX ADMIN — BARIUM SULFATE 100 ML: 0.81 POWDER, FOR SUSPENSION ORAL at 13:55

## 2019-01-01 RX ADMIN — ATORVASTATIN CALCIUM 80 MG: 40 TABLET, FILM COATED ORAL at 21:07

## 2019-01-01 RX ADMIN — ATORVASTATIN CALCIUM 80 MG: 40 TABLET, FILM COATED ORAL at 22:40

## 2019-01-01 RX ADMIN — LOSARTAN POTASSIUM 50 MG: 50 TABLET ORAL at 09:41

## 2019-01-01 RX ADMIN — BARIUM SULFATE 20 ML: 400 PASTE ORAL at 13:55

## 2019-01-01 RX ADMIN — FAMOTIDINE 20 MG: 20 TABLET ORAL at 08:16

## 2019-01-01 RX ADMIN — CARVEDILOL 3.12 MG: 3.12 TABLET, FILM COATED ORAL at 08:55

## 2019-01-01 RX ADMIN — FOLIC ACID 1 MG: 1 TABLET ORAL at 09:07

## 2019-01-01 RX ADMIN — RIVASTIGMINE TRANSDERMAL SYSTEM 1 PATCH: 4.6 PATCH, EXTENDED RELEASE TRANSDERMAL at 22:44

## 2019-01-01 RX ADMIN — SODIUM CHLORIDE, PRESERVATIVE FREE 3 ML: 5 INJECTION INTRAVENOUS at 22:41

## 2019-01-01 RX ADMIN — APIXABAN 5 MG: 5 TABLET, FILM COATED ORAL at 21:07

## 2019-01-01 RX ADMIN — FAMOTIDINE 20 MG: 20 TABLET ORAL at 21:07

## 2019-01-01 RX ADMIN — FAMOTIDINE 20 MG: 20 TABLET ORAL at 21:20

## 2019-01-01 RX ADMIN — CARVEDILOL 6.25 MG: 6.25 TABLET, FILM COATED ORAL at 21:28

## 2019-01-01 RX ADMIN — CARVEDILOL 6.25 MG: 6.25 TABLET, FILM COATED ORAL at 21:07

## 2019-01-01 RX ADMIN — RIVASTIGMINE TRANSDERMAL SYSTEM 1 PATCH: 4.6 PATCH, EXTENDED RELEASE TRANSDERMAL at 21:28

## 2019-01-01 RX ADMIN — ASPIRIN 81 MG CHEWABLE TABLET 81 MG: 81 TABLET CHEWABLE at 08:22

## 2019-01-01 RX ADMIN — GADOBENATE DIMEGLUMINE 15 ML: 529 INJECTION, SOLUTION INTRAVENOUS at 14:46

## 2019-01-01 RX ADMIN — ATORVASTATIN CALCIUM 80 MG: 40 TABLET, FILM COATED ORAL at 22:35

## 2019-01-01 RX ADMIN — FOLIC ACID 1 MG: 1 TABLET ORAL at 08:52

## 2019-01-01 RX ADMIN — ASPIRIN 81 MG CHEWABLE TABLET 81 MG: 81 TABLET CHEWABLE at 09:07

## 2019-01-01 RX ADMIN — DOCUSATE SODIUM 100 MG: 50 LIQUID ORAL at 12:46

## 2019-01-01 RX ADMIN — FAMOTIDINE 20 MG: 20 TABLET ORAL at 21:28

## 2019-01-01 RX ADMIN — BARIUM SULFATE 50 ML: 400 SUSPENSION ORAL at 14:45

## 2019-01-01 RX ADMIN — MEMANTINE 10 MG: 10 TABLET ORAL at 08:47

## 2019-01-01 RX ADMIN — CARVEDILOL 6.25 MG: 6.25 TABLET, FILM COATED ORAL at 20:18

## 2019-01-01 RX ADMIN — CARVEDILOL 6.25 MG: 6.25 TABLET, FILM COATED ORAL at 09:07

## 2019-01-01 RX ADMIN — POTASSIUM CHLORIDE 40 MEQ: 1.5 POWDER, FOR SOLUTION ORAL at 21:19

## 2019-01-01 RX ADMIN — FAMOTIDINE 20 MG: 20 TABLET ORAL at 22:41

## 2019-01-01 RX ADMIN — APIXABAN 5 MG: 5 TABLET, FILM COATED ORAL at 09:41

## 2019-01-01 RX ADMIN — MEMANTINE 10 MG: 10 TABLET ORAL at 08:55

## 2019-01-01 RX ADMIN — SODIUM CHLORIDE, PRESERVATIVE FREE 3 ML: 5 INJECTION INTRAVENOUS at 21:27

## 2019-01-01 RX ADMIN — ATORVASTATIN CALCIUM 80 MG: 40 TABLET, FILM COATED ORAL at 21:28

## 2019-01-01 RX ADMIN — ASPIRIN 81 MG CHEWABLE TABLET 324 MG: 81 TABLET CHEWABLE at 16:55

## 2019-01-01 RX ADMIN — CARVEDILOL 6.25 MG: 6.25 TABLET, FILM COATED ORAL at 21:19

## 2019-01-01 RX ADMIN — BARIUM SULFATE 20 ML: 400 PASTE ORAL at 14:45

## 2019-01-01 RX ADMIN — IOPAMIDOL 150 ML: 755 INJECTION, SOLUTION INTRAVENOUS at 12:22

## 2019-01-01 RX ADMIN — RIVASTIGMINE TRANSDERMAL SYSTEM 1 PATCH: 4.6 PATCH, EXTENDED RELEASE TRANSDERMAL at 08:56

## 2019-01-01 RX ADMIN — RIVASTIGMINE TRANSDERMAL SYSTEM 1 PATCH: 4.6 PATCH, EXTENDED RELEASE TRANSDERMAL at 22:42

## 2019-01-01 RX ADMIN — MEMANTINE 10 MG: 10 TABLET ORAL at 08:21

## 2019-01-01 RX ADMIN — APIXABAN 5 MG: 5 TABLET, FILM COATED ORAL at 21:16

## 2019-01-01 RX ADMIN — RIVASTIGMINE TRANSDERMAL SYSTEM 1 PATCH: 4.6 PATCH, EXTENDED RELEASE TRANSDERMAL at 21:55

## 2019-01-01 RX ADMIN — ASPIRIN 81 MG CHEWABLE TABLET 81 MG: 81 TABLET CHEWABLE at 08:33

## 2019-01-01 RX ADMIN — SODIUM CHLORIDE, PRESERVATIVE FREE 3 ML: 5 INJECTION INTRAVENOUS at 22:21

## 2019-01-01 RX ADMIN — CYANOCOBALAMIN TAB 1000 MCG 1000 MCG: 1000 TAB at 08:52

## 2019-01-01 RX ADMIN — SULFUR HEXAFLUORIDE 3 ML: KIT at 10:06

## 2019-01-01 RX ADMIN — CARVEDILOL 6.25 MG: 6.25 TABLET, FILM COATED ORAL at 08:52

## 2019-01-01 RX ADMIN — LOSARTAN POTASSIUM 50 MG: 50 TABLET ORAL at 09:05

## 2019-01-01 RX ADMIN — MEMANTINE 10 MG: 10 TABLET ORAL at 09:40

## 2019-01-01 RX ADMIN — ATORVASTATIN CALCIUM 80 MG: 40 TABLET, FILM COATED ORAL at 21:19

## 2019-01-01 RX ADMIN — APIXABAN 5 MG: 5 TABLET, FILM COATED ORAL at 20:18

## 2019-01-01 RX ADMIN — APIXABAN 2.5 MG: 2.5 TABLET, FILM COATED ORAL at 08:52

## 2019-01-01 RX ADMIN — FAMOTIDINE 20 MG: 20 TABLET ORAL at 20:18

## 2019-01-01 RX ADMIN — POTASSIUM CHLORIDE 40 MEQ: 1.5 POWDER, FOR SOLUTION ORAL at 12:40

## 2019-01-01 RX ADMIN — LOSARTAN POTASSIUM 50 MG: 50 TABLET ORAL at 22:20

## 2019-01-01 RX ADMIN — SULFUR HEXAFLUORIDE 3 ML: KIT at 09:00

## 2019-01-01 RX ADMIN — MEMANTINE 10 MG: 10 TABLET ORAL at 09:05

## 2019-01-01 RX ADMIN — MEMANTINE 10 MG: 10 TABLET ORAL at 08:16

## 2019-01-01 RX ADMIN — BARIUM SULFATE 100 ML: 0.81 POWDER, FOR SUSPENSION ORAL at 14:45

## 2019-01-01 RX ADMIN — SODIUM CHLORIDE, PRESERVATIVE FREE 3 ML: 5 INJECTION INTRAVENOUS at 08:34

## 2019-01-01 RX ADMIN — SODIUM CHLORIDE, PRESERVATIVE FREE 3 ML: 5 INJECTION INTRAVENOUS at 22:44

## 2019-01-11 RX ORDER — RIVASTIGMINE 4.6 MG/24H
PATCH, EXTENDED RELEASE TRANSDERMAL
Qty: 30 PATCH | Refills: 3 | Status: SHIPPED | OUTPATIENT
Start: 2019-01-11 | End: 2019-01-01 | Stop reason: SDUPTHER

## 2019-02-19 PROBLEM — G47.10 HYPERSOMNOLENCE: Status: ACTIVE | Noted: 2019-01-01

## 2019-02-19 PROBLEM — R53.1 WEAKNESS: Status: ACTIVE | Noted: 2019-01-01

## 2019-02-19 PROBLEM — R27.0 ATAXIA: Status: ACTIVE | Noted: 2019-01-01

## 2019-03-21 NOTE — TELEPHONE ENCOUNTER
----- Message from Rafi Crum MD sent at 3/20/2019 10:02 PM EDT -----  Regarding: EMG  Moderate neuropathy keep follow up.  Thanks!  ----- Message -----  From: Fritz Felton MD  Sent: 3/20/2019   1:01 PM  To: Rafi Crum MD

## 2019-03-21 NOTE — TELEPHONE ENCOUNTER
----- Message from Rafi Crum MD sent at 3/21/2019 11:14 AM EDT -----  Regarding: MRI brain  Ageing changes, old strokes, keep follow up, thanks.  ----- Message -----  From: Interface, Rad Results [place]  Sent: 3/20/2019  10:53 PM  To: Rafi Crum MD

## 2019-04-08 NOTE — TELEPHONE ENCOUNTER
----- Message from Rafi Crum MD sent at 4/5/2019  3:11 PM EDT -----  Regarding: EMG  Moderate neuropathy, keep follow up, thanks.  ----- Message -----  From: Fritz Felton MD  Sent: 3/20/2019   1:01 PM  To: Rafi Crum MD

## 2019-04-17 PROBLEM — F02.80 LATE ONSET ALZHEIMER'S DISEASE WITHOUT BEHAVIORAL DISTURBANCE (HCC): Status: ACTIVE | Noted: 2019-01-01

## 2019-04-17 PROBLEM — G62.9 PERIPHERAL NEUROPATHY: Status: ACTIVE | Noted: 2019-01-01

## 2019-04-17 PROBLEM — G30.1 LATE ONSET ALZHEIMER'S DISEASE WITHOUT BEHAVIORAL DISTURBANCE (HCC): Status: ACTIVE | Noted: 2019-01-01

## 2019-04-17 PROBLEM — M50.30 DDD (DEGENERATIVE DISC DISEASE), CERVICAL: Status: ACTIVE | Noted: 2019-01-01

## 2019-04-17 PROBLEM — I67.89 CEREBRAL MICROVASCULAR DISEASE: Status: ACTIVE | Noted: 2019-01-01

## 2019-04-17 NOTE — PROGRESS NOTES
Subjective:     Patient ID: Gibson Mansfield is a 78 y.o. male.    CC:   Chief Complaint   Patient presents with   • Extremity Weakness       HPI:   History of Present Illness     This is a pleasant 78-year-old male who presents for 6-week follow-up on generalized weakness, ataxia, recurrent falls and worsening symptoms over the past year.  He is accompanied by his wife during today's visit.  He is very hard of hearing and does have Alzheimer's dementia and is currently taking Namenda as well as Exelon patch and this is managed by his PCP.  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  He also had an MRI of the cervical spine without contrast completed on 3/20/2019 and this does show borderline C4-C5 canal stenosis due to central disc bulge, there are several levels of foraminal narrowing which is mild from C4-C5, C5-C6 and C3-C4.  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  He also had blood work with RPR being normal, folate 9 normal, methylmalonic acid 324 normal, free T4 1.16 normal, TSH 1.225 normal, vitamin B12 on the left side of normal at 428, CMP essentially within normal limits with a glucose of 110 nonfasting, CK normal at 74, CBC with differential did show some minimally low platelets at 149 with 150-450 being the normal range.  He and his wife tell me that he has not had any recent falls but did lower himself to the floor a few weeks ago.  He did have an episode several years back of left facial numbness and his wife is wondering if this was when he had his stroke.  He has never been diagnosed  with a stroke but these do run in his family and he is currently on Eliquis as well as pravastatin with a history of hyperlipidemia as well as atrial fibrillation and followed by cardiology regularly.  He does not have any pain or discomfort in his legs with the neuropathy.  He is using a walker at all times.  His wife would like a new order for physical therapy.  He also previously per Dr. Crum's notes had an MRI of the lumbar spine in July 2018 which showed multilevel degenerative disc changes as well as some mild stenosis.    The following portions of the patient's history were reviewed and updated as appropriate: allergies, current medications, past family history, past medical history, past social history, past surgical history and problem list.    Past Medical History:   Diagnosis Date   • Abdominal hernia    • Atrial fibrillation (CMS/HCC)    • Blindness    • Chronic anticoagulation    • COPD (chronic obstructive pulmonary disease) (CMS/HCC)    • Dementia    • GERD (gastroesophageal reflux disease)    • History of prostate cancer    • History of stroke    • HLD (hyperlipidemia)    • [place] (hard of hearing)    • HTN (hypertension)    • Legally blind    • Macular degeneration of both eyes    • Osteoarthritis of lumbar spine        Past Surgical History:   Procedure Laterality Date   • CATARACT EXTRACTION Bilateral 2016   • COLONOSCOPY  2013   • LUMBAR DISC SURGERY  2000    Levels unknown, Dr. Decker   • LUMBAR DISCECTOMY  2005    Levels unknown, Dr. Decker   • LUMBAR FUSION  1990    Levels unknown, Dr. Decker   • PROSTATE FIDUCIAL MARKER PLACEMENT  2016       Social History     Socioeconomic History   • Marital status:      Spouse name: Lynette   • Number of children: 2   • Years of education: H.S.   • Highest education level: Not on file   Occupational History   • Occupation:      Employer: RETIRED   Tobacco Use   • Smoking status: Former Smoker     Packs/day: 2.00     Years: 15.00      Pack years: 30.00     Last attempt to quit:      Years since quittin.3   • Smokeless tobacco: Never Used   Substance and Sexual Activity   • Alcohol use: No   • Drug use: No   • Sexual activity: Not Currently     Partners: Female       Family History   Problem Relation Age of Onset   • Diabetes Mother    • Breast cancer Mother    • Heart disease Father         Review of Systems   Constitutional: Negative for chills, fatigue, fever and unexpected weight change.   HENT: Negative for ear pain, hearing loss, nosebleeds, rhinorrhea and sore throat.    Eyes: Negative for photophobia, pain, discharge, itching and visual disturbance.   Respiratory: Negative for cough, chest tightness, shortness of breath and wheezing.    Cardiovascular: Negative for chest pain, palpitations and leg swelling.   Gastrointestinal: Negative for abdominal pain, blood in stool, constipation, diarrhea, nausea and vomiting.   Genitourinary: Negative for dysuria, frequency, hematuria and urgency.   Musculoskeletal: Negative for arthralgias, back pain, gait problem, joint swelling, myalgias, neck pain and neck stiffness.   Skin: Negative for rash and wound.   Allergic/Immunologic: Negative for environmental allergies and food allergies.   Neurological: Positive for weakness. Negative for dizziness, tremors, seizures, syncope, speech difficulty, light-headedness, numbness and headaches.   Hematological: Negative for adenopathy. Does not bruise/bleed easily.   Psychiatric/Behavioral: Negative for agitation, confusion, decreased concentration, hallucinations, sleep disturbance and suicidal ideas. The patient is not nervous/anxious.         Objective:    Neurologic Exam     Mental Status   Oriented to person.   Disoriented to place.   Disoriented to time.   Attention: decreased. Concentration: decreased.   Speech: speech is normal   Level of consciousness: alert    Very [place]     Cranial Nerves     CN II   Visual acuity: decreased (macular  degeneration severe with no central vision)    CN III, IV, VI   Pupils are equal, round, and reactive to light.  Extraocular motions are normal.     CN V   Facial sensation intact.     CN VII   Facial expression full, symmetric.     CN VIII   Hearing: impaired (very [place] with hearing aids)    CN IX, X   CN IX normal.   CN X normal.     CN XI   CN XI normal.     CN XII   CN XII normal.     Motor Exam   Muscle bulk: normal  Overall muscle tone: normal    Strength   Strength 5/5 except as noted.   Weakness of BLE and difficult with rising from chair.     Sensory Exam   Right leg light touch: decreased from ankle  Left leg light touch: decreased from ankle  Right leg vibration: decreased from ankle  Left leg vibration: decreased from ankle  Right leg pinprick: decreased from ankle  Left leg pinprick: decreased from ankle    Gait, Coordination, and Reflexes     Gait  Gait: wide-based (stooped, slow, unsteady gait but steady with walker)    Coordination   Finger to nose coordination: normal    Tremor   Resting tremor: absent  Intention tremor: absent  Action tremor: absent    Reflexes   Right brachioradialis: 2+  Left brachioradialis: 2+  Right biceps: 2+  Left biceps: 2+  Right triceps: 2+  Left triceps: 2+  Right patellar: 1+  Left patellar: 1+  Right achilles: 1+  Left achilles: 1+  Right : 2+  Left : 2+      Physical Exam   Eyes: EOM are normal. Pupils are equal, round, and reactive to light.   Neurological: He has a normal Finger-Nose-Finger Test.   Reflex Scores:       Tricep reflexes are 2+ on the right side and 2+ on the left side.       Bicep reflexes are 2+ on the right side and 2+ on the left side.       Brachioradialis reflexes are 2+ on the right side and 2+ on the left side.       Patellar reflexes are 1+ on the right side and 1+ on the left side.       Achilles reflexes are 1+ on the right side and 1+ on the left side.  Psychiatric: He has a normal mood and affect. His speech is normal. Judgment and  thought content normal. He is slowed. Cognition and memory are impaired. He exhibits abnormal recent memory. He exhibits normal remote memory.       Assessment/Plan:       Gibson was seen today for extremity weakness.    Diagnoses and all orders for this visit:    Cerebral microvascular disease  Comments:  Confirmed on MRI brain. Old right basal ganglia stroke on MRI brain.  Orders:  -     folic acid (FOLVITE) 1 MG tablet; Take 1 tablet by mouth Daily.    Other polyneuropathy  Comments:  Confirmed EMG/NCVS BLE 2/19/19-may use B12 and Folic Acid. no pain currently.  Orders:  -     folic acid (FOLVITE) 1 MG tablet; Take 1 tablet by mouth Daily.  -     vitamin B-12 (CYANOCOBALAMIN) 1000 MCG tablet; Take 1 tablet by mouth Every Other Day.    Weakness  -     Ambulatory Referral to Physical Therapy Evaluate and treat    Ataxia  -     Ambulatory Referral to Physical Therapy Evaluate and treat    Late onset Alzheimer's disease without behavioral disturbance  Comments:  Continue Exelon patch and namenda per PCP    DDD (degenerative disc disease), cervical  Comments:  MRI C spine confirmed. PT.  Orders:  -     Ambulatory Referral to Physical Therapy Evaluate and treat         I reviewed the MRI of the brain as well as MRI of the cervical spine.  Have also reviewed the EMG and NCS results.  I recommended no specific treatment for the neuropathy as far as pain since he is not experiencing pain or discomfort.  I feel like he would benefit from but vitamin B12 as well as folic acid.  I also recommended physical therapy.  I recommend he continue his Exelon patch and Namenda per his primary care provider for Alzheimer's.  I would like to see him back in about 8 to 12 weeks for reevaluation of his gait and balance overall.  He seems to be improving slowly. Reviewed medications, potential side effects and signs and symptoms to report. Discussed risk versus benefits of treatment plan with patient and/or family-including medications,  labs and radiology that may be ordered. Addressed questions and concerns during visit. Patient and/or family verbalized understanding and agree with plan.    During this visit the following were done:  Labs Reviewed [x]    Labs Ordered []    Radiology Reports Reviewed [x]    Radiology Ordered []    PCP Records Reviewed []    Referring Provider Records Reviewed []    ER Records Reviewed []    Hospital Records Reviewed []    History Obtained From Family []    Radiology Images Reviewed [x]    Other Reviewed [x]    Records Requested []      EMR Dragon/Transcription Disclaimer:  Much of this encounter note is an electronic transcription of spoken language to printed text. Electronic transcription of spoken language may permit erroneous words or phrases to be inadvertently transcribed. Although I have reviewed the note for such errors, some may still exist in this documentation.      Anitha Haines, APRN  4/17/2019

## 2019-05-15 PROBLEM — E11.9 DM2 (DIABETES MELLITUS, TYPE 2) (HCC): Status: ACTIVE | Noted: 2019-01-01

## 2019-05-15 PROBLEM — R55 NEAR SYNCOPE: Status: ACTIVE | Noted: 2019-01-01
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
## 2019-05-15 PROBLEM — J44.9 COPD (CHRONIC OBSTRUCTIVE PULMONARY DISEASE) (HCC): Status: ACTIVE | Noted: 2019-01-01

## 2019-05-15 PROBLEM — R77.8 ELEVATED TROPONIN: Status: ACTIVE | Noted: 2019-01-01

## 2019-05-15 NOTE — THERAPY EVALUATION
Acute Care - Occupational Therapy Initial Evaluation  [facility]     Patient Name: Gibson Mansfield  : 1940  MRN: [id number]  Today's Date: 5/15/2019  Onset of Illness/Injury or Date of Surgery: 05/15/19  Date of Referral to OT: 05/15/19  Referring Physician: DO Tia     Admit Date: 5/15/2019       ICD-10-CM ICD-9-CM   1. Elevated troponin R74.8 790.6   2. Fall, initial encounter W19.XXXA E888.9   3. Impaired functional mobility, balance, gait, and endurance Z74.09 V49.89   4. Impaired mobility and ADLs Z74.09 799.89     Patient Active Problem List   Diagnosis   • Prostate cancer (CMS/HCC)   • PAF (paroxysmal atrial fibrillation) (CMS/[facility])   • Generalized weakness   • Ataxia   • Hypersomnolence   • Cerebral microvascular disease   • Late onset Alzheimer's disease without behavioral disturbance   • Peripheral neuropathy   • DDD (degenerative disc disease), cervical   • Elevated troponin   • Fall   • COPD (chronic obstructive pulmonary disease) (CMS/HCC)   • DM2 (diabetes mellitus, type 2) (CMS/[facility])   • Tympanic membrane perforation   • Sensorineural hearing loss of left ear   • Otitis externa   • Mixed hearing loss of right ear     Past Medical History:   Diagnosis Date   • Abdominal hernia    • Atrial fibrillation (CMS/HCC)    • Blindness    • Chronic anticoagulation    • COPD (chronic obstructive pulmonary disease) (CMS/HCC)    • Dementia    • GERD (gastroesophageal reflux disease)    • History of prostate cancer    • History of stroke    • HLD (hyperlipidemia)    • [place] (hard of hearing)    • HTN (hypertension)    • Legally blind    • Macular degeneration of both eyes    • Osteoarthritis of lumbar spine      Past Surgical History:   Procedure Laterality Date   • CATARACT EXTRACTION Bilateral    • COLONOSCOPY     • LUMBAR DISC SURGERY  2000    Levels unknown, Dr. Decker   • LUMBAR DISCECTOMY  2005    Levels unknown, Dr. Decker   • LUMBAR FUSION      Levels unknown, Dr. Decker   • PROSTATE  FIDUCIAL MARKER PLACEMENT  2016          OT ASSESSMENT FLOWSHEET (last 12 hours)      Occupational Therapy Evaluation     Row Name 05/15/19 1500                   OT Evaluation Time/Intention    Subjective Information  complains of;weakness  -HK        Document Type  evaluation  -HK        Mode of Treatment  individual therapy;occupational therapy  -HK        Patient Effort  excellent  -HK        Symptoms Noted During/After Treatment  none  -HK           General Information    Patient Profile Reviewed?  yes  -HK        Onset of Illness/Injury or Date of Surgery  05/15/19  -HK        Referring Physician  Tia, DO   -HK        Patient Observations  alert;cooperative;agree to therapy  -HK        Patient/Family Observations  Wife at bedside   -HK        General Observations of Patient  Pt received up in chair with IV heplocked.   -HK        Prior Level of Function  independent:;gait;all household mobility;transfer;bed mobility;min assist:;ADL's  -HK        Equipment Currently Used at Home  rollator  -HK        Pertinent History of Current Functional Problem  Pt is a 78 YOM who presents to [facility] with complaints of syncopal episode. Work up revealed troponin.   -HK        Existing Precautions/Restrictions  fall;other (see comments) Pt extremely kyphotic.   -HK        Risks Reviewed  patient:;spouse/S.O.:;LOB;nausea/vomiting;increased discomfort;change in vital signs;dizziness;increased drainage;lines disloged  -HK        Benefits Reviewed  patient:;spouse/S.O.:;improve function;increase independence;increase strength;decrease pain;increase balance;improve skin integrity;decrease risk of DVT;increase knowledge  -HK        Barriers to Rehab  previous functional deficit;medically complex  -HK           Relationship/Environment    Primary Source of Support/Comfort  spouse  -HK        Lives With  spouse  -HK           Resource/Environmental Concerns    Current Living Arrangements  home/apartment/condo  -HK           Home Main  Entrance    Number of Stairs, Main Entrance  one  -HK        Stair Railings, Main Entrance  none  -HK           Cognitive Assessment/Interventions    Additional Documentation  Cognitive Assessment/Intervention (Group)  -HK           Cognitive Assessment/Intervention- PT/OT    Affect/Mental Status (Cognitive)  WFL  -HK        Orientation Status (Cognition)  oriented to;person;situation  -HK        Follows Commands (Cognition)  follows one step commands;over 90% accuracy  -HK        Cognitive Function (Cognitive)  safety deficit  -HK        Safety Deficit (Cognitive)  mild deficit;safety precautions follow-through/compliance;safety precautions awareness;insight into deficits/self awareness;awareness of need for assistance  -HK        Personal Safety Interventions  fall prevention program maintained;gait belt;nonskid shoes/slippers when out of bed  -HK           Safety Issues, Functional Mobility    Safety Issues Affecting Function (Mobility)  safety precautions follow-through/compliance;safety precaution awareness;insight into deficits/self awareness;awareness of need for assistance;at risk behavior observed;judgment;problem solving;sequencing abilities  -HK        Impairments Affecting Function (Mobility)  balance;motor control;motor planning;postural/trunk control;strength  -HK           Bed Mobility Assessment/Treatment    Comment (Bed Mobility)  Pt received and left UIC   -HK           Functional Mobility    Functional Mobility- Ind. Level  minimum assist (75% patient effort);verbal cues required  -HK        Functional Mobility- Device  rolling walker  -HK        Functional Mobility-Distance (Feet)  30  -HK        Functional Mobility- Safety Issues  step length decreased;weight-shifting ability decreased  -HK        Functional Mobility- Comment  Pt ambultes with Eddi and RW. Pt requires frequent cues for safe use of RW.   -HK           Transfer Assessment/Treatment    Transfer Assessment/Treatment  sit-stand  transfer;stand-sit transfer  -HK        Comment (Transfers)  Cues for safe hand placement and sequencing. OT issued hand out on tub transfer bench and raised toilet seat.   -HK           Sit-Stand Transfer    Sit-Stand [place] (Transfers)  contact guard;verbal cues  -HK        Assistive Device (Sit-Stand Transfers)  walker, front-wheeled  -HK           Stand-Sit Transfer    Stand-Sit [place] (Transfers)  contact guard;verbal cues  -HK        Assistive Device (Stand-Sit Transfers)  walker, front-wheeled  -HK           ADL Assessment/Intervention    BADL Assessment/Intervention  lower body dressing  -HK           Lower Body Dressing Assessment/Training    Lower Body Dressing [place] Level  doff;don;socks;contact guard assist  -HK        Lower Body Dressing Position  unsupported sitting  -HK        Comment (Lower Body Dressing)  Pt dof/don socks with CGA to maintain balance. Wife reports she generally assists with LBD.   -HK           BADL Safety/Performance    Impairments, BADL Safety/Performance  balance;strength;endurance/activity tolerance;motor control;motor planning  -HK           General ROM    RT Upper Ext  Rt Shoulder Flexion  -HK        LT Upper Ext  Lt Shoulder Flexion  -HK           Right Upper Ext    Rt Shoulder Flexion AROM  WNL  -HK           Left Upper Ext    Lt Shoulder Flexion AROM  WNL   -HK           MMT (Manual Muscle Testing)    Rt Upper Ext  Rt Shoulder Flexion  -HK        Lt Upper Ext  Lt Shoulder Flexion  -HK           MMT Right Upper Ext    Rt Shoulder Flexion MMT, Gross Movement  (4-/5) good minus  -HK           MMT Left Upper Ext    Lt Shoulder Flexion MMT, Gross Movement  (4-/5) good minus  -HK           Motor Assessment/Interventions    Additional Documentation  Balance (Group)  -HK           Balance    Balance  static sitting balance;static standing balance;dynamic sitting balance  -HK           Static Sitting Balance    Level of [place] (Unsupported Sitting, Static  Balance)  independent  -HK        Sitting Position (Unsupported Sitting, Static Balance)  sitting in chair  -HK        Time Able to Maintain Position (Unsupported Sitting, Static Balance)  2 to 3 minutes  -HK           Dynamic Sitting Balance    Level of [place], Reaches Outside Midline (Sitting, Dynamic Balance)  contact guard assist  -HK        Sitting Position, Reaches Outside Midline (Sitting, Dynamic Balance)  sitting in chair  -HK        Comment, Reaches Outside Midline (Sitting, Dynamic Balance)  completing LBD   -HK           Static Standing Balance    Level of [place] (Supported Standing, Static Balance)  contact guard assist  -HK        Time Able to Maintain Position (Supported Standing, Static Balance)  1 to 2 minutes  -HK        Assistive Device Utilized (Supported Standing, Static Balance)  walker, rolling  -HK           Sensory Assessment/Intervention    Sensory General Assessment  light touch sensation deficits identified  -HK           Light Touch Sensation Assessment    Comment, Left Upper Extremity: Light Touch Sensation Assessment  Pt reports intermittent numbess and tingling in B fingers   -HK        Comment, Right Upper Extremity: Light Touch Sensation Assessment  Pt reports intermittent numbess and tingling in B fingers   -HK           Positioning and Restraints    Pre-Treatment Position  sitting in chair/recliner  -HK        Post Treatment Position  chair  -HK        In Chair  notified nsg;reclined;call light within reach;encouraged to call for assist;exit alarm on;with family/caregiver  -HK           Pain Assessment    Additional Documentation  Pain Scale: Numbers Pre/Post-Treatment (Group)  -HK           Pain Scale: Numbers Pre/Post-Treatment    Pain Scale: Numbers, Pretreatment  0/10 - no pain  -HK        Pain Scale: Numbers, Post-Treatment  0/10 - no pain  -HK           Coping    Observed Emotional State  accepting;calm;cooperative  -HK           Plan of Care Review    Plan of  Care Reviewed With  spouse;patient  -HK           Clinical Impression (OT)    Date of Referral to OT  05/15/19  -HK        OT Diagnosis  Decreased independence with ADLs and Mobility  -HK        Patient/Family Goals Statement (OT Eval)  Pt would like to improve and return home.   -HK        Criteria for Skilled Therapeutic Interventions Met (OT Eval)  yes;treatment indicated  -HK        Rehab Potential (OT Eval)  good, to achieve stated therapy goals  -HK        Therapy Frequency (OT Eval)  daily  -HK        Care Plan Review (OT)  evaluation/treatment results reviewed;care plan/treatment goals reviewed;risks/benefits reviewed;patient/other agree to care plan  -HK        Care Plan Review, Other Participant (OT Eval)  spouse  -HK        Anticipated Equipment Needs at Discharge (OT)  front wheeled walker;raised toilet seat;tub bench  -HK        Anticipated Discharge Disposition (OT)  home with home health;home with assist  -HK           Vital Signs    Pre Systolic BP Rehab  122  -HK        Pre Treatment Diastolic BP  90  -HK        Pre Patient Position  Sitting  -HK        Intra Patient Position  Standing  -HK        Post Patient Position  Sitting  -HK           OT Goals    Bed Mobility Goal Selection (OT)  bed mobility, OT goal 1  -HK        Transfer Goal Selection (OT)  transfer, OT goal 1  -HK        Toileting Goal Selection (OT)  toileting, OT goal 1  -HK        Grooming Goal Selection (OT)  grooming, OT goal 1  -HK        Additional Documentation  Grooming Goal Selection (OT) (Row)  -HK           Bed Mobility Goal 1 (OT)    Activity/Assistive Device (Bed Mobility Goal 1, OT)  sit to supine/supine to sit;scooting  -HK        [place] Level/Cues Needed (Bed Mobility Goal 1, OT)  minimum assist (75% or more patient effort);verbal cues required  -HK        Time Frame (Bed Mobility Goal 1, OT)  5 days  -HK        Progress/Outcomes (Bed Mobility Goal 1, OT)  goal ongoing  -HK           Transfer Goal 1 (OT)     Activity/Assistive Device (Transfer Goal 1, OT)  sit-to-stand/stand-to-sit;toilet  -HK        [place] Level/Cues Needed (Transfer Goal 1, OT)  supervision required  -HK        Time Frame (Transfer Goal 1, OT)  5 days  -HK        Progress/Outcome (Transfer Goal 1, OT)  goal ongoing  -HK           Toileting Goal 1 (OT)    Activity/Device (Toileting Goal 1, OT)  toileting skills, all  -HK        [place] Level/Cues Needed (Toileting Goal 1, OT)  minimum assist (75% or more patient effort);verbal cues required  -HK        Time Frame (Toileting Goal 1, OT)  5 days  -HK        Progress/Outcome (Toileting Goal 1, OT)  goal ongoing  -HK           Grooming Goal 1 (OT)    Activity/Device (Grooming Goal 1, OT)  hair care;oral care;wash face, hands  -HK        [place] (Grooming Goal 1, OT)  minimum assist (75% or more patient effort);verbal cues required  -HK        Time Frame (Grooming Goal 1, OT)  5 days  -HK        Progress/Outcome (Grooming Goal 1, OT)  goal ongoing  -HK           Living Environment    Home Accessibility  stairs to enter home;tub/shower is not walk in  -HK          User Key  (r) = Recorded By, (t) = Taken By, (c) = Cosigned By    Initials Name Effective Dates    Yaritza Nicholson, OT 03/07/18 -          Occupational Therapy Education     Title: PT OT SLP Therapies (In Progress)     Topic: Occupational Therapy (In Progress)     Point: ADL training (Done)     Description: Instruct learner(s) on proper safety adaptation and remediation techniques during self care or transfers.   Instruct in proper use of assistive devices.    Learning Progress Summary           Patient Acceptance, E, VU by  at 5/15/2019  3:00 PM    Comment:  Pt educated on ADL retraining with LBD, safety precautions and appropriate body mechanics.                   Point: Precautions (Done)     Description: Instruct learner(s) on prescribed precautions during self-care and functional transfers.    Learning Progress Summary            Patient Acceptance, E, VU by  at 5/15/2019  3:00 PM    Comment:  Pt educated on ADL retraining with LBD, safety precautions and appropriate body mechanics.                   Point: Body mechanics (Done)     Description: Instruct learner(s) on proper positioning and spine alignment during self-care, functional mobility activities and/or exercises.    Learning Progress Summary           Patient Acceptance, E, VU by  at 5/15/2019  3:00 PM    Comment:  Pt educated on ADL retraining with LBD, safety precautions and appropriate body mechanics.                               User Key     Initials Effective Dates Name Provider Type Discipline     03/07/18 -  Yaritza Solo, OT Occupational Therapist OT                  OT Recommendation and Plan  Outcome Summary/Treatment Plan (OT)  Anticipated Equipment Needs at Discharge (OT): front wheeled walker, raised toilet seat, tub bench  Anticipated Discharge Disposition (OT): home with home health, home with assist  Therapy Frequency (OT Eval): daily  Plan of Care Review  Plan of Care Reviewed With: spouse, patient  Plan of Care Reviewed With: spouse, patient  Outcome Summary: OT eval complete. Pt completes  sit to stand with CGA and ambulates with Eddi and RW. Pt requires verbal cues for safety with ambulation and RW. Pt dof/don socks with CGA and wife reports she generally assists with all ADLS. Recommend tub bench and raised toilet seat. OT issued hand out. Anticipate D/C home with assist from wife and HH.     Outcome Measures     Row Name 05/15/19 1500 05/15/19 [id number]          How much help from another person do you currently need...    Turning from your back to your side while in flat bed without using bedrails?  --  3  -SC     Moving from lying on back to sitting on the side of a flat bed without bedrails?  --  2  -SC     Moving to and from a bed to a chair (including a wheelchair)?  --  3  -SC     Standing up from a chair using your arms (e.g., wheelchair, bedside  chair)?  --  3  -SC     Climbing 3-5 steps with a railing?  --  2  -SC     To walk in hospital room?  --  3  -SC     AM-PAC 6 Clicks Score  --  16  -SC        How much help from another is currently needed...    Putting on and taking off regular lower body clothing?  2  -HK  --     Bathing (including washing, rinsing, and drying)  3  -HK  --     Toileting (which includes using toilet bed pan or urinal)  3  -HK  --     Putting on and taking off regular upper body clothing  3  -HK  --     Taking care of personal grooming (such as brushing teeth)  3  -HK  --     Eating meals  3  -HK  --     Score  17  -HK  --        Functional Assessment    Outcome Measure Options  AM-PAC 6 Clicks Daily Activity (OT)  -  AM-PAC 6 Clicks Basic Mobility (PT)  -SC       User Key  (r) = Recorded By, (t) = Taken By, (c) = Cosigned By    Initials Name Provider Type    SC Johnnie Beasley, PT Physical Therapist     Yaritza Solo, OT Occupational Therapist          Time Calculation:   Time Calculation- OT     Row Name 05/15/19 1500             Time Calculation- OT    OT Start Time  1500  -      OT Received On  05/15/19  -      OT Goal Re-Cert Due Date  05/25/19  -        User Key  (r) = Recorded By, (t) = Taken By, (c) = Cosigned By    Initials Name Provider Type     Yaritza Solo, OT Occupational Therapist        Therapy Charges for Today     Code Description Service Date Service Provider Modifiers Qty    [id number]  OT EVAL LOW COMPLEXITY 4 5/15/2019 Yaritza Solo, OT GO 1    [id number]  OT THER SUPP EA 15 MIN 5/15/2019 Yaritza Solo, OT GO 1               Yaritza Solo OT  5/15/2019

## 2019-05-15 NOTE — CONSULTS
"[facility] at [facility]  CARDIOLOGY CONSULTATION NOTE    Gibson Mansfield  : 1940  MRN:[id number]    Date of Admission:5/15/2019  Date of Consultation: 05/15/19    PCP: Samson Richard MD    IDENTIFICATION: An 78 y.o. male resident of [place], [place]     CC: Fall and mildly elevated troponin    PROBLEM LIST:   1. Paroxysmal atrial fibrillation:  a. Symptoms of fatigue over the last month with EKG on 2015 revealing atrial fibrillation with controlled rate.  b. CHADS-VASc = 4.   c. Apixaban therapy instituted.  d. Echo 2015: EF 55-60%, moderate aortic calcification, mild AI  e. 24-hour Holter, A-fib rate  bpm, rate>120 (intermittent) 17 of 24 hours (2017)  f. All symptoms abated after discontinuation of beta blocker.  g. Echo 5/15/19: LA enlargement; severe apical hypokinesia with EF <35%, fibrocalcific aortic valve with mild-moderate \"mixed\" AS/AI, mild-mod MR  2. Hypertension.   3. Hyperlipidemia.   4. Diabetes mellitus type 2, diet controlled.   5. Osteoarthritis.   6. Prostate cancer:  a. “Watch and wait” by primary care.   7. History of PUD.   8. Dementia.  9. History of remote tobacco abuse  10. Peripheral neuropathy  11. Hard of hearing   12. Lower extremity weakness and falls    a. Negative NS evaluation  b. Negative Neurology evaluation 2019   c. ROXANN's 2018: Right =0.95; left is abnormal with significant inflow (aorto-iliac) and infrainguinal arterial occlusive disease   d. Fall, May 2019 with subsequent hospitalization       ALLERGIES:   Allergies   Allergen Reactions   • Penicillins    • Sulfa Antibiotics      HPI: Mr. Mansfield is a pleasant 77 y/o WM with history as noted above who is seen in consultation today for mildly elevated troponin. He has had progressive LE weakness with falls in the past year, now requiring the use of a walker for ambulation, and he has been evaluated by NS (Dr. Costello), Dr. Chowdhury, and most recently Dr. Romano " "with Neurology. No specific cause has been identified as of yet besides his moderate peripheral neuropathy. He unfortunately sustained another fall last night after getting up from the commode. He reports his right leg \"gave out\" with no syncope, pre-syncope or any pain prior to his fall. He was evaluated in [facility] and subsequently transferred to our hospital. CT head and pelvis were negative for acute process. His troponin was found to be mildly elevated at 0.2 at OSH, however has remained stable here at 0.094 and 0.098. His EKG does not show any acute ST changes. Patient denies any chest pain or discomfort, no significant exertional dyspnea, palpitations or syncope. He does state he has some dyspnea which will wake him up from sleep at times, however no lower extremity edema or PND.     ROS: All systems have been reviewed and are negative with the exception of those mentioned in the HPI and problem list above.    Surgical History:   Past Surgical History:   Procedure Laterality Date   • CATARACT EXTRACTION Bilateral    • COLONOSCOPY     • LUMBAR DISC SURGERY      Levels unknown, Dr. Decker   • LUMBAR DISCECTOMY      Levels unknown, Dr. Decker   • LUMBAR FUSION      Levels unknown, Dr. Decker   • PROSTATE FIDUCIAL MARKER PLACEMENT       Social History:   Social History     Socioeconomic History   • Marital status:    Occupational History   • Occupation:      Employer: RETIRED   Tobacco Use   • Smoking status: Former Smoker     Packs/day: 2.00     Years: 15.00     Pack years: 30.00     Last attempt to quit:      Years since quittin.3   • Smokeless tobacco: Never Used   Substance and Sexual Activity   • Alcohol use: No   • Drug use: No   • Sexual activity: Not Currently     Partners: Female       Family History:   Family History   Problem Relation Age of Onset   • Diabetes Mother    • Breast cancer Mother    • Heart disease Father        Objective " "    /97 (BP Location: Right arm, Patient Position: Lying)   Pulse 78   Temp 97.9 °F (36.6 °C) (Oral)   Resp 17   Wt 79 kg (174 lb 1 oz)   SpO2 94%   BMI 27.26 kg/m²     Intake/Output Summary (Last 24 hours) at 5/15/2019 0924  Last data filed at 5/15/2019 0152  Gross per 24 hour   Intake --   Output 100 ml   Net -100 ml       PHYSICAL EXAM:  CONSTITUTIONAL: Elderly, cooperative, in no acute distress  HEENT: Normocephalic, atraumatic, PERRLA, no JVD  CARDIOVASCULAR:  Irregular rhythm and normal rate, 3/6 systolic murmur, no gallop, rub.  RESPIRATORY: Clear to auscultation, normal respiratory effort, no wheezing, rales or rhonchi  GI: Soft, nontender, normal bowel sounds  MUSCULOSKELETAL: No gross deformities, no edema; no pedal pulses BUT no sx or threatened tissue loss (or any real findings) in LLE.  SKIN: Warm, dry. No bleeding, or rash. Ecchymosis bilateral upper extremities   NEUROLOGICAL: No focal deficits  PSYCHIATRIC: Normal mood and affect. Behavior is normal     Labs/Diagnostic Data  Results from last 7 days   Lab Units 05/15/19  0331   SODIUM mmol/L 144   POTASSIUM mmol/L 4.0   CHLORIDE mmol/L 108*   CO2 mmol/L 22.0   BUN mg/dL 16   CREATININE mg/dL 1.00   GLUCOSE mg/dL 110*   CALCIUM mg/dL 9.4     Results from last 7 days   Lab Units 05/15/19  0719 05/15/19  0331   TROPONIN T ng/mL 0.098* 0.094*     Results from last 7 days   Lab Units 05/15/19  0331   WBC 10*3/mm3 7.44   HEMOGLOBIN g/dL 11.8*   HEMATOCRIT % 37.4*   PLATELETS 10*3/mm3 112*     I personally reviewed the patient's EKG/Telemetry data    Radiology Data:   Echo 5/15/19:  Interpretation Summary     · Left atrial enlargement is seen.  · There is severe apical hypokinesia with an estimated ejection fraction of <35%.  · There are fibrocalcific changes in the aortic valve that are associated with mild-moderate \"mixed\" AS/AI.  · There is mitral annular calcification with mild-moderate mitral regurgitation.  · The estimated pulmonary artery " pressure is normal.     CT Head 5/15/19:  IMPRESSION:  No acute intracranial abnormality identified.     Generalized atrophy with diffuse chronic periventricular white matter change and old right basal ganglia lacunar infarct.     CT Pelvis 5/15/19:  IMPRESSION:  No acute fracture or deformity. Mild arthritic changes both hips.    Current Medications:    apixaban 5 mg Oral Q12H   atorvastatin 20 mg Oral Nightly   famotidine 20 mg Oral Daily   memantine 10 mg Oral Daily   rivastigmine 1 patch Transdermal Daily   sodium chloride 3 mL Intravenous Q12H          Assessment and Plan:     1. Elevated troponin  - no EKG changes and patient denies angina; DOES NOT meet 5th Intl Definition of MI.  - Echo with EF <35%, and mild-mod mixed AS and AI  - At this time he is hypertensive with EF<35%; I would try ACEI or ARB and recheck EF in a few weeks; NOT a candidate for cath or LifeVest now but would like to see response to vasodilator and d/w family before final decision.    2. Afib   - asymptomatic, and rate controlled  - on Eliquis for stroke prevention     3. LE weakness/frequent falls  - significant evaluation in the past year from NS (Dr. Sotelo), Vascular surgery (Dr. Chowdhury) and Neurology (Dr. Romano)   - moderate peripheral neuropathy and abnormal LEFT ROXANN  - It's the right that is symptomatic.    4. Alzheimer's dementia   - on Namenda and Exelon patch     I, Stanley Thomas MD, personally performed the services described as documented by the above named individual. I have made any necessary edits and it is both accurate and complete 5/15/2019  1:36 PM      Scribed for Stanley Thomas MD by Belinda Valencia PA-C. 5/15/2019  9:24 AM      Thank you for allowing me to participate in the care of Gibson Mansfield. Feel free to contact me directly with any further questions or concerns.

## 2019-05-15 NOTE — PROGRESS NOTES
Discharge Planning Assessment  [facility]     Patient Name: Gibson Mansfield  MRN: [id number]  Today's Date: 5/15/2019    Admit Date: 5/15/2019    Discharge Needs Assessment     Row Name 05/15/19 1227       Living Environment    Lives With  spouse    Current Living Arrangements  home/apartment/condo    Primary Care Provided by  spouse/significant other    Provides Primary Care For  no one, unable/limited ability to care for self    Family Caregiver if Needed  spouse    Family Caregiver Names  Lynette Mansfield      Able to Return to Prior Arrangements  no       Resource/Environmental Concerns    Resource/Environmental Concerns  none    Transportation Concerns  car, none       Transition Planning    Patient/Family Anticipates Transition to  home with family    Patient/Family Anticipated Services at Transition      Transportation Anticipated  family or friend will provide       Discharge Needs Assessment    Readmission Within the Last 30 Days  no previous admission in last 30 days    Concerns to be Addressed  denies needs/concerns at this time    Equipment Currently Used at Home  walker, rolling;shower chair    Anticipated Changes Related to Illness  none    Equipment Needed After Discharge  commode    Discharge Facility/Level of Care Needs  home with home health    Offered/Gave Vendor List  no        Discharge Plan     Row Name 05/15/19 1230       Plan    Plan  home w/ HH    Patient/Family in Agreement with Plan  yes    Plan Comments  Spoke with patient and wife at bedside. Wife is primary caregiver for patient. She assists with ADL's. Patient uses a rolling walker and has a shower chair. Patient would like a BSC and gait belt at discharge. Patient will need HH PT/OT at discharge. CM will continue to follow.    Final Discharge Disposition Code  06 - home with home health care        Destination      No service coordination in this encounter.      Durable Medical Equipment      No service coordination in  this encounter.      Dialysis/Infusion      No service coordination in this encounter.      Home Medical Care      No service coordination in this encounter.      Therapy      No service coordination in this encounter.      Community Resources      No service coordination in this encounter.          Demographic Summary     Row Name 05/15/19 1226       General Information    Admission Type  inpatient    Arrived From  hospital    Referral Source  admission list    Reason for Consult  discharge planning    Preferred Language  English     Used During This Interaction  no       Contact Information    Permission Granted to Share Info With      Contact Information Obtained for          Functional Status     Row Name 05/15/19 1227       Functional Status    Usual Activity Tolerance  moderate    Current Activity Tolerance  fair       Functional Status, IADL    Medications  assistive person    Meal Preparation  assistive person    Housekeeping  assistive person    Laundry  assistive person    Shopping  assistive person       Mental Status    General Appearance WDL  WDL       Mental Status Summary    Recent Changes in Mental Status/Cognitive Functioning  no changes       Employment/    Employment Status  retired        Psychosocial    No documentation.       Abuse/Neglect    No documentation.       Legal    No documentation.       Substance Abuse    No documentation.       Patient Forms    No documentation.           Berry Cook RN

## 2019-05-15 NOTE — DISCHARGE PLACEMENT REQUEST
"Soni Wyatt (78 y.o. Male)     Berry Cook RN  [phone]  Fax: [phone]      Date of Birth Social Security Number Address Home Phone MRN    1940  [address]  [place] [address] [phone] [id number]    Zoroastrianism Marital Status          Pentecostalism        Admission Date Admission Type Admitting Provider Attending Provider Department, Room/Bed    5/15/19 Urgent Ruperto Ivory MD Dossett, Lee M, MD [facility] 3E, S349/1    Discharge Date Discharge Disposition Discharge Destination                       Attending Provider:  Ruperto Ivory MD    Allergies:  Penicillins, Sulfa Antibiotics    Isolation:  None   Infection:  None   Code Status:  CPR    Ht:  170.2 cm (67\")   Wt:  78.9 kg (174 lb)    Admission Cmt:  None   Principal Problem:  Fall [W19.XXXA]                 Active Insurance as of 5/15/2019     Primary Coverage     Payor Plan Insurance Group Employer/Plan Group    MEDICARE MEDICARE A & B      Payor Plan Address Payor Plan Phone Number Payor Plan Fax Number Effective Dates    [address] [phone]  10/1/2003 - None Entered    [facility] [id number]       Subscriber Name Subscriber Birth Date Member ID       SONI WYATT 1940 [id number]           Secondary Coverage     Payor Plan Insurance Group Employer/Plan Group    [place] [id number]     Payor Plan Address Payor Plan Phone Number Payor Plan Fax Number Effective Dates    [address]   1/1/2019 - None Entered    [place] [id number]       Subscriber Name Subscriber Birth Date Member ID       EDMUNDO,LYNETTE GOMEZ [birth year]1942 [id number]                 Emergency Contacts      (Rel.) Home Phone Work Phone Mobile Phone    Lynette Wyatt (Spouse) [phone] -- [phone]    Maribell Dallas (Daughter) -- -- [phone]               History & Physical      Silvia Weber DO at 5/15/2019  3:13 AM              [facility] Medicine Services  HISTORY AND " PHYSICAL    Patient Name: Gibson Mansfield  : 1940  MRN: [id number]  Primary Care Physician: Samson Richard MD  Date of admission: 5/15/2019      Subjective   Subjective     Chief Complaint:  Syncope    HPI:  Gibson Mansfield is an 78 y.o. male with a past medical history significant for PAF on Eliquis, COPD, HTN, HLD, Alzheimer's dementia, and DM2 with peripheral neuronopathy who presents as a transfer from [facility] ED after being evaluated for syncopal episode prior to arrival. Patient volunteers that after using the bathroom he stood up from the commode to lean on his walker. States his right leg suddenly gave out. He then became dizzy and fell between the commode and vanity. Denies head trauma or LOC. Episode was unwitnessed. He laid on the floor for about 20 minutes before his wife found him. Upon standing, patient found that he was unable to bear weight on the right hip. Went to ED for further evaluation:    X-ray of pelvis was without acute fracture. However patient still complaining of pain with weight bearing. He is typically ambulatory per walker, blind, and [place]. Labs unremarkable expect troponin was elevated. He was discussed with on call physician for his cardiologist, Dr. Thomas. It was decided to send the patient to [facility] for CT imaging ( CT broken at [place]) of head and pelvis puls undergo further trending of cardiac enzymes.     Patient currently denies associated fever, congestion, chest pain, SOB, abdominal pain, dysuria, or N/V/D. Wife at bedside does note some intermittent cough with drainage. No other complaints at this time. Arrived hemodynamically stable.     Review of Systems   Constitutional: Negative for chills and fever.   HENT: Negative for congestion and trouble swallowing.    Eyes: Negative for photophobia and visual disturbance.   Respiratory: Positive for cough. Negative for shortness of breath.    Cardiovascular: Negative for chest pain and leg swelling.    Gastrointestinal: Negative for abdominal pain, diarrhea, nausea and vomiting.   Endocrine: Negative for cold intolerance and heat intolerance.   Genitourinary: Negative for dysuria and flank pain.   Musculoskeletal: Positive for arthralgias and gait problem. Negative for back pain.   Skin: Negative for pallor and rash.   Allergic/Immunologic: Negative for immunocompromised state.   Neurological: Negative for dizziness, weakness and headaches.   Hematological: Negative for adenopathy.   Psychiatric/Behavioral: Negative for agitation and confusion.          Otherwise complete ROS reviewed and is negative except as mentioned in the HPI.    Personal History     Past Medical History:   Diagnosis Date   • Abdominal hernia    • Atrial fibrillation (CMS/HCC)    • Blindness    • Chronic anticoagulation    • COPD (chronic obstructive pulmonary disease) (CMS/HCC)    • Dementia    • GERD (gastroesophageal reflux disease)    • History of prostate cancer    • History of stroke    • HLD (hyperlipidemia)    • [place] (hard of hearing)    • HTN (hypertension)    • Legally blind    • Macular degeneration of both eyes    • Osteoarthritis of lumbar spine        Past Surgical History:   Procedure Laterality Date   • CATARACT EXTRACTION Bilateral 2016   • COLONOSCOPY  2013   • LUMBAR DISC SURGERY  2000    Levels unknown, Dr. Decker   • LUMBAR DISCECTOMY  2005    Levels unknown, Dr. Decker   • LUMBAR FUSION  1990    Levels unknown, Dr. Decker   • PROSTATE FIDUCIAL MARKER PLACEMENT  2016       Family History: family history includes Breast cancer in his mother; Diabetes in his mother; Heart disease in his father. Otherwise pertinent FHx was reviewed and unremarkable.     Social History:  reports that he quit smoking about 23 years ago. He has a 30.00 pack-year smoking history. He has never used smokeless tobacco. He reports that he does not drink alcohol or use drugs.  Social History     Social History Narrative   • Not on file        Medications:    Available home medication information reviewed.  Medications Prior to Admission   Medication Sig Dispense Refill Last Dose   • apixaban (ELIQUIS) 5 MG tablet tablet Take 5 mg by mouth 2 (Two) Times a Day.   Taking   • Cholecalciferol (VITAMIN D-3 PO) Take  by mouth Daily. As directed     Taking   • folic acid (FOLVITE) 1 MG tablet Take 1 tablet by mouth Daily. 30 tablet 11    • memantine (NAMENDA) 10 MG tablet Take 1 tablet by mouth Daily. 90 tablet 3 Taking   • Multiple Vitamins-Minerals (PRESERVISION AREDS 2 PO) Take  by mouth Daily. As directed     Taking   • pravastatin (PRAVACHOL) 80 MG tablet Take 1 tablet by mouth Daily. 90 tablet 2 Taking   • raNITIdine (ZANTAC) 150 MG tablet Take 1 tablet by mouth Every Night. 90 tablet 2 Taking   • rivastigmine (EXELON) 4.6 MG/24HR patch PLACE 1 PATCH ON THE SKIN  AS DIRECTED BY PROVIDER    DAILY 30 patch 3 Taking   • vitamin B-12 (CYANOCOBALAMIN) 1000 MCG tablet Take 1 tablet by mouth Every Other Day. 45 tablet 3        Allergies   Allergen Reactions   • Penicillins    • Sulfa Antibiotics        Objective   Objective     Vital Signs:            Physical Exam   Constitutional: Awake, alert, but [place]. Deaf in right ear. Blind  Eyes: PERRLA, sclerae anicteric, no conjunctival injection  HENT: NCAT, mucous membranes moist  Neck: Supple, no thyromegaly, no lymphadenopathy, trachea midline  Respiratory: Clear to auscultation bilaterally, nonlabored respirations   Cardiovascular: RRR, no murmurs, rubs, or gallops, palpable pedal pulses bilaterally  Gastrointestinal: Positive bowel sounds, soft, nontender, nondistended  Musculoskeletal: No bilateral ankle edema, no clubbing or cyanosis to extremities  There is decreased ROM at right hip secondary to pain  Psychiatric: Appropriate affect, cooperative  Neurologic: Oriented x 3, strength symmetric in all extremities, Cranial Nerves grossly intact to confrontation, speech clear  Skin: No rashes      Results  Reviewed:  I have personally reviewed current lab, radiology, and data and agree.              Invalid input(s):  ALKPHOS  CrCl cannot be calculated (Patient's most recent lab result is older than the maximum 30 days allowed.).  Brief Urine Lab Results     None        Imaging Results (last 24 hours)     ** No results found for the last 24 hours. **        Results for orders placed during the hospital encounter of 09/08/15   CONVERTED (HISTORICAL) ECHO    Narrative Patient:      SONI WYATT    [facility] Rec#:     [id number]            :          1940            Date:         2015            Age:          74y                   Height:       170.18 cm / 67.0 in  Weight:       76.2 kg / 167.9 lbs  Sex:          M                     BSA:          1.88  Room#:        OP                        Sonographer:  Purnima Aragon [facility]  Referring:    JAZMYN  Reading:      Stanley Thomas MD, [facility], [facility]  Primary:      Galindo Flor MD  ______________________________________________________________________    Transthoracic Echocardiogram    Indication:  AFIB,FATIGUE    Conclusions  1. The study quality is good.   2. The left ventricular chamber size is normal.  3. Moderate concentric left ventricular hypertrophy is observed.  4. The estimated ejection fraction is 55-60%.   5. The left atrium is moderately dilated.  6. Moderate aortic leaflet calcification is visualized.  7. There is mild aortic stenosis.  8. There is mild aortic regurgitation.   9. There is mitral annular calcification.  10. There is mild mitral regurgitation.   11. There is mild tricuspid regurgitation.  12. The right ventricular systolic pressure is calculated at 33 mmHg.   13. There is no evidence of pericardial effusion.    Findings       Procedure Info:  The study quality is good.      Left Ventricle:  The left ventricular chamber size is normal. Moderate concentric left  ventricular hypertrophy is observed. Global left ventricular wall  motion  and contractility are within normal limits. There is normal left  ventricular systolic function. The estimated ejection fraction is  55-60%.      Left Atrium:  The left atrium is moderately dilated.     Right Ventricle:  The right ventricular global systolic function is normal.     Right Atrium:  The right atrial cavity size is normal. No atrial septal defect is  visualized.     Aortic Valve:  The aortic valve is trileaflet. Moderate aortic leaflet calcification is  visualized. There is mild aortic regurgitation.  There is mild aortic  stenosis.     Mitral Valve:  There is mitral annular calcification. The mitral valve leaflets are  mildly thickened. There is no evidence of mitral valve prolapse. There  is mild mitral regurgitation.  There is no evidence of mitral stenosis.      Tricuspid Valve:  The tricuspid valve leaflets are normal.  There is mild tricuspid  regurgitation. The right ventricular systolic pressure is calculated at  33 mmHg.      Pulmonic Valve:  The pulmonic valve appears normal. There is a trace pulmonic  regurgitation.      Pericardium:  There is no evidence of pericardial effusion.     Aorta:  There is no dilatation of the aortic root.     Pulmonary Artery:  The main pulmonary artery appears normal.     Venous:  The venous system appears normal.     Measurements   Chambers  Name                    Value           IVSd (2D)               1.44 cm         LVIDd (2D)              4.53 cm         LVIDs (2D)              3.16 cm         LVPWd (2D)              1.31 cm         EF (2D)                 57.7 %          Ao root diameter (2D)   3.3 cm          LA dimension (AP) 2D    5.5 cm          LA:Ao ratio (2D)        1.67 ratio        Aortic Valve  Name                    Value           AV Vmax                 2.02 m/sec      AV VTI                  34.2 cm         AV peak gradient        16 mmHg         AV mean gradient        10 mmHg         LVOT diameter           2.1 cm          LVOT  Vmax               0.81 m/sec      LVOT VTI                14.1 cm         LVOT peak gradient      3 mmHg          LVOT mean gradient      1 mmHg          SV LVOT                 49 ml           STEPHAN (continuity Vmax)   1.39 cm2        STEPHAN (continuity VTI)    1.43 cm2        AR PHT                  609 msec        AR peak gradient        88 mmHg           Tricuspid Valve  Name                    Value           TR Vmax                 2.41 m/sec      TR peak gradient        23 mmHg         RAP                     10 mmHg         RVSP                    33 mmHg           Pulmonic Valve/Qp:Qs  Name                    Value           PV acceleration time    151 msec          Electronically signed by: Stanley Thomas MD, [facility], [facility] on 09/08/2015  17:51:22       Assessment/Plan   Assessment / Plan     Active Hospital Problems    Diagnosis POA   • **Syncope [R55] Yes     Priority: High   • Elevated troponin [R74.8] Yes     Priority: High   • COPD (chronic obstructive pulmonary disease) (CMS/HCC) [J44.9] Yes     Priority: Medium   • PAF (paroxysmal atrial fibrillation) (CMS/[facility]) [I48.0] Yes     Priority: Medium   • Late onset Alzheimer's disease without behavioral disturbance [G30.1, F02.80] Yes     Priority: Low   • Peripheral neuropathy [G62.9] Yes     Priority: Low   • DDD (degenerative disc disease), cervical [M50.30] Yes     Priority: Low   • Generalized weakness [R53.1] Yes     Priority: Low         1. Syncope:  - vasovagal vs cardiogenic  - continue to trend cardiac enzymes. Obtain EKG and echocardiogram  - CT head without contrast  - CT Pelvic  - PT/OT treat and eval  - orthostatics  - am labs    2. Elevated troponin:  - continue to trend  - no history of CAD  - consult to cardiology. Appreciate input    3. Atrial fibrillation:  - currently stable and rate controlled. Monitor.  On Eliquis. Continue.     4. DM2:  - with neuropathy. Now stable and diet controlled.    5. COPD:  - controlled and stable. Not on  supplemental oxygen. Smoker in the past  - duo nebs with additional pulmonary toilet as needed    DVT prophylaxis:  Eliquis    CODE STATUS:  Full code  Code Status and Medical Interventions:   Ordered at: 05/15/19 0309     Level Of Support Discussed With:    Patient     Code Status:    CPR     Medical Interventions (Level of Support Prior to Arrest):    Full       Admission Status:  I believe this patient meets INPATIENT status due to the need for care which can only be reasonably provided in an hospital setting elevated trop and fall resulting in right hip pain.  In such, I feel patient’s risk for adverse outcomes and need for care warrant INPATIENT evaluation and predict the patient’s care encounter to likely last beyond 2 midnights.      Electronically signed by Ronnie Villagomez PA-C, 05/15/19, 3:13 AM.        Brief Attending Admission Attestation     I have seen and examined the patient, performing an independent face-to-face diagnostic evaluation with plan of care reviewed and developed with the advanced practice clinician (APC).      Brief Summary Statement:   Gibson Mansfield is a 78 y.o. male with past medical history of COPD, degenerative disc disease of the cervical spine, type 2 diabetes mellitus, Alzheimer's dementia, paroxysmal atrial fibrillation and peripheral neuropathy.  Patient was taken to [facility] emergency department after his wife noted he had fallen in the floor in the bathroom.  Patient states that he slid down to the floor.  At the ED on the outside hospital patient was noted to have shortening and external rotation of the right lower extremity.  Patient will be admitted we will consult cardiology due to increase in troponin.  We will also obtain CT of the pelvis to rule out an occult fracture.    Remainder of detailed HPI is as noted above and has been reviewed and/or edited by me for completeness.      Attending Physical Exam:  Vitals:    05/15/19 0434   BP: 124/71   Pulse: 78    Resp: 18   Temp: 97.5 °F (36.4 °C)   SpO2: 94%     Gen-NAD  HEENT-atraumatic, normocephalic, PERRLA, EOMI, moist mucous membranes   CV-irregularly irregular with systolic ejection murmur noted  Resp-CTAB, no rales, no rhonchi, no wheezing  Abd-normal BS, soft, ND, NT   Ext-no edema or clubbing, pedal pulses intact, also tender over the right greater trochanter, pain with flexion of the right hip.  Skin-warm, dry, no rashes or lesions noted  Neuro-alert, pleasant, orientated to person, place and time, CN II-XII grossly intact, equal strength in upper and lower extremities  Psych-appropriate mood and behavior      Brief Assessment/Plan :  See above for further detailed assessment and plan developed with APC which I have reviewed and/or edited for completeness.      Electronically signed by Silvia Weber DO, 05/15/19, 5:45 AM.             Electronically signed by Silvia Weber DO at 5/15/2019  5:49 AM       Hospital Medications (active)       Dose Frequency Start End    apixaban (ELIQUIS) tablet 5 mg 5 mg Every 12 Hours Scheduled 5/15/2019     Sig - Route: Take 1 tablet by mouth Every 12 (Twelve) Hours. - Oral    atorvastatin (LIPITOR) tablet 20 mg 20 mg Nightly 5/15/2019     Sig - Route: Take 1 tablet by mouth Every Night. - Oral    famotidine (PEPCID) tablet 20 mg 20 mg Daily 5/15/2019     Sig - Route: Take 1 tablet by mouth Daily. - Oral    memantine (NAMENDA) tablet 10 mg 10 mg Daily 5/15/2019     Sig - Route: Take 1 tablet by mouth Daily. - Oral    rivastigmine (EXELON) 4.6 MG/24HR patch 1 patch 1 patch Daily 5/15/2019     Sig - Route: Place 1 patch on the skin as directed by provider Daily. - Transdermal    sodium chloride 0.9 % flush 3 mL 3 mL Every 12 Hours Scheduled 5/15/2019     Sig - Route: Infuse 3 mL into a venous catheter Every 12 (Twelve) Hours. - Intravenous    sodium chloride 0.9 % flush 3-10 mL 3-10 mL As Needed 5/15/2019     Sig - Route: Infuse 3-10 mL into a venous catheter As Needed for Line  Care. - Intravenous    Sulfur Hexafluoride Microsph 60.7-25 MG reconstituted suspension 3 mL 3 mL Once 5/15/2019 5/15/2019    Sig - Route: Infuse 3 mL into a venous catheter 1 (One) Time. - Intravenous          Referral Orders (last 24 hours) (24h ago, onward)    Start     Ordered    05/15/19 0000  Ambulatory Referral to Home Health      05/15/19 1249             Physician Progress Notes (last 24 hours) (Notes from 5/14/2019 12:51 PM through 5/15/2019 12:51 PM)      Ruperto Ivory MD at 5/15/2019 10:37 AM        Patient seen and examined.  Please refer to the history and physical dated today for full details.  Patient is an 78-year-old male who was right leg gave out as he was getting up from the toilet.  No syncope or loss of consciousness.  Had some pain in the leg afterwards, negative x-rays at the other facility.  Had a very mildly elevated troponin and was transferred here for further evaluation.  Work-up here has shown a negative CT pelvis with no evidence of fracture and he has already been up ambulating today with walker.  Troponin is minimally elevated 0.09.  Echocardiogram done with an EF of less than 35% which is a new finding.  Dr. Thomas from cardiology final recommendations pending.  Discussed with wife and patient they feel he is doing well and should be able to go home at discharge.  Await therapy evaluations.    Electronically signed by Ruperto Ivory MD, 05/15/19, 10:39 AM.      Electronically signed by Ruperto Ivory MD at 5/15/2019 10:39 AM       Physical Therapy Notes (last 24 hours) (Notes from 5/14/2019 12:51 PM through 5/15/2019 12:51 PM)     No notes of this type exist for this encounter.        Occupational Therapy Notes (last 24 hours) (Notes from 5/14/2019 12:51 PM through 5/15/2019 12:51 PM)     No notes of this type exist for this encounter.

## 2019-05-15 NOTE — NURSING NOTE
ACC REVIEW REPORT: [facility]        PATIENT NAME: Gibson Mansfield    PATIENT ID: [id number]    BED: S349    BED TYPE: tele    BED GIVEN TO: Aruna Cook RN    TIME BED GIVEN: 2345    YOB: 1940    AGE: 78    GENDER: male    PREVIOUS ADMIT TO [facility]: yes    PREVIOUS ADMISSION DATE:     PATIENT CLASS: inpatient    TODAY'S DATE: 5/14/2019    TRANSFER DATE: 5/15/19    ETA: 0100    TRANSFERRING FACILITY: [facility] ED    TRANSFERRING FACILITY PHONE # : [phone]    TRANSFERRING MD: Candelaria    DATE/TIME REQUEST RECEIVED: 5/14 @ 0848    [facility] RN: Anabella Smith RN    REPORT FROM: Aruna Cook RN    TIME REPORT TAKEN: 2340    DIAGNOSIS: elevated troponin, syncopal episode    REASON FOR TRANSFER TO [facility]: higher level of care    TRANSPORTATION: local ground    CLINICAL REASON FOR TRANSFER TO [facility]: higher level of care      CLINICAL INFORMATION    HEIGHT: 66 inches    WEIGHT: 178 lbs    ALLERGIES: PCN, sulfa    FARAH: no    INFECTIOUS DISEASE:     ISOLATION:     VITAL SIGNS:   TEMP: 97.8  PULSE: 89  B/P: 156/71  RESP: 18    LAB INFORMATION: h/h 12.8/39.1, BUN/Cr 18/1.4, glucose 154, INR 1.2 troponin 0.276    CULTURE INFORMATION: no    MEDS/IV FLUIDS: #22 right hand - SL  1 liter NS   81 mg asa      CARDIAC SYSTEM:    CHEST PAIN: none    RATE:     SCALE:     RHYTHM: afib    Is patient taking or has patient been given any drugs that could increase bleeding? yes  (Plavix, Brilinta, Effient, Eliquis, Xarelto, Warfarin, Integrilin, Angiomax)    DRUG: eliquis     DOSE/FREQUENCY: 5mg bid        RESPIRATORY SYSTEM:    LUNG SOUNDS:    CLEAR: y  CRACKLES:   WHEEZES:   RHONCHI:   DIMINISHED:     OXYGEN: none    O2 SAT: 94%     ADMINISTRATION ROUTE: room air      RESPIRATORY STATUS: stable      CNS/MUSCULOSKELETAL    ALERT AND ORIENTED:    PERSON: y  PLACE: y  TIME: y    JAMAL COMA SCALE:   15    GI//GY      ABDOMINAL PAIN: n    VOMITING: n    DIARRHEA: n    NAUSEA: n    BOWEL SOUNDS: +    GI//GY  NOTES: none    PAST MEDICAL HISTORY: afib, NIDDM, dementia, macular degeneration, hard of hearing, decreased vision, CVA with right sided weakness    OTHER SYMPTOM NOTES:     ADDITIONAL NOTES: Patient presents to OSH via personal vehicle at 2114 with c/o of weakness/fall and hip pain. Patient reports he has had right sided weakness from his previous stroke, however his right leg was worse tonight and his right leg gave out, he fell and hit his hip. Radiological exam was negative.           Deann Smith RN  5/14/2019  11:37 PM

## 2019-05-15 NOTE — PROGRESS NOTES
Continued Stay Note  [facility]     Patient Name: Gibson Mansfield  MRN: [id number]  Today's Date: 5/15/2019    Admit Date: 5/15/2019    Discharge Plan     Row Name 05/15/19 4765       Plan    Plan Comments  Spoke with Serena at [facility] and faxed referral.    Row Name 05/15/19 2531       Plan    Plan  home w/ HH    Patient/Family in Agreement with Plan  yes    Plan Comments  Spoke with patient and wife at bedside. Wife is primary caregiver for patient. She assists with ADL's. Patient uses a rolling walker and has a shower chair. Patient would like a BSC and gait belt at discharge. Patient will need HH PT/OT at discharge. CM will continue to follow.    Final Discharge Disposition Code  06 - home with home health care        Discharge Codes    No documentation.             Berry Cook RN

## 2019-05-15 NOTE — THERAPY EVALUATION
Acute Care - Physical Therapy Initial Evaluation  [facility]     Patient Name: Gibson Mansfield  : 1940  MRN: [id number]  Today's Date: 5/15/2019   Onset of Illness/Injury or Date of Surgery: 05/15/19  Date of Referral to PT: 05/15/19  Referring Physician: Hernando AUGUSTIN      Admit Date: 5/15/2019    Visit Dx:     ICD-10-CM ICD-9-CM   1. Elevated troponin R74.8 790.6   2. Fall, initial encounter W19.XXXA E888.9   3. Impaired functional mobility, balance, gait, and endurance Z74.09 V49.89     Patient Active Problem List   Diagnosis   • Prostate cancer (CMS/HCC)   • PAF (paroxysmal atrial fibrillation) (CMS/[facility])   • Generalized weakness   • Ataxia   • Hypersomnolence   • Cerebral microvascular disease   • Late onset Alzheimer's disease without behavioral disturbance   • Peripheral neuropathy   • DDD (degenerative disc disease), cervical   • Elevated troponin   • Fall   • COPD (chronic obstructive pulmonary disease) (CMS/HCC)   • DM2 (diabetes mellitus, type 2) (CMS/[facility])   • Tympanic membrane perforation   • Sensorineural hearing loss of left ear   • Otitis externa   • Mixed hearing loss of right ear     Past Medical History:   Diagnosis Date   • Abdominal hernia    • Atrial fibrillation (CMS/HCC)    • Blindness    • Chronic anticoagulation    • COPD (chronic obstructive pulmonary disease) (CMS/HCC)    • Dementia    • GERD (gastroesophageal reflux disease)    • History of prostate cancer    • History of stroke    • HLD (hyperlipidemia)    • [place] (hard of hearing)    • HTN (hypertension)    • Legally blind    • Macular degeneration of both eyes    • Osteoarthritis of lumbar spine      Past Surgical History:   Procedure Laterality Date   • CATARACT EXTRACTION Bilateral    • COLONOSCOPY     • LUMBAR DISC SURGERY      Levels unknown, Dr. Decker   • LUMBAR DISCECTOMY  2005    Levels unknown, Dr. Decker   • LUMBAR FUSION      Levels unknown, Dr. Decker   • PROSTATE FIDUCIAL MARKER PLACEMENT           PT ASSESSMENT (last 12 hours)      Physical Therapy Evaluation     Row Name 05/15/19 1430          PT Evaluation Time/Intention    Subjective Information  complains of;weakness  -SC     Document Type  evaluation  -SC     Mode of Treatment  physical therapy  -SC     Patient Effort  good  -SC     Symptoms Noted During/After Treatment  none  -SC     Row Name 05/15/19 1430          General Information    Patient Profile Reviewed?  yes  -SC     Onset of Illness/Injury or Date of Surgery  05/15/19  -SC     Referring Physician  Hernando PA-C  -SC     Patient Observations  agree to therapy;cooperative;alert  -SC     Patient/Family Observations  wife  -SC     General Observations of Patient  in bed  -SC     Prior Level of Function  independent:;community mobility;gait;transfer walks with walker  -SC     Equipment Currently Used at Home  -- rollator  -SC     Pertinent History of Current Functional Problem  to ED after near syncope in bathroom. He states he lowered himself to the floor. Labs found to be elevated TRopnin.  -SC     Existing Precautions/Restrictions  fall  -SC     Risks Reviewed  patient:;patient and family:;increased drainage;change in vital signs;increased discomfort  -SC     Benefits Reviewed  improve function;increase independence;increase strength  -SC     Barriers to Rehab  medically complex;previous functional deficit  -SC     Row Name 05/15/19 1430          Relationship/Environment    Lives With  spouse  -[facility] Name 05/15/19 1430          Resource/Environmental Concerns    Current Living Arrangements  home/apartment/condo  -[facility] Name 05/15/19 1430          Cognitive Assessment/Intervention- PT/OT    Orientation Status (Cognition)  oriented x 3  -SC     Follows Commands (Cognition)  follows one step commands;over 90% accuracy;verbal cues/prompting required  -[facility] Name 05/15/19 1430          Safety Issues, Functional Mobility    Safety Issues Affecting Function (Mobility)  insight into  deficits/self awareness;problem solving  -SC     Impairments Affecting Function (Mobility)  balance;motor control;motor planning;postural/trunk control;strength  -SC     Row Name 05/15/19 1430          Bed Mobility Assessment/Treatment    Bed Mobility Assessment/Treatment  scooting/bridging;supine-sit  -SC     Scooting/Bridging [place] (Bed Mobility)  verbal cues;minimum assist (75% patient effort)  -SC     Bed Mobility, Safety Issues  impaired trunk control for bed mobility  -SC     Assistive Device (Bed Mobility)  head of bed elevated;bed rails  -SC     Comment (Bed Mobility)  up to edge of bed with cues and assist. Demonstrated slow difficulty transfer to edge of bed.  -SC     Row Name 05/15/19 1430          Transfer Assessment/Treatment    Transfer Assessment/Treatment  sit-stand transfer;stand-sit transfer  -SC     Comment (Transfers)  cues for safety and using hand on railing  -SC     Sit-Stand [place] (Transfers)  contact guard  -SC     Stand-Sit [place] (Transfers)  contact guard  -SC     Row Name 05/15/19 1430          Sit-Stand Transfer    Assistive Device (Sit-Stand Transfers)  walker, front-wheeled  -SC     Row Name 05/15/19 1430          Stand-Sit Transfer    Assistive Device (Stand-Sit Transfers)  walker, front-wheeled  -SC     Row Name 05/15/19 1430          Gait/Stairs Assessment/Training    Gait/Stairs Assessment/Training  gait/ambulation independence  -SC     [place] Level (Gait)  contact guard  -SC     Assistive Device (Gait)  walker, front-wheeled  -SC     Distance in Feet (Gait)  60  -SC     Pattern (Gait)  step-through  -SC     Deviations/Abnormal Patterns (Gait)  marychuy decreased  -SC     Bilateral Gait Deviations  forward flexed posture sever forward flexion  -SC     Comment (Gait/Stairs)  demonstrated slow flexed gait. Cues for staying closer to walker and improving posture given-- Unable to sustain either.   -SC     Row Name 05/15/19 1430          General ROM     Head/Neck/Trunk  -- trunk limited in extension  -SC     RT Upper Ext  -- wfl  -SC     LT Upper Ext  -- wfl  -SC     RT Lower Ext  -- wfl  -SC     LT Lower Ext  -- wfl  -SC     Row Name 05/15/19 1430          MMT (Manual Muscle Testing)    Rt Lower Ext  -- dorsiflexion 3/5, quads 4/5, hip flex -3/5  -SC     Lt Lower Ext  -- tib ant 3+/5, quads 4+/5, hip flexion 3+/5  -SC     Row Name 05/15/19 1430          Motor Assessment/Intervention    Additional Documentation  Balance (Group);Therapeutic Exercise (Group);Therapeutic Exercise Interventions (Group)  -SC     Row Name 05/15/19 1430          Balance    Balance  static sitting balance;static standing balance;dynamic sitting balance;dynamic standing balance  -SC     Row Name 05/15/19 1430          Static Sitting Balance    Level of [place] (Unsupported Sitting, Static Balance)  supervision  -SC     Sitting Position (Unsupported Sitting, Static Balance)  sitting on edge of bed  -SC     Time Able to Maintain Position (Unsupported Sitting, Static Balance)  3 to 4 minutes  -SC     Row Name 05/15/19 1430          Static Standing Balance    Level of [place] (Supported Standing, Static Balance)  contact guard assist  -SC     Time Able to Maintain Position (Supported Standing, Static Balance)  2 to 3 minutes  -SC     Assistive Device Utilized (Supported Standing, Static Balance)  walker, rolling  -SC     Comment (Supported Standing, Static Balance)  cues for upright  posture  -SC     Row Name 05/15/19 1430          Dynamic Standing Balance    Level of [place], Reaches Outside Midline (Standing, Dynamic Balance)  minimal assist, 75% patient effort  -SC     Time Able to Maintain Position, Reaches Outside Midline (Standing, Dynamic Balance)  3 to 4 minutes  -SC     Assistive Device Utilized (Supported Standing, Dynamic Balance)  walker, rolling  -SC     Row Name 05/15/19 1430          Pain Assessment    Additional Documentation  Pain Scale: Numbers  Pre/Post-Treatment (Group)  -SC     Row Name 05/15/19 1430          Pain Scale: Numbers Pre/Post-Treatment    Pain Scale: Numbers, Pretreatment  0/10 - no pain  -SC     Pain Scale: Numbers, Post-Treatment  0/10 - no pain  -[facility] Name 05/15/19 1430          Coping    Observed Emotional State  accepting  -SC     Verbalized Emotional State  acceptance  -SC     Row Name 05/15/19 1430          Plan of Care Review    Plan of Care Reviewed With  patient;spouse  -SC     Row Name 05/15/19 1430          Physical Therapy Clinical Impression    Date of Referral to PT  05/15/19  -SC     PT Diagnosis (PT Clinical Impression)  impaired mobility  -SC     Patient/Family Goals Statement (PT Clinical Impression)  go home  -SC     Criteria for Skilled Interventions Met (PT Clinical Impression)  yes;treatment indicated  -SC     Pathology/Pathophysiology Noted (Describe Specifically for Each System)  musculoskeletal;neuromuscular  -SC     Impairments Found (describe specific impairments)  gait, locomotion, and balance;motor function;muscle performance;posture  -SC     Rehab Potential (PT Clinical Summary)  good, to achieve stated therapy goals  -SC     Care Plan Review (PT)  risks/benefits reviewed;care plan/treatment goals reviewed;evaluation/treatment results reviewed  -SC     Care Plan Review, Other Participant (PT Clinical Impression)  spouse  -SC     Row Name 05/15/19 1430          Vital Signs    Pre Systolic BP Rehab  134 SUPIINE  -SC     Pre Treatment Diastolic BP  84  -SC     Intra Systolic BP Rehab  135 SITTING  -SC     Intra Treatment Diastolic BP  96  -SC     Post Systolic BP Rehab  122 STANDING  -SC     Post Treatment Diastolic BP  90  -SC     Pretreatment Heart Rate (beats/min)  72  -SC     Intratreatment Heart Rate (beats/min)  84  -SC     Posttreatment Heart Rate (beats/min)  74  -SC     Row Name 05/15/19 1430          Physical Therapy Goals    Bed Mobility Goal Selection (PT)  bed mobility, PT goal 1  -SC      Transfer Goal Selection (PT)  transfer, PT goal 1  -SC     Gait Training Goal Selection (PT)  gait training, PT goal 1  -SC     Row Name 05/15/19 1430          Bed Mobility Goal 1 (PT)    Activity/Assistive Device (Bed Mobility Goal 1, PT)  scooting;supine to sit  -SC     [place] Level/Cues Needed (Bed Mobility Goal 1, PT)  conditional independence  -SC     Time Frame (Bed Mobility Goal 1, PT)  long term goal (LTG);10 days  -SC     Row Name 05/15/19 1430          Transfer Goal 1 (PT)    Activity/Assistive Device (Transfer Goal 1, PT)  sit-to-stand/stand-to-sit;walker, rolling  -SC     [place] Level/Cues Needed (Transfer Goal 1, PT)  conditional independence  -SC     Time Frame (Transfer Goal 1, PT)  long term goal (LTG);10 days  -SC     Row Name 05/15/19 1430          Gait Training Goal 1 (PT)    Activity/Assistive Device (Gait Training Goal 1, PT)  gait (walking locomotion);walker, rolling ROLLATOR AT HOME  -SC     [place] Level (Gait Training Goal 1, PT)  conditional independence  -SC     Distance (Gait Goal 1, PT)  150  -SC     Time Frame (Gait Training Goal 1, PT)  long term goal (LTG);10 days  -SC     Row Name 05/15/19 1430          Positioning and Restraints    Pre-Treatment Position  in bed  -SC     Post Treatment Position  chair  -SC     In Chair  sitting;call light within reach;encouraged to call for assist;with OT  -SC       User Key  (r) = Recorded By, (t) = Taken By, (c) = Cosigned By    Initials Name Provider Type    SC Johnnie Beasley PT Physical Therapist        Physical Therapy Education     Title: PT OT SLP Therapies (Done)     Topic: Physical Therapy (Done)     Point: Mobility training (Done)     Learning Progress Summary           Patient BEN Mcfarlane VU, NR, DU by SC at 5/15/2019  2:30 PM    Comment:  REVIEWED BENEFITS OF ACTIVITY                   Point: Home exercise program (Done)     Learning Progress Summary           Patient BEN Mcfarlane VU, NR, DU by SC at 5/15/2019  2:30 PM     Comment:  REVIEWED BENEFITS OF ACTIVITY                   Point: Body mechanics (Done)     Learning Progress Summary           Patient BEN Mcfarlane, VU,NR,DU by SC at 5/15/2019  2:30 PM    Comment:  REVIEWED BENEFITS OF ACTIVITY                   Point: Precautions (Done)     Learning Progress Summary           Patient BEN Mcfarlane, VU,NR,DU by SC at 5/15/2019  2:30 PM    Comment:  REVIEWED BENEFITS OF ACTIVITY                               User Key     Initials Effective Dates Name Provider Type Discipline    SC 06/19/15 -  Johnnie Beasley, PT Physical Therapist PT              PT Recommendation and Plan  Anticipated Discharge Disposition (PT): home with home health  Planned Therapy Interventions (PT Eval): bed mobility training, gait training, home exercise program, patient/family education, strengthening, postural re-education, transfer training  Therapy Frequency (PT Clinical Impression): daily  Outcome Summary/Treatment Plan (PT)  Anticipated Discharge Disposition (PT): home with home health  Plan of Care Reviewed With: patient, spouse  Progress: improving  Outcome Summary: Roxie presents wtih multiple motor dysfunctions- postural, strength, endurance. He was able to ambulate 60 feet with walker today and improvement from yesterday. With this said he is proberly close to his baselline. Will continue to see for general strengthening. Recommend home with Home Health PT.  Outcome Measures     Row Name 05/15/19 3250             How much help from another person do you currently need...    Turning from your back to your side while in flat bed without using bedrails?  3  -SC      Moving from lying on back to sitting on the side of a flat bed without bedrails?  2  -SC      Moving to and from a bed to a chair (including a wheelchair)?  3  -SC      Standing up from a chair using your arms (e.g., wheelchair, bedside chair)?  3  -SC      Climbing 3-5 steps with a railing?  2  -SC      To walk in hospital room?  3  -SC       AM-PAC 6 Clicks Score  16  -SC         Functional Assessment    Outcome Measure Options  AM-PAC 6 Clicks Basic Mobility (PT)  -SC        User Key  (r) = Recorded By, (t) = Taken By, (c) = Cosigned By    Initials Name Provider Type    SC Johnnie Beasley, PT Physical Therapist         Time Calculation:   PT Charges     Row Name 05/15/19 1430             Time Calculation    Start Time  1430  -SC      PT Received On  05/15/19  -SC      PT Goal Re-Cert Due Date  05/25/19  -SC        User Key  (r) = Recorded By, (t) = Taken By, (c) = Cosigned By    Initials Name Provider Type    SC Johnnie Beasley, PT Physical Therapist        Therapy Charges for Today     Code Description Service Date Service Provider Modifiers Qty    [id number]  PT EVAL MOD COMPLEXITY 4 5/15/2019 Johnnie Beasley, PT GP 1          PT G-Codes  Outcome Measure Options: AM-PAC 6 Clicks Basic Mobility (PT)  AM-PAC 6 Clicks Score: 16      Johnnie Beasley PT  5/15/2019

## 2019-05-15 NOTE — PLAN OF CARE
Problem: Patient Care Overview  Goal: Plan of Care Review  Outcome: Ongoing (interventions implemented as appropriate)   05/15/19 1500   Plan of Care Review   Progress improving   OTHER   Outcome Summary OT eval complete. Pt completes sit to stand with CGA and ambulates with Eddi and RW. Pt requires verbal cues for safety with ambulation and RW. Pt dof/don socks with CGA and wife reports she generally assists with all ADLS. Recommend tub bench and raised toilet seat. OT issued hand out. Anticipate D/C home with assist from wife and HH.    Coping/Psychosocial   Plan of Care Reviewed With spouse;patient

## 2019-05-15 NOTE — PROGRESS NOTES
Continued Stay Note  [facility]     Patient Name: Gibson Mansfield  MRN: [id number]  Today's Date: 5/15/2019    Admit Date: 5/15/2019    Discharge Plan     Row Name 05/15/19 1307       Plan    Plan Comments  Spoke with Zakia at [facility]. They will deliver a BSC today.    Row Name 05/15/19 1255       Plan    Plan Comments  Spoke with Serena at [facility] and faxed referral.    Row Name 05/15/19 1231       Plan    Plan  home w/ HH    Patient/Family in Agreement with Plan  yes    Plan Comments  Spoke with patient and wife at bedside. Wife is primary caregiver for patient. She assists with ADL's. Patient uses a rolling walker and has a shower chair. Patient would like a BSC and gait belt at discharge. Patient will need HH PT/OT at discharge. CM will continue to follow.    Final Discharge Disposition Code  06 - home with home health care        Discharge Codes    No documentation.             Berry Cook RN

## 2019-05-15 NOTE — PLAN OF CARE
Problem: Patient Care Overview  Goal: Plan of Care Review  Outcome: Ongoing (interventions implemented as appropriate)   05/15/19 0660   Plan of Care Review   Progress improving   OTHER   Outcome Summary Patient presents with multiple motor dysfunctions- postural, strength, endurance. He was able to ambulate 60 feet with walker today, an improvement from yesterday. With this said he is probably close to his baseline. Will continue to see for general strengthening. Recommend home with Home Health PT.   Coping/Psychosocial   Plan of Care Reviewed With patient;spouse

## 2019-05-15 NOTE — H&P
[facility] Medicine Services  HISTORY AND PHYSICAL    Patient Name: Gibson Mansfield  : 1940  MRN: [id number]  Primary Care Physician: Samson Richard MD  Date of admission: 5/15/2019      Subjective   Subjective     Chief Complaint:  Syncope    HPI:  Gibson Mansfield is an 78 y.o. male with a past medical history significant for PAF on Eliquis, COPD, HTN, HLD, Alzheimer's dementia, and DM2 with peripheral neuronopathy who presents as a transfer from [facility] ED after being evaluated for syncopal episode prior to arrival. Patient volunteers that after using the bathroom he stood up from the commode to lean on his walker. States his right leg suddenly gave out. He then became dizzy and fell between the commode and vanity. Denies head trauma or LOC. Episode was unwitnessed. He laid on the floor for about 20 minutes before his wife found him. Upon standing, patient found that he was unable to bear weight on the right hip. Went to ED for further evaluation:    X-ray of pelvis was without acute fracture. However patient still complaining of pain with weight bearing. He is typically ambulatory per walker, blind, and [place]. Labs unremarkable expect troponin was elevated. He was discussed with on call physician for his cardiologist, Dr. Thomas. It was decided to send the patient to [facility] for CT imaging ( CT broken at [place]) of head and pelvis puls undergo further trending of cardiac enzymes.     Patient currently denies associated fever, congestion, chest pain, SOB, abdominal pain, dysuria, or N/V/D. Wife at bedside does note some intermittent cough with drainage. No other complaints at this time. Arrived hemodynamically stable.     Review of Systems   Constitutional: Negative for chills and fever.   HENT: Negative for congestion and trouble swallowing.    Eyes: Negative for photophobia and visual disturbance.   Respiratory: Positive for cough. Negative for shortness of breath.     Cardiovascular: Negative for chest pain and leg swelling.   Gastrointestinal: Negative for abdominal pain, diarrhea, nausea and vomiting.   Endocrine: Negative for cold intolerance and heat intolerance.   Genitourinary: Negative for dysuria and flank pain.   Musculoskeletal: Positive for arthralgias and gait problem. Negative for back pain.   Skin: Negative for pallor and rash.   Allergic/Immunologic: Negative for immunocompromised state.   Neurological: Negative for dizziness, weakness and headaches.   Hematological: Negative for adenopathy.   Psychiatric/Behavioral: Negative for agitation and confusion.          Otherwise complete ROS reviewed and is negative except as mentioned in the HPI.    Personal History     Past Medical History:   Diagnosis Date   • Abdominal hernia    • Atrial fibrillation (CMS/HCC)    • Blindness    • Chronic anticoagulation    • COPD (chronic obstructive pulmonary disease) (CMS/HCC)    • Dementia    • GERD (gastroesophageal reflux disease)    • History of prostate cancer    • History of stroke    • HLD (hyperlipidemia)    • [place] (hard of hearing)    • HTN (hypertension)    • Legally blind    • Macular degeneration of both eyes    • Osteoarthritis of lumbar spine        Past Surgical History:   Procedure Laterality Date   • CATARACT EXTRACTION Bilateral 2016   • COLONOSCOPY  2013   • LUMBAR DISC SURGERY  2000    Levels unknown, Dr. Decker   • LUMBAR DISCECTOMY  2005    Levels unknown, Dr. Decker   • LUMBAR FUSION  1990    Levels unknown, Dr. Decker   • PROSTATE FIDUCIAL MARKER PLACEMENT  2016       Family History: family history includes Breast cancer in his mother; Diabetes in his mother; Heart disease in his father. Otherwise pertinent FHx was reviewed and unremarkable.     Social History:  reports that he quit smoking about 23 years ago. He has a 30.00 pack-year smoking history. He has never used smokeless tobacco. He reports that he does not drink alcohol or use drugs.  Social History      Social History Narrative   • Not on file       Medications:    Available home medication information reviewed.  Medications Prior to Admission   Medication Sig Dispense Refill Last Dose   • apixaban (ELIQUIS) 5 MG tablet tablet Take 5 mg by mouth 2 (Two) Times a Day.   Taking   • Cholecalciferol (VITAMIN D-3 PO) Take  by mouth Daily. As directed     Taking   • folic acid (FOLVITE) 1 MG tablet Take 1 tablet by mouth Daily. 30 tablet 11    • memantine (NAMENDA) 10 MG tablet Take 1 tablet by mouth Daily. 90 tablet 3 Taking   • Multiple Vitamins-Minerals (PRESERVISION AREDS 2 PO) Take  by mouth Daily. As directed     Taking   • pravastatin (PRAVACHOL) 80 MG tablet Take 1 tablet by mouth Daily. 90 tablet 2 Taking   • raNITIdine (ZANTAC) 150 MG tablet Take 1 tablet by mouth Every Night. 90 tablet 2 Taking   • rivastigmine (EXELON) 4.6 MG/24HR patch PLACE 1 PATCH ON THE SKIN  AS DIRECTED BY PROVIDER    DAILY 30 patch 3 Taking   • vitamin B-12 (CYANOCOBALAMIN) 1000 MCG tablet Take 1 tablet by mouth Every Other Day. 45 tablet 3        Allergies   Allergen Reactions   • Penicillins    • Sulfa Antibiotics        Objective   Objective     Vital Signs:            Physical Exam   Constitutional: Awake, alert, but [place]. Deaf in right ear. Blind  Eyes: PERRLA, sclerae anicteric, no conjunctival injection  HENT: NCAT, mucous membranes moist  Neck: Supple, no thyromegaly, no lymphadenopathy, trachea midline  Respiratory: Clear to auscultation bilaterally, nonlabored respirations   Cardiovascular: RRR, no murmurs, rubs, or gallops, palpable pedal pulses bilaterally  Gastrointestinal: Positive bowel sounds, soft, nontender, nondistended  Musculoskeletal: No bilateral ankle edema, no clubbing or cyanosis to extremities  There is decreased ROM at right hip secondary to pain  Psychiatric: Appropriate affect, cooperative  Neurologic: Oriented x 3, strength symmetric in all extremities, Cranial Nerves grossly intact to confrontation,  speech clear  Skin: No rashes      Results Reviewed:  I have personally reviewed current lab, radiology, and data and agree.              Invalid input(s):  ALKPHOS  CrCl cannot be calculated (Patient's most recent lab result is older than the maximum 30 days allowed.).  Brief Urine Lab Results     None        Imaging Results (last 24 hours)     ** No results found for the last 24 hours. **        Results for orders placed during the hospital encounter of 09/08/15   CONVERTED (HISTORICAL) ECHO    Narrative Patient:      SONI WYATT    [facility] Rec#:     [id number]            :          1940            Date:         2015            Age:          74y                   Height:       170.18 cm / 67.0 in  Weight:       76.2 kg / 167.9 lbs  Sex:          M                     BSA:          1.88  Room#:        OP                        Sonographer:  Purnima Aragon [facility]  Referring:    JAZMYN  Reading:      Stanley Thomas MD, [facility], [facility]  Primary:      Galindo Flor MD  ______________________________________________________________________    Transthoracic Echocardiogram    Indication:  AFIB,FATIGUE    Conclusions  1. The study quality is good.   2. The left ventricular chamber size is normal.  3. Moderate concentric left ventricular hypertrophy is observed.  4. The estimated ejection fraction is 55-60%.   5. The left atrium is moderately dilated.  6. Moderate aortic leaflet calcification is visualized.  7. There is mild aortic stenosis.  8. There is mild aortic regurgitation.   9. There is mitral annular calcification.  10. There is mild mitral regurgitation.   11. There is mild tricuspid regurgitation.  12. The right ventricular systolic pressure is calculated at 33 mmHg.   13. There is no evidence of pericardial effusion.    Findings       Procedure Info:  The study quality is good.      Left Ventricle:  The left ventricular chamber size is normal. Moderate concentric left  ventricular hypertrophy is  observed. Global left ventricular wall motion  and contractility are within normal limits. There is normal left  ventricular systolic function. The estimated ejection fraction is  55-60%.      Left Atrium:  The left atrium is moderately dilated.     Right Ventricle:  The right ventricular global systolic function is normal.     Right Atrium:  The right atrial cavity size is normal. No atrial septal defect is  visualized.     Aortic Valve:  The aortic valve is trileaflet. Moderate aortic leaflet calcification is  visualized. There is mild aortic regurgitation.  There is mild aortic  stenosis.     Mitral Valve:  There is mitral annular calcification. The mitral valve leaflets are  mildly thickened. There is no evidence of mitral valve prolapse. There  is mild mitral regurgitation.  There is no evidence of mitral stenosis.      Tricuspid Valve:  The tricuspid valve leaflets are normal.  There is mild tricuspid  regurgitation. The right ventricular systolic pressure is calculated at  33 mmHg.      Pulmonic Valve:  The pulmonic valve appears normal. There is a trace pulmonic  regurgitation.      Pericardium:  There is no evidence of pericardial effusion.     Aorta:  There is no dilatation of the aortic root.     Pulmonary Artery:  The main pulmonary artery appears normal.     Venous:  The venous system appears normal.     Measurements   Chambers  Name                    Value           IVSd (2D)               1.44 cm         LVIDd (2D)              4.53 cm         LVIDs (2D)              3.16 cm         LVPWd (2D)              1.31 cm         EF (2D)                 57.7 %          Ao root diameter (2D)   3.3 cm          LA dimension (AP) 2D    5.5 cm          LA:Ao ratio (2D)        1.67 ratio        Aortic Valve  Name                    Value           AV Vmax                 2.02 m/sec      AV VTI                  34.2 cm         AV peak gradient        16 mmHg         AV mean gradient        10 mmHg         LVOT  diameter           2.1 cm          LVOT Vmax               0.81 m/sec      LVOT VTI                14.1 cm         LVOT peak gradient      3 mmHg          LVOT mean gradient      1 mmHg          SV LVOT                 49 ml           STEPHAN (continuity Vmax)   1.39 cm2        STEPHAN (continuity VTI)    1.43 cm2        AR PHT                  609 msec        AR peak gradient        88 mmHg           Tricuspid Valve  Name                    Value           TR Vmax                 2.41 m/sec      TR peak gradient        23 mmHg         RAP                     10 mmHg         RVSP                    33 mmHg           Pulmonic Valve/Qp:Qs  Name                    Value           PV acceleration time    151 msec          Electronically signed by: Stanley Thomas MD, [facility], [facility] on 09/08/2015  17:51:22       Assessment/Plan   Assessment / Plan     Active Hospital Problems    Diagnosis POA   • **Syncope [R55] Yes     Priority: High   • Elevated troponin [R74.8] Yes     Priority: High   • COPD (chronic obstructive pulmonary disease) (CMS/[facility]) [J44.9] Yes     Priority: Medium   • PAF (paroxysmal atrial fibrillation) (CMS/[facility]) [I48.0] Yes     Priority: Medium   • Late onset Alzheimer's disease without behavioral disturbance [G30.1, F02.80] Yes     Priority: Low   • Peripheral neuropathy [G62.9] Yes     Priority: Low   • DDD (degenerative disc disease), cervical [M50.30] Yes     Priority: Low   • Generalized weakness [R53.1] Yes     Priority: Low         1. Syncope:  - vasovagal vs cardiogenic  - continue to trend cardiac enzymes. Obtain EKG and echocardiogram  - CT head without contrast  - CT Pelvic  - PT/OT treat and eval  - orthostatics  - am labs    2. Elevated troponin:  - continue to trend  - no history of CAD  - consult to cardiology. Appreciate input    3. Atrial fibrillation:  - currently stable and rate controlled. Monitor.  On Eliquis. Continue.     4. DM2:  - with neuropathy. Now stable and diet controlled.    5.  COPD:  - controlled and stable. Not on supplemental oxygen. Smoker in the past  - duo nebs with additional pulmonary toilet as needed    DVT prophylaxis:  Eliquis    CODE STATUS:  Full code  Code Status and Medical Interventions:   Ordered at: 05/15/19 0309     Level Of Support Discussed With:    Patient     Code Status:    CPR     Medical Interventions (Level of Support Prior to Arrest):    Full       Admission Status:  I believe this patient meets INPATIENT status due to the need for care which can only be reasonably provided in an hospital setting elevated trop and fall resulting in right hip pain.  In such, I feel patient’s risk for adverse outcomes and need for care warrant INPATIENT evaluation and predict the patient’s care encounter to likely last beyond 2 midnights.      Electronically signed by Ronnie Villagomez PA-C, 05/15/19, 3:13 AM.        Brief Attending Admission Attestation     I have seen and examined the patient, performing an independent face-to-face diagnostic evaluation with plan of care reviewed and developed with the advanced practice clinician (APC).      Brief Summary Statement:   Gibson Mansfield is a 78 y.o. male with past medical history of COPD, degenerative disc disease of the cervical spine, type 2 diabetes mellitus, Alzheimer's dementia, paroxysmal atrial fibrillation and peripheral neuropathy.  Patient was taken to [facility] emergency department after his wife noted he had fallen in the floor in the bathroom.  Patient states that he slid down to the floor.  At the ED on the outside hospital patient was noted to have shortening and external rotation of the right lower extremity.  Patient will be admitted we will consult cardiology due to increase in troponin.  We will also obtain CT of the pelvis to rule out an occult fracture.    Remainder of detailed HPI is as noted above and has been reviewed and/or edited by me for completeness.      Attending Physical Exam:  Vitals:     05/15/19 0434   BP: 124/71   Pulse: 78   Resp: 18   Temp: 97.5 °F (36.4 °C)   SpO2: 94%     Gen-NAD  HEENT-atraumatic, normocephalic, PERRLA, EOMI, moist mucous membranes   CV-irregularly irregular with systolic ejection murmur noted  Resp-CTAB, no rales, no rhonchi, no wheezing  Abd-normal BS, soft, ND, NT   Ext-no edema or clubbing, pedal pulses intact, also tender over the right greater trochanter, pain with flexion of the right hip.  Skin-warm, dry, no rashes or lesions noted  Neuro-alert, pleasant, orientated to person, place and time, CN II-XII grossly intact, equal strength in upper and lower extremities  Psych-appropriate mood and behavior      Brief Assessment/Plan :  See above for further detailed assessment and plan developed with APC which I have reviewed and/or edited for completeness.      Electronically signed by Silvia Weber DO, 05/15/19, 5:45 AM.

## 2019-05-15 NOTE — PLAN OF CARE
Problem: Fall Risk (Adult)  Goal: Identify Related Risk Factors and Signs and Symptoms  Outcome: Ongoing (interventions implemented as appropriate)   05/15/19 0440   Fall Risk (Adult)   Related Risk Factors (Fall Risk) age-related changes;gait/mobility problems   Signs and Symptoms (Fall Risk) presence of risk factors     Goal: Absence of Fall  Outcome: Ongoing (interventions implemented as appropriate)   05/15/19 0440   Fall Risk (Adult)   Absence of Fall achieves outcome       Problem: Pain, Acute (Adult)  Goal: Identify Related Risk Factors and Signs and Symptoms  Outcome: Ongoing (interventions implemented as appropriate)   05/15/19 0440   Pain, Acute (Adult)   Related Risk Factors (Acute Pain) positioning   Signs and Symptoms (Acute Pain) alteration in muscle tone     Goal: Acceptable Pain Control/Comfort Level  Outcome: Ongoing (interventions implemented as appropriate)   05/15/19 0440   Pain, Acute (Adult)   Acceptable Pain Control/Comfort Level achieves outcome

## 2019-05-15 NOTE — NURSING NOTE
ACC REVIEW REPORT: [facility]        PATIENT NAME: Gibson Mansfield    PATIENT ID: [id number]    BED: S349    BED TYPE: tele    BED GIVEN TO: ***    TIME BED GIVEN: ***    YOB: 1940    AGE: 78    GENDER: male    PREVIOUS ADMIT TO [facility]: yes    PREVIOUS ADMISSION DATE:     PATIENT CLASS: inpatient     TODAY'S DATE: 5/14/2019    TRANSFER DATE: 5/14/19    ETA: ***    TRANSFERRING FACILITY: [facility]    TRANSFERRING FACILITY PHONE # : [phone]    TRANSFERRING MD: Candelaria    DATE/TIME REQUEST RECEIVED: 5/14 2221    [facility] RN: Anabella Smith RN    REPORT FROM: ***    TIME REPORT TAKEN: ***    DIAGNOSIS: elevated troponin, syncopal episode    REASON FOR TRANSFER TO [facility]: Higher level of care    TRANSPORTATION: local ground    CLINICAL REASON FOR TRANSFER TO [facility]: higher level of care      CLINICAL INFORMATION    HEIGHT: ***    WEIGHT: ***    ALLERGIES: ***    FARAH: ***    INFECTIOUS DISEASE: ***    ISOLATION: ***    VITAL SIGNS:   TIME: ***  TEMP: ***  PULSE: ***  B/P: ***  RESP: ***    LAST VITAL SIGNS:  TIME: ***  TEMP: ***  PULSE: ***  B/P: ***  RESP: ***    LAB INFORMATION: ***    CULTURE INFORMATION: ***    MEDS/IV FLUIDS: ***      CARDIAC SYSTEM:    CHEST PAIN: ***    RATE: ***    SCALE: ***    RHYTHM: ***    Is patient taking or has patient been given any drugs that could increase bleeding? ***  (Plavix, Brilinta, Effient, Eliquis, Xarelto, Warfarin, Integrilin, Angiomax)    DRUG: ***     DOSE/FREQUENCY: ***    CARDIAC ENZYMES:    DATE: ***  TIME: ***  CK: ***  CKMB: ***  JUJU: ***  TROP: ***    DATE: ***  TIME: ***  CK: ***  CKMB: ***  JUJU: ***  TROP: ***    CARDIAC NOTES: ***      RESPIRATORY SYSTEM:    LUNG SOUNDS:    CLEAR: ***  CRACKLES: ***  WHEEZES: ***  RHONCHI: ***  DIMINISHED: ***    ON VENTILATOR:    TV: ***  FI02: ***  RATE: ***  PEEP: ***    OXYGEN: ***    O2 SAT: ***    ADMINISTRATION ROUTE: ***    IMAGING FINDINGS: ***    PNEUMO LOCATION: ***    PNEUMO SIZE:  ***    PNEUMO CHEST TUBE SEAL TYPE: ***    RADIOLOGY RESULTS: ***    RESPIRATORY STATUS: ***      CNS/MUSCULOSKELETAL    ALERT AND ORIENTED:    PERSON: ***  PLACE: ***  TIME: ***    INJURY:  WHERE: ***    JAMAL COMA SCALE:    E: ***  M: ***  V: ***    STROKE SCALE: ***    SIZE OF HEMORRHAGE: ***    SYMPTOMS: (CHOOSE APPROPRIATE)    ASPHASIA: ***  ATAXIA: ***  DYSARTHRIA: ***  DYSPHASIA: ***  HEADACHE: ***  PARALYSIS: ***  SEIZURE: ***  SYNCOPE: ***  VERTIGO: ***  VISION CHANGE:  ***      EXTREMITY WEAKNESS:    LEFT ARM: ***  RIGHT ARM: ***  LEFT LEG: ***  RIGHT LEG: ***    CAT SCAN RESULTS: ***    MRI RESULTS: ***    CNS/MUSCULOSKELETAL NOTES: ***      GI//GY      ABDOMINAL PAIN: ***    VOMITING: ***    DIARRHEA: ***    NAUSEA: ***    BOWEL SOUNDS: ***    OCCULT STOOL: ***    VAGINAL BLEEDING: ***    TESTICULAR PAIN: ***    HEMATURIA: ***    NG TUBE:    SIZE: ***  DATE INSERTED: ***      ULTRASOUND: ***    ULTRASOUND RESULTS: ***      ACUTE ABDOMEN: ***    ACUTE ABDOMEN RESULTS: ***      CT SCAN: ***    CT SCAN RESULTS: ***      GI//GY NOTES: ***    PAST MEDICAL HISTORY: ***    OTHER SYMPTOM NOTES: ***    ADDITIONAL NOTES: ***          Deann Smith RN  5/14/2019  11:04 PM

## 2019-05-15 NOTE — PROGRESS NOTES
Patient seen and examined.  Please refer to the history and physical dated today for full details.  Patient is an 78-year-old male who was right leg gave out as he was getting up from the toilet.  No syncope or loss of consciousness.  Had some pain in the leg afterwards, negative x-rays at the other facility.  Had a very mildly elevated troponin and was transferred here for further evaluation.  Work-up here has shown a negative CT pelvis with no evidence of fracture and he has already been up ambulating today with walker.  Troponin is minimally elevated 0.09.  Echocardiogram done with an EF of less than 35% which is a new finding.  Dr. Thomas from cardiology final recommendations pending.  Discussed with wife and patient they feel he is doing well and should be able to go home at discharge.  Await therapy evaluations.    Electronically signed by Ruperto Ivory MD, 05/15/19, 10:39 AM.

## 2019-05-16 PROBLEM — I42.9 CARDIOMYOPATHY (HCC): Status: ACTIVE | Noted: 2019-01-01

## 2019-05-16 NOTE — TELEPHONE ENCOUNTER
----- Message from Jenna Araiza sent at 5/15/2019  3:43 PM EDT -----  Regarding: PT & OT PLAN OF CARE  MICHELLE FISHMAN [phone] [facility] WOULD LIKE A PLAN OF CARE FOR PT AND OT     PLEASE CALL HER

## 2019-05-16 NOTE — DISCHARGE SUMMARY
[facility] Hospital Medicine Services  DISCHARGE SUMMARY    Patient Name: Gibson Mansfield  : 1940  MRN: [id number]    Date of Admission: 5/15/2019  Date of Discharge:  19  Primary Care Physician: Samson Richard MD    Consults     Date and Time Order Name Status Description    5/15/2019 0305 Inpatient Cardiology Consult Completed           Hospital Course     Presenting Problem:   Elevated troponin [R74.8]  Elevated troponin [R74.8]  Elevated troponin [R74.8]    Active Hospital Problems    Diagnosis  POA   • **Fall [W19.XXXA]  Yes     Priority: Medium   • Elevated troponin [R74.8]  Yes     Priority: Medium   • DM2 (diabetes mellitus, type 2) (CMS/HCC) [E11.9]  Yes     Priority: Medium   • Cardiomyopathy (CMS/HCC) [I42.9]  Yes   • COPD (chronic obstructive pulmonary disease) (CMS/HCC) [J44.9]  Yes   • Late onset Alzheimer's disease without behavioral disturbance [G30.1, F02.80]  Yes   • Peripheral neuropathy [G62.9]  Yes   • DDD (degenerative disc disease), cervical [M50.30]  Yes   • Generalized weakness [R53.1]  Yes   • PAF (paroxysmal atrial fibrillation) (CMS/HCC) [I48.0]  Yes      Resolved Hospital Problems   No resolved problems to display.          Hospital Course:  Gibson Mansfield is a 78 y.o. male with a history of paroxysmal A. fib on Eliquis, COPD, hypertension, hyperlipidemia, dementia who presents from outside hospital after having a fall when getting off the toilet.  On further questioning he denies any loss of consciousness but does says his leg gave out from under him.  Initial x-rays did not show any fracture however he was still complaining of pain with weightbearing.  He was transferred to [facility] for further evaluation.  Here CT of his pelvis did not show any evidence of fracture and he worked with physical therapy and is able to get up using a walker and bear weight.  He had very minimally elevated troponins with stable EKGs and no evidence of chest  pain.  Echocardiogram was done which did show an EF of less than 35% which is a new finding.  He was seen by Dr. Thomas his cardiologist who recommended adding low-dose beta-blocker and ARB and to recheck his ejection fraction in a few weeks.  Currently due to his age and functional status he is not felt to be a candidate for a heart catheterization or a LifeVest but will follow up as an outpatient for further discussions.    At the day of discharge patient and his wife feel he is a essentially at baseline.  Case management has seen and arranging some home health and some home equipment such as a bedside commode and gait belt.  I would like him to follow-up with primary care physician within 1 week for close hospital follow-up.  He also follow-up with Dr. Thomas per his instructions.        Day of Discharge     HPI:   No problems overnight.  Patient and his wife feel that he is at baseline.      Review of Systems  Gen- No fevers, chills  CV- No chest pain, palpitations  Resp- No cough, dyspnea  GI- No N/V/D, abd pain    Otherwise ROS is negative except as mentioned in the HPI.    Vital Signs:   Temp:  [97.4 °F (36.3 °C)-98.5 °F (36.9 °C)] 98.3 °F (36.8 °C)  Heart Rate:  [77-86] 86  Resp:  [18-20] 20  BP: ()/(66-99) 144/75     Physical Exam:  Constitutional: No acute distress, awake, alert, up on bedside commode  HENT: NCAT, mucous membranes moist  Respiratory: Clear to auscultation bilaterally, respiratory effort normal   Cardiovascular: RRR, no murmurs, rubs, or gallops  Gastrointestinal: Positive bowel sounds, soft, nontender, nondistended  Musculoskeletal: No bilateral ankle edema  Psychiatric: Appropriate affect, cooperative  Neurologic: Oriented x 3, no focal deficits  Skin: No rashes      Pertinent  and/or Most Recent Results     Results from last 7 days   Lab Units 05/15/19  0331   WBC 10*3/mm3 7.44   HEMOGLOBIN g/dL 11.8*   HEMATOCRIT % 37.4*   PLATELETS 10*3/mm3 112*   SODIUM mmol/L 144   POTASSIUM  mmol/L 4.0   CHLORIDE mmol/L 108*   CO2 mmol/L 22.0   BUN mg/dL 16   CREATININE mg/dL 1.00   GLUCOSE mg/dL 110*   CALCIUM mg/dL 9.4     Results from last 7 days   Lab Units 05/15/19  0331   BILIRUBIN mg/dL 0.6   ALK PHOS U/L 83   ALT (SGPT) U/L 15   AST (SGOT) U/L 20           Invalid input(s): TG, LDLCALC, LDLREALC  Results from last 7 days   Lab Units 05/15/19  0719 05/15/19  0331   TROPONIN T ng/mL 0.098* 0.094*       Brief Urine Lab Results     None          Microbiology Results Abnormal     None          Imaging Results (all)     Procedure Component Value Units Date/Time    XR Chest PA & Lateral [[id number]] Collected:  05/15/19 1416     Updated:  05/15/19 1522    Narrative:       EXAMINATION: XR CHEST PA AND LATERAL- 05/15/2019      INDICATION: new admit; R74.8-Abnormal levels of other serum enzymes;  W19.XXXA-Unspecified fall, initial encounter      COMPARISON: 08/18/2015     FINDINGS: PA and lateral views of the chest reveal cardiac and  mediastinal silhouettes to be within normal limits. Underlying chronic  and emphysematous changes seen within the lung fields bilaterally.  Degenerative changes seen within the spine. No pleural effusion or  pneumothorax.           Impression:       Chronic changes seen in the lung fields with no evidence of  acute parenchymal disease.       D:  05/15/2019  E:  05/15/2019     This report was finalized on 5/15/2019 3:19 PM by Dr. Becki Toscano MD.       CT Pelvis Without Contrast [[id number]] Collected:  05/15/19 0335     Updated:  05/15/19 0337    Narrative:       CT Pelvis WO    INDICATION:   Syncopal episode with subsequent fall tonight. Landed on right hip. Right hip pain.    TECHNIQUE:   CT of the pelvis without contrast. Coronal and sagittal reconstructions were obtained.  Radiation dose reduction techniques included automated exposure control or exposure modulation based on body size. Count of known CT and cardiac nuc med studies  performed in previous 12 months:  1.     COMPARISON:   None available.    FINDINGS:  No acute fracture. Minimal arthritic changes both hips. Moderately advanced multilevel degenerative disc disease and facet arthropathy lower lumbar spine. Diffuse aortic and iliac atherosclerotic disease without significant aneurysm. Visualized GI tract  and bladder unremarkable. Right posterior gluteal soft tissue calcification may represent area of fat necrosis. Nonspecific induration left posterior gluteal soft tissues.      Impression:       No acute fracture or deformity. Mild arthritic changes both hips.          Signer Name: HANANE Rolle MD   Signed: 5/15/2019 3:35 AM   Workstation Name: [id number]       CT Head Without Contrast [[id number]] Collected:  05/15/19 0323     Updated:  05/15/19 0326    Narrative:       CT Head WO    HISTORY:   Syncopal episode with subsequent fall tonight. Prior CVA with residual right deficit.    TECHNIQUE:   Axial unenhanced head CT. Radiation dose reduction techniques included automated exposure control or exposure modulation based on body size. Count of known CT and cardiac nuc med studies performed in previous 12 months: 0.     Time of scan: 0310 hours    COMPARISON:   Noncontrast head CT 2/5/2018 and MRI the brain 3/20/2018    FINDINGS:   No acute intracranial hemorrhage, mass, or infarct. No hydrocephalus or extra-axial fluid collection. Mild cerebral atrophy with prominence of ventricles, sulci and basilar cisterns. Chronic lacunar infarct anterior right basal ganglia. Diffuse decreased  attenuation of periventricular white matter most compatible chronic microvascular disease. The skull base, calvarium, and extracranial soft tissues are normal.      Impression:       No acute intracranial abnormality identified.    Generalized atrophy with diffuse chronic periventricular white matter change and old right basal ganglia lacunar infarct.          Signer Name: HANANE Rolle MD   Signed: 5/15/2019 3:23 AM   Workstation Name:  "[email or web address]             Results for orders placed in visit on 12/28/18   SCANNED VASCULAR STUDIES       Results for orders placed in visit on 12/28/18   SCANNED VASCULAR STUDIES       Results for orders placed during the hospital encounter of 05/15/19   Adult Transthoracic Echo Complete W/ Cont if Necessary Per Protocol    Narrative · Left atrial enlargement is seen.  · There is severe apical hypokinesia with an estimated ejection fraction   of <35%.  · There are fibrocalcific changes in the aortic valve that are associated   with mild-moderate \"mixed\" AS/AI.  · There is mitral annular calcification with mild-moderate mitral   regurgitation.  · The estimated pulmonary artery pressure is normal.            Discharge Details        Discharge Medications      New Medications      Instructions Start Date   carvedilol 3.125 MG tablet  Commonly known as:  COREG   3.125 mg, Oral, Every 12 Hours Scheduled      losartan 50 MG tablet  Commonly known as:  COZAAR   50 mg, Oral, Every 24 Hours Scheduled         Continue These Medications      Instructions Start Date   apixaban 5 MG tablet tablet  Commonly known as:  ELIQUIS   5 mg, Oral, 2 Times Daily      folic acid 1 MG tablet  Commonly known as:  FOLVITE   1 mg, Oral, Daily      memantine 10 MG tablet  Commonly known as:  NAMENDA   10 mg, Oral, Daily      pravastatin 80 MG tablet  Commonly known as:  PRAVACHOL   80 mg, Oral, Daily      PRESERVISION AREDS 2 PO   Oral, Daily, As directed      raNITIdine 150 MG tablet  Commonly known as:  ZANTAC   150 mg, Oral, Nightly      rivastigmine 4.6 MG/24HR patch  Commonly known as:  EXELON   PLACE 1 PATCH ON THE SKIN  AS DIRECTED BY PROVIDER    DAILY      vitamin B-12 1000 MCG tablet  Commonly known as:  CYANOCOBALAMIN   1,000 mcg, Oral, Every Other Day      VITAMIN D-3 PO   Oral, Daily, As directed             Allergies   Allergen Reactions   • Penicillins    • Sulfa Antibiotics          Discharge Disposition:  Home or Self " Care    Discharge Diet:  Diet Order   Procedures   • Diet Regular; Cardiac         Discharge Activity:   Activity Instructions     Activity as Tolerated              CODE STATUS:    Code Status and Medical Interventions:   Ordered at: 05/15/19 0309     Level Of Support Discussed With:    Patient     Code Status:    CPR     Medical Interventions (Level of Support Prior to Arrest):    Full         Future Appointments   Date Time Provider Department Center   7/17/2019  3:00 PM Anitha Haines APRN [facility] None   12/19/2019  1:45 PM Stanley Thomas MD [facility] None       Additional Instructions for the Follow-ups that You Need to Schedule     Ambulatory Referral to Home Health   As directed      Face to Face Visit Date:  5/15/2019    Follow-up Provider for Plan of Care?:  I treated the patient in an acute care facility and will not continue treatment after discharge.    Follow-up Provider:  ANDREWS RIDLEY [[id number]]    Reason/Clinical Findings:  Fall and leg pain    Describe mobility limitations that make leaving home difficult:  Fall and leg pain    Nursing/Therapeutic Services Requested:  Physical Therapy Occupational Therapy    PT orders:  Gait Training Strengthening Home safety assessment    Weight Bearing Status:  Full Weight Bearing    Occupational orders:  Home safety assessment Activities of daily living Strengthening    Frequency:  1 Week 1         Discharge Follow-up with PCP   As directed       Currently Documented PCP:    Andrews Ridley MD    PCP Phone Number:    [phone]     Follow Up Details:  PCP 1 week         Discharge Follow-up with Specified Provider: Dr. Thomas per his instructions   As directed      To:  Dr. Thomas per his instructions               Time Spent on Discharge:  35 minutes    Electronically signed by Ruperto Ivory MD, 05/16/19, 8:11 AM.

## 2019-05-16 NOTE — PLAN OF CARE
Problem: Fall Risk (Adult)  Goal: Absence of Fall  Outcome: Ongoing (interventions implemented as appropriate)   05/16/19 0643   Fall Risk (Adult)   Absence of Fall achieves outcome       Problem: Patient Care Overview  Goal: Plan of Care Review  Outcome: Ongoing (interventions implemented as appropriate)   05/16/19 0643   Plan of Care Review   Progress improving   Coping/Psychosocial   Plan of Care Reviewed With patient;spouse     Goal: Individualization and Mutuality  Outcome: Ongoing (interventions implemented as appropriate)    Goal: Discharge Needs Assessment  Outcome: Ongoing (interventions implemented as appropriate)    Goal: Interprofessional Rounds/Family Conf  Outcome: Ongoing (interventions implemented as appropriate)

## 2019-05-16 NOTE — PROGRESS NOTES
Case Management Discharge Note    Final Note: Notified Lexie at [facility] that ladan is being discharged. She said she has all the paperwork she needs.     Destination      No service has been selected for the patient.      Durable Medical Equipment      No service has been selected for the patient.      Dialysis/Infusion      No service has been selected for the patient.      Home Medical Care      No service has been selected for the patient.      Therapy      No service has been selected for the patient.      Community Resources      No service has been selected for the patient.             Final Discharge Disposition Code: 06 - home with home health care

## 2019-05-16 NOTE — NURSING NOTE
"Patient Name:  Gibson Mansfield  :  1940  DOS:  19    Active Hospital Problems    Diagnosis   • **Fall   • Cardiomyopathy (CMS/HCC)   • Elevated troponin   • COPD (chronic obstructive pulmonary disease) (CMS/[facility])   • DM2 (diabetes mellitus, type 2) (CMS/[facility])   • Late onset Alzheimer's disease without behavioral disturbance   • Peripheral neuropathy   • DDD (degenerative disc disease), cervical   • Generalized weakness   • PAF (paroxysmal atrial fibrillation) (CMS/[facility])       Consult has been received.  Medical records have been reviewed.      · Echo Results:Left atrial enlargement is seen.  · There is severe apical hypokinesia with an estimated ejection fraction of <35%.  · There are fibrocalcific changes in the aortic valve that are associated with mild-moderate \"mixed\" AS/AI.  · There is mitral annular calcification with mild-moderate mitral regurgitation.  The estimated pulmonary artery pressure is normal.  NYHA Class: III    Heart Failure Quality Measures    ACE I, ARB, ARNI (if EF <40%)     Yes      Evidence-based Beta Blocker (EF<40%)    (Bisoprolol, Carvedilol, Metoprolol Succinate)  Yes      Aldosterone Antagonist (EF <40%)  If no contraindications:  Other:  defer to cards    If EF < 35%  Not an optimal med treatment > 3 months (BB, ACE / ARB / ARNI)    Atrial fibrillation / Atrial flutter:  Quality Measure    CHADS-VASc Score:  Anticoagulation for CHADS-VASc >/=2    Yes      Statin Therapy (for patients with a history of CAD, CVA/TIA, PVD, DM)  Yes        "

## 2019-05-16 NOTE — PROGRESS NOTES
"  [facility] at [facility]  PROGRESS NOTE    Date of Admission: 5/15/2019  Length of Stay: 1  Primary Care Physician: Samson Richard MD    Chief Complaint: f/u fall, PAF, CM  Problem List:   1. Paroxysmal atrial fibrillation:  a. Symptoms of fatigue over the last month with EKG on 08/06/2015 revealing atrial fibrillation with controlled rate.  b. CHADS-VASc = 4.   c. Apixaban therapy instituted.  d. Echo September 2015: EF 55-60%, moderate aortic calcification, mild AI  e. 24-hour Holter, A-fib rate  bpm, rate>120 (intermittent) 17 of 24 hours (6/2017)  f. All symptoms abated after discontinuation of beta blocker.  g. Echo 5/15/19: LA enlargement; severe apical hypokinesia with EF <35%, fibrocalcific aortic valve with mild-moderate \"mixed\" AS/AI, mild-mod MR  2. Hypertension.   3. Hyperlipidemia.   4. Diabetes mellitus type 2, diet controlled.   5. Osteoarthritis.   6. Prostate cancer:  a. “Watch and wait” by primary care.   7. History of PUD.   8. Dementia.  9. History of remote tobacco abuse  10. Peripheral neuropathy  11. Hard of hearing   12. Lower extremity weakness and falls  2018  a. Negative NS evaluation  b. Negative Neurology evaluation 02/2019   c. ROXANN's 12/2018: Right =0.95; left is abnormal with significant inflow (aorto-iliac) and infrainguinal arterial occlusive disease   d. Fall, May 2019 with subsequent hospitalization       Subjective      Patient remains asymptomatic and denies angina, dyspnea, palpitations. States he is ready to go home     Objective   Vitals: /90 (BP Location: Right arm, Patient Position: Sitting)   Pulse 86   Temp 98.3 °F (36.8 °C) (Oral)   Resp 20   Ht 170.2 cm (67\")   Wt 78.9 kg (174 lb)   SpO2 95%   BMI 27.25 kg/m²     Physical Exam:  GENERAL: Alert, cooperative, in no acute distress.   HEENT: Normocephalic, no jugular venous distention  HEART: No discrete PMI is noted. Irregular rhythm, normal rate, and 3/6 systolic murmur, no " "gallops, or rubs.   LUNGS: Clear to auscultation bilaterally. No wheezing, rales or rhonchi.  ABDOMEN: Soft, bowel sounds present, non-tender   NEUROLOGIC: No focal abnormalities involving strength or sensation are noted.   EXTREMITIES: No clubbing, cyanosis, or edema noted.     Results:  Results from last 7 days   Lab Units 05/15/19  0331   WBC 10*3/mm3 7.44   HEMOGLOBIN g/dL 11.8*   HEMATOCRIT % 37.4*   PLATELETS 10*3/mm3 112*     Results from last 7 days   Lab Units 05/15/19  0331   SODIUM mmol/L 144   POTASSIUM mmol/L 4.0   CHLORIDE mmol/L 108*   CO2 mmol/L 22.0   BUN mg/dL 16   CREATININE mg/dL 1.00   GLUCOSE mg/dL 110*      Lab Results   Component Value Date    CHOL 159 05/17/2018    TRIG 101 05/17/2018    HDL 55 05/17/2018    LDL 95 05/17/2018    AST 20 05/15/2019    ALT 15 05/15/2019         Results from last 7 days   Lab Units 05/15/19  0719 05/15/19  0331   TROPONIN T ng/mL 0.098* 0.094*       Intake/Output Summary (Last 24 hours) at 5/16/2019 0852  Last data filed at 5/16/2019 0757  Gross per 24 hour   Intake 240 ml   Output 100 ml   Net 140 ml       I personally reviewed the patient's EKG/Telemetry data    Radiology Data:   CXR 5/15/2019:  IMPRESSION:  Chronic changes seen in the lung fields with no evidence of  acute parenchymal disease.       Echo 5/15/2019:  Interpretation Summary     · Left atrial enlargement is seen.  · There is severe apical hypokinesia with an estimated ejection fraction of <35%.  · There are fibrocalcific changes in the aortic valve that are associated with mild-moderate \"mixed\" AS/AI.  · There is mitral annular calcification with mild-moderate mitral regurgitation.  · The estimated pulmonary artery pressure is normal.     Current Medications:    apixaban 5 mg Oral Q12H   atorvastatin 20 mg Oral Nightly   carvedilol 3.125 mg Oral Q12H   famotidine 20 mg Oral Daily   losartan 50 mg Oral Q24H   memantine 10 mg Oral Daily   rivastigmine 1 patch Transdermal Daily   sodium chloride 3 " mL Intravenous Q12H          Assessment and Plan:     1. Elevated troponin  - no EKG changes and patient denies angina; DOES NOT meet 5th Intl Definition of MI.  - Echo with EF <35%, and mild-mod mixed AS and AI  - At this time he is hypertensive with EF<35%; I would try ACEI or ARB and recheck EF in a few weeks; NOT a candidate for cath or LifeVest now but would like to see response to vasodilator and d/w family before final decision.  - patient remains stable and asymptomatic from cardiac perspective; tolerating new medicines     2. Afib   - asymptomatic, and rate controlled  - on Eliquis for stroke prevention      3. LE weakness/frequent falls  - significant evaluation in the past year from NS (Dr. Sotelo), Vascular surgery (Dr. Chowdhury) and Neurology (Dr. Romano)   - moderate peripheral neuropathy and abnormal LEFT ROXANN  - It's the right that is symptomatic.     4. Alzheimer's dementia   - on Namenda and Exelon patch     Disposition: Patient is discharged per hospitalist team. Will have him return to see Dr. Thomas in 1 month with repeat echo for LV function. Will discuss further recommendations at that time. Discussed with patient and wife in room who are in agreement.     Electronically signed by Belinda Valencia PA-C, 05/16/19, 9:00 AM.

## 2019-05-17 NOTE — OUTREACH NOTE
DOMINIQUE call completed.  Please refer to TCM call flowsheet for call documentation.  Mrs. Mansfield requests a wheelchair ordered for patient so that he may travel to his appointments more easily.  She denies n/v/d/c, pain, and questions regarding meds and d/c instructions from hospital.  Appointment confirmed.  Please contact Mrs. Mansfield about wheelchair order.

## 2019-05-17 NOTE — PROGRESS NOTES
Can pt get an order for a wheel chair, medicare will pay for it if we fax RX, his wife is having a hard time being able to get him places and he isnt able to walk for long periods, please advise

## 2019-05-18 NOTE — OUTREACH NOTE
Prep Survey      Responses   Facility patient discharged from?  [facility]   Is patient eligible?  Yes   Discharge diagnosis  Elevated troponin FALL   Does the patient have one of the following disease processes/diagnoses(primary or secondary)?  Other   Does the patient have Home health ordered?  Yes   What is the Home health agency?   [facility]    Is there a DME ordered?  No   Prep survey completed?  Yes          Edwige Hendrickson RN

## 2019-05-20 NOTE — OUTREACH NOTE
Medical Week 1 Survey      Responses   Facility patient discharged from?  [facility]   Does the patient have one of the following disease processes/diagnoses(primary or secondary)?  Other   Is there a successful TCM telephone encounter documented?  Yes          Coral Garza RN

## 2019-05-21 NOTE — PROGRESS NOTES
"Transitional Care Follow Up Visit  Subjective     Gibson Bouchra Mansfield is a 78 y.o. male who presents for a transitional care management visit.  Within 48 business hours after discharge our office contacted him via telephone to coordinate his care and needs.  I reviewed and discussed the details of that call along with the discharge summary, hospital problems, inpatient lab results, inpatient diagnostic studies, and consultation reports with Gibson.  Current outpatient and discharge medications have been reconciled for the patient.    Date of TCM Phone Call 5/17/2019   [facility]   Date of Discharge 5/16/2019   Discharge Disposition Home or Self Care     Risk for Readmission (LACE) Score: 8 (5/16/2019  6:00 AM)    History of Present Illness   Course During Hospital Stay:  He is accompanied by his wife.  He was admitted after a fall in his bathroom with elevated troponin levels.  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  Cardiology saw the patient.  An echo identified apical hypokinesia with EF 35% and AI/AS findings.  He received both PT and OT consults and recommendations were made to improve strengthening and ambulation.  He was discharged home with home health, PT & ongoing OT evaluations.  Since discharge he has done well.  He voices no acute symptoms today.  He describes increased strengthening.  He is up to \"162\" of walking per day.  He uses a rolling walker.  He came from the parking lot to his exam room today without difficulty.  He plans to continue strengthening with PT & OT.  He plans to follow up with his cardiologist.     Review of Systems   Constitutional: Negative for chills and fever.   HENT: Positive for hearing loss (hard of hearing). Negative for trouble swallowing.    Eyes: Negative for visual disturbance.   Respiratory: Negative for cough and shortness of breath.    Cardiovascular: Negative for chest pain, palpitations and leg swelling. "   Gastrointestinal: Negative for abdominal pain, diarrhea, nausea and vomiting.   Genitourinary: Negative for difficulty urinating.   Musculoskeletal: Positive for back pain and gait problem. Negative for arthralgias.   Skin: Negative for rash.   Neurological: Negative for headaches.   Hematological: Does not bruise/bleed easily.   Psychiatric/Behavioral: Negative for behavioral problems and confusion.   All other systems reviewed and are negative.    Objective   Physical Exam   Constitutional: He is oriented to person, place, and time. He appears well-developed and well-nourished. He is cooperative.   HENT:   Head: Normocephalic and atraumatic.   Right Ear: Decreased hearing is noted.   Left Ear: Decreased hearing is noted.   Mouth/Throat: Mucous membranes are normal.   Hearing aids   Eyes: Conjunctivae and EOM are normal. Pupils are equal, round, and reactive to light.   Neck: Neck supple. No JVD present. No thyromegaly present.   Cardiovascular: Normal rate. An irregular rhythm present.   Murmur heard.  Pulmonary/Chest: Effort normal and breath sounds normal. He has no wheezes.   Musculoskeletal: Normal range of motion.        Cervical back: He exhibits deformity.        Thoracic back: He exhibits deformity.   Neurological: He is alert and oriented to person, place, and time. He has normal strength. No sensory deficit. Gait abnormal. Coordination normal.   Skin: Skin is warm and dry.   Psychiatric: He has a normal mood and affect. His speech is normal and behavior is normal. Judgment and thought content normal. Cognition and memory are normal.   Nursing note and vitals reviewed.      Assessment/Plan   Diagnoses and all orders for this visit:    Falls frequently        - Physical therapy evaluation, strengthening and gait training        - Occupational evaluation for home fall prevention strategies        - Home safety, lighting, wall bars recommended        - Routine calorie counting        - Sleep / wake  hygiene        - Avoid sedating medications in the elderly        - Routine blood pressure monitoring        - We discussed orthostatic changes with standing        - Continue with rolling walker        - Follow up in the Fall for annual fasting medicare wellness

## 2019-05-29 NOTE — OUTREACH NOTE
Medical Week 2 Survey      Responses   Facility patient discharged from?  [place]   Does the patient have one of the following disease processes/diagnoses(primary or secondary)?  Other   Week 2 attempt successful?  No   Unsuccessful attempts  Attempt 1          Mitra Desai RN

## 2019-05-31 NOTE — OUTREACH NOTE
Medical Week 2 Survey      Responses   Facility patient discharged from?  [place]   Does the patient have one of the following disease processes/diagnoses(primary or secondary)?  Other   Week 2 attempt successful?  Yes   Call start time  1240   Discharge diagnosis  Elevated troponin FALL   Call end time  1243   Is patient permission given to speak with other caregiver?  Yes   List who call center can speak with  Daughter   Person spoke with today (if not patient) and relationship  Daughter   Meds reviewed with patient/caregiver?  Yes   Is the patient taking all medications as directed (includes completed medication regime)?  Yes   Has the patient kept scheduled appointments due by today?  Yes   Comments  Pt has several appts coming up   Psychosocial issues?  No   What is the patient's perception of their health status since discharge?  Improving   Is the patient/caregiver able to teach back signs and symptoms related to disease process for when to call PCP?  Yes   Is the patient/caregiver able to teach back signs and symptoms related to disease process for when to call 911?  Yes   Is the patient/caregiver able to teach back the hierarchy of who to call/visit for symptoms/problems? PCP, Specialist, Home health nurse, Urgent Care, ED, 911  Yes   Additional teach back comments  pt doing well with therapy and able to bear weight. He is returning for his F/U appts.   Week 2 Call Completed?  Yes          Monica Ferraro RN

## 2019-06-17 NOTE — PROGRESS NOTES
[facility]      Encounter Date:06/17/2019     Gibson Mansfield  [address] [place] [address]  [unfilled]    1940    Samson Richard MD Lovell Dea Polley is a 78 y.o. male.      Subjective:     Chief Complaint:  Referral from [facility] for SOB    HPI   Patient is an 78-year-old male with past medical history significant for atrial fibrillation, cardiomyopathy, hypertension, hyperlipidemia, diabetes, prostate cancer, dementia, COPD, falls [facility] the [facility] at the request of Dr. Thomas for further evaluation of shortness of breath.  Patient recently admitted 5/15/2019 to 5/16/2019 with fall and mildly elevated troponins.   Echo showed an EF of less than 35% which was a new finding.  Due to age and functional status a left heart cath and LifeVest was deferred. Patient notes increased dyspnea over the past week. Unable to sleep due to orthopnea. Denies wt gain but not weighing himself regularly. States he feels worn out just sitting here today    Patient Active Problem List   Diagnosis   • Prostate cancer (CMS/HCC)   • PAF (paroxysmal atrial fibrillation) (CMS/HCC)   • Generalized weakness   • Ataxia   • Hypersomnolence   • Cerebral microvascular disease   • Late onset Alzheimer's disease without behavioral disturbance   • Peripheral neuropathy   • DDD (degenerative disc disease), cervical   • Elevated troponin   • Fall   • COPD (chronic obstructive pulmonary disease) (CMS/HCC)   • DM2 (diabetes mellitus, type 2) (CMS/HCC)   • Tympanic membrane perforation   • Sensorineural hearing loss of left ear   • Otitis externa   • Mixed hearing loss of right ear   • Cardiomyopathy (CMS/HCC)       Past Medical History:   Diagnosis Date   • Abdominal hernia    • Atrial fibrillation (CMS/HCC)    • Blindness    • Chronic anticoagulation    • COPD (chronic obstructive pulmonary disease) (CMS/HCC)    • Dementia    • GERD (gastroesophageal reflux disease)    • History of  prostate cancer    • History of stroke    • HLD (hyperlipidemia)    • [place] (hard of hearing)    • HTN (hypertension)    • Legally blind    • Macular degeneration of both eyes    • Osteoarthritis of lumbar spine        Past Surgical History:   Procedure Laterality Date   • CATARACT EXTRACTION Bilateral 2016   • COLONOSCOPY     • LUMBAR DISC SURGERY  2000    Levels unknown, Dr. Decker   • LUMBAR DISCECTOMY  2005    Levels unknown, Dr. Decker   • LUMBAR FUSION      Levels unknown, Dr. Decker   • PROSTATE FIDUCIAL MARKER PLACEMENT         Family History   Problem Relation Age of Onset   • Diabetes Mother    • Breast cancer Mother    • Heart disease Father        Social History     Socioeconomic History   • Marital status:      Spouse name: Lynette   • Number of children: 2   • Years of education: H.S.   • Highest education level: Not on file   Occupational History   • Occupation:      Employer: RETIRED   Tobacco Use   • Smoking status: Former Smoker     Packs/day: 2.00     Years: 15.00     Pack years: 30.00     Last attempt to quit:      Years since quittin.4   • Smokeless tobacco: Never Used   Substance and Sexual Activity   • Alcohol use: No   • Drug use: No   • Sexual activity: Not Currently     Partners: Female   Social History Narrative    Caffeine: 1 cup daily       Allergies   Allergen Reactions   • Penicillins    • Sulfa Antibiotics          Current Outpatient Medications:   •  apixaban (ELIQUIS) 5 MG tablet tablet, Take 5 mg by mouth 2 (Two) Times a Day., Disp: , Rfl:   •  carvedilol (COREG) 3.125 MG tablet, Take 1 tablet by mouth Every 12 (Twelve) Hours., Disp: 60 tablet, Rfl: 2  •  Cholecalciferol (VITAMIN D-3 PO), Take  by mouth Daily. As directed  , Disp: , Rfl:   •  folic acid (FOLVITE) 1 MG tablet, Take 1 tablet by mouth Daily., Disp: 30 tablet, Rfl: 11  •  losartan (COZAAR) 50 MG tablet, Take 1 tablet by mouth Daily., Disp: 30 tablet, Rfl: 2  •  memantine  (NAMENDA) 10 MG tablet, Take 1 tablet by mouth Daily., Disp: 90 tablet, Rfl: 3  •  Multiple Vitamins-Minerals (PRESERVISION AREDS 2 PO), Take  by mouth Daily. As directed  , Disp: , Rfl:   •  pravastatin (PRAVACHOL) 80 MG tablet, Take 1 tablet by mouth Daily., Disp: 90 tablet, Rfl: 2  •  raNITIdine (ZANTAC) 150 MG tablet, Take 1 tablet by mouth Every Night., Disp: 90 tablet, Rfl: 2  •  rivastigmine (EXELON) 4.6 MG/24HR patch, PLACE 1 PATCH ON THE SKIN  AS DIRECTED BY PROVIDER    DAILY, Disp: 30 patch, Rfl: 3  •  vitamin B-12 (CYANOCOBALAMIN) 1000 MCG tablet, Take 1 tablet by mouth Every Other Day., Disp: 45 tablet, Rfl: 3  •  furosemide (LASIX) 20 MG tablet, Take 1 tablet by mouth Daily., Disp: 30 tablet, Rfl: 0  •  potassium chloride (K-DUR) 10 MEQ CR tablet, Take 1 tablet by mouth Daily. Only when taking lasix, Disp: 30 tablet, Rfl: 0    The following portions of the patient's history were reviewed today and updated as appropriate: allergies, current medications, past family history, past medical history, past social history, past surgical history and problem list     Review of Systems   Constitution: Positive for weakness and malaise/fatigue. Negative for chills and fever.   HENT: Negative.    Eyes: Negative.    Cardiovascular: Positive for dyspnea on exertion, irregular heartbeat and leg swelling. Negative for chest pain, claudication, cyanosis, near-syncope, orthopnea, palpitations, paroxysmal nocturnal dyspnea and syncope.   Respiratory: Positive for shortness of breath. Negative for cough and snoring.    Endocrine: Negative.    Hematologic/Lymphatic: Does not bruise/bleed easily.   Skin: Negative for poor wound healing.   Musculoskeletal: Positive for falls and muscle weakness.   Gastrointestinal: Negative for abdominal pain, heartburn, hematemesis, melena, nausea and vomiting.   Genitourinary: Negative.  Negative for hematuria.   Neurological: Positive for light-headedness. Negative for focal weakness.  "  Psychiatric/Behavioral: Negative.    Allergic/Immunologic: Negative.        Objective:     Vitals:    06/17/19 1329 06/17/19 1331   BP: 151/77 (!) 161/107   BP Location: Left arm Right arm   Patient Position: Sitting    Cuff Size: Adult    Pulse: 95 70   Resp: 18    Temp: 97.2 °F (36.2 °C)    TempSrc: Temporal    SpO2: 97% 96%   Weight: 80.1 kg (176 lb 8 oz)    Height: 170.2 cm (67\")        Physical Exam   Constitutional: He is oriented to person, place, and time. He appears well-developed and well-nourished. No distress.   HENT:   Head: Normocephalic.   Eyes: Conjunctivae are normal. Pupils are equal, round, and reactive to light.   Neck: Neck supple. No JVD present. No thyromegaly present.   Cardiovascular: Normal rate, normal heart sounds and intact distal pulses. An irregularly irregular rhythm present. Exam reveals no gallop and no friction rub.   No murmur heard.  Pulmonary/Chest: Effort normal. No respiratory distress. He has no wheezes. He has rales. He exhibits no tenderness.   Abdominal: Soft. Bowel sounds are normal.   Musculoskeletal: Normal range of motion. He exhibits edema (1+ BLE).   Neurological: He is alert and oriented to person, place, and time.   Skin: Skin is warm and dry.   Psychiatric: He has a normal mood and affect. His behavior is normal. Thought content normal.   Vitals reviewed.      Lab and Diagnostic Review:  Results for orders placed or performed during the hospital encounter of 05/15/19   CBC (No Diff)   Result Value Ref Range    WBC 7.44 3.40 - 10.80 10*3/mm3    RBC 4.00 (L) 4.14 - 5.80 10*6/mm3    Hemoglobin 11.8 (L) 13.0 - 17.7 g/dL    Hematocrit 37.4 (L) 37.5 - 51.0 %    MCV 93.5 79.0 - 97.0 fL    MCH 29.5 26.6 - 33.0 pg    MCHC 31.6 31.5 - 35.7 g/dL    RDW 13.8 12.3 - 15.4 %    RDW-SD 47.4 37.0 - 54.0 fl    MPV 11.8 6.0 - 12.0 fL    Platelets 112 (L) 140 - 450 10*3/mm3   Comprehensive Metabolic Panel   Result Value Ref Range    Glucose 110 (H) 65 - 99 mg/dL    BUN 16 8 - 23 " "mg/dL    Creatinine 1.00 0.76 - 1.27 mg/dL    Sodium 144 136 - 145 mmol/L    Potassium 4.0 3.5 - 5.2 mmol/L    Chloride 108 (H) 98 - 107 mmol/L    CO2 22.0 22.0 - 29.0 mmol/L    Calcium 9.4 8.6 - 10.5 mg/dL    Total Protein 6.2 6.0 - 8.5 g/dL    Albumin 3.40 (L) 3.50 - 5.20 g/dL    ALT (SGPT) 15 1 - 41 U/L    AST (SGOT) 20 1 - 40 U/L    Alkaline Phosphatase 83 39 - 117 U/L    Total Bilirubin 0.6 0.2 - 1.2 mg/dL    eGFR Non African Amer 72 >60 mL/min/1.73    Globulin 2.8 gm/dL    A/G Ratio 1.2 g/dL    BUN/Creatinine Ratio 16.0 7.0 - 25.0    Anion Gap 14.0 mmol/L   Troponin   Result Value Ref Range    Troponin T 0.094 (C) 0.000 - 0.030 ng/mL   Troponin   Result Value Ref Range    Troponin T 0.098 (C) 0.000 - 0.030 ng/mL   Echo 5/15/19  · Left atrial enlargement is seen.  · There is severe apical hypokinesia with an estimated ejection fraction of <35%.  · There are fibrocalcific changes in the aortic valve that are associated with mild-moderate \"mixed\" AS/AI.  · There is mitral annular calcification with mild-moderate mitral regurgitation.  · The estimated pulmonary artery pressure is normal.      Assessment and Plan:   1. Acute systolic heart failure (CMS/HCC)  IV diuresis today in office. Patient received 40mg lasix today through a butterfly in the LAC over slow IV push. During IV diuresis, vitals were monitored and stable. Please see IV diuresis record for those vitals. Patient voided 150 ml in the office prior to discharge from the office. Butterfly was d/c'd and area was free of erythema, ecchymosis, or drainage.   Patient will receive a follow up call from the HF center in 24 hours to evaluate urinary output and reassess signs and symptoms.   Heart failure education provided today including signs and symptoms, causes of heart failure, medications, daily weights, low sodium diet (less than 2000 mg per day), 1.5L fluid restriction and daily physical activity as tolerated. Reviewed HF Zones with patient and " family.  Start lasix 20mg daily with 10meq of potassium  Will need BB uptitrated once euvolemic   - Basic Metabolic Panel; Future  - BNP; Future    2. Permanent atrial fibrillation (CMS/HCC)  Rate controlled with coreg  On eliquis    3. Essential hypertension  Elevated today in the setting of FVO  Will need BB/ARB titrated once more euvolemic and BP confirmed stable on diuretics. Patient high risk for falls    He is to see Dr. Thomas on Thursday of this week          It has been a pleasure to participate in the care of this patient.  Patient was instructed to call the [facility] with any questions, concerns, or worsening symptoms.    *Please note that portions of this note were completed with a voice recognition program. Efforts were made to edit the dictations, but occasionally words are mistranscribed.

## 2019-06-17 NOTE — TELEPHONE ENCOUNTER
"Pt's wife calling to report pt has shortness of air at rest, \"breathing hard\". Vitals taking at time of call have 142/77, HR 85. She reports these are numbers are consistent with BP/HR over the last week. Scheduled follow up with [facility] on Thursday.   Per [facility] scheduled in HF clinic with Sarai today at 1:15 for ?IV diuresis/eval. May need further titration on ARB/BB. Wife aware. KH  "

## 2019-06-18 NOTE — TELEPHONE ENCOUNTER
Results for orders placed or performed in visit on 06/17/19   Basic Metabolic Panel   Result Value Ref Range    Glucose 91 65 - 99 mg/dL    BUN 19 8 - 23 mg/dL    Creatinine 1.27 0.76 - 1.27 mg/dL    Sodium 145 136 - 145 mmol/L    Potassium 3.6 3.5 - 5.2 mmol/L    Chloride 108 (H) 98 - 107 mmol/L    CO2 20.7 (L) 22.0 - 29.0 mmol/L    Calcium 9.8 8.6 - 10.5 mg/dL    eGFR Non African Amer 55 (L) >60 mL/min/1.73    BUN/Creatinine Ratio 15.0 7.0 - 25.0    Anion Gap 16.3 mmol/L   BNP   Result Value Ref Range    proBNP 31,076.0 (H) 5.0-1,800.0 pg/mL     Called patient's wife with lab results. She tells me that he is still sleeping. Advised to take 40mg of lasix until he is evaluated by Dr. Thomas. Patient to take 40meq of potassium today and then 20meq with 40mg of lasix thereafter. Repeat labs in one week. Will send order for local lab

## 2019-06-19 NOTE — PROGRESS NOTES
[facility]  [facility]      Encounter Date:06/17/2019     Gibson Mansfield  [address] [facility] [address]  [unfilled]    1940    Samson Richard MD    Gibson Bouchra Mansfield is a 78 y.o. male.      Subjective:     Chief Complaint:  Increased SOB, weight gain    HPI   Patient is an 78-year-old male with past medical history significant for atrial fibrillation,  hypertension, hyperlipidemia, diabetes, prostate cancer, dementia, COPD, falls and newly diagnosed systolic heart failure who presents to the [facility] for worsening dyspnea.  Patient was seen in our clinic on Monday with acute CHF.  He was diuresed with 40 mg of IV Lasix and started on 40 mg of Lasix p.o.  He lost 4 pounds the first day and symptoms improved, however, his wife reports that last night he was unable to sleep again due to orthopnea and worsening dyspnea.  Blood pressure this morning was elevated around 180 systolic and heart rate has been in the low 100s.  Patient continues to feel very weak and fatigued.    Patient Active Problem List   Diagnosis   • Prostate cancer (CMS/HCC)   • PAF (paroxysmal atrial fibrillation) (CMS/HCC)   • Generalized weakness   • Ataxia   • Hypersomnolence   • Cerebral microvascular disease   • Late onset Alzheimer's disease without behavioral disturbance   • Peripheral neuropathy   • DDD (degenerative disc disease), cervical   • Elevated troponin   • Fall   • COPD (chronic obstructive pulmonary disease) (CMS/HCC)   • DM2 (diabetes mellitus, type 2) (CMS/HCC)   • Tympanic membrane perforation   • Sensorineural hearing loss of left ear   • Otitis externa   • Mixed hearing loss of right ear   • Cardiomyopathy (CMS/HCC)       Past Medical History:   Diagnosis Date   • Abdominal hernia    • Atrial fibrillation (CMS/HCC)    • Blindness    • Chronic anticoagulation    • COPD (chronic obstructive pulmonary disease) (CMS/HCC)    • Dementia    • GERD (gastroesophageal reflux disease)     • History of prostate cancer    • History of stroke    • HLD (hyperlipidemia)    • [place] (hard of hearing)    • HTN (hypertension)    • Legally blind    • Macular degeneration of both eyes    • Osteoarthritis of lumbar spine        Past Surgical History:   Procedure Laterality Date   • CATARACT EXTRACTION Bilateral 2016   • COLONOSCOPY     • LUMBAR DISC SURGERY  2000    Levels unknown, Dr. Decker   • LUMBAR DISCECTOMY  2005    Levels unknown, Dr. Decker   • LUMBAR FUSION      Levels unknown, Dr. Decker   • PROSTATE FIDUCIAL MARKER PLACEMENT         Family History   Problem Relation Age of Onset   • Diabetes Mother    • Breast cancer Mother    • Heart disease Father        Social History     Socioeconomic History   • Marital status:      Spouse name: Lynette   • Number of children: 2   • Years of education: H.S.   • Highest education level: Not on file   Occupational History   • Occupation:      Employer: RETIRED   Tobacco Use   • Smoking status: Former Smoker     Packs/day: 2.00     Years: 15.00     Pack years: 30.00     Last attempt to quit:      Years since quittin.4   • Smokeless tobacco: Never Used   Substance and Sexual Activity   • Alcohol use: No   • Drug use: No   • Sexual activity: Not Currently     Partners: Female   Social History Narrative    Caffeine: 1 cup daily       Allergies   Allergen Reactions   • Penicillins    • Sulfa Antibiotics          Current Outpatient Medications:   •  apixaban (ELIQUIS) 5 MG tablet tablet, Take 5 mg by mouth 2 (Two) Times a Day., Disp: , Rfl:   •  carvedilol (COREG) 3.125 MG tablet, Take 2 tablets by mouth Every 12 (Twelve) Hours., Disp: 60 tablet, Rfl: 0  •  Cholecalciferol (VITAMIN D-3 PO), Take  by mouth Daily. As directed  , Disp: , Rfl:   •  folic acid (FOLVITE) 1 MG tablet, Take 1 tablet by mouth Daily., Disp: 30 tablet, Rfl: 11  •  furosemide (LASIX) 20 MG tablet, Take 1 tablet by mouth Daily. (Patient taking  differently: Take 20 mg by mouth 2 (Two) Times a Day.), Disp: 30 tablet, Rfl: 0  •  losartan (COZAAR) 50 MG tablet, Take 1 tablet by mouth Daily., Disp: 30 tablet, Rfl: 2  •  memantine (NAMENDA) 10 MG tablet, Take 1 tablet by mouth Daily., Disp: 90 tablet, Rfl: 3  •  Multiple Vitamins-Minerals (PRESERVISION AREDS 2 PO), Take  by mouth Daily. As directed  , Disp: , Rfl:   •  potassium chloride (K-DUR) 10 MEQ CR tablet, Take 1 tablet by mouth Daily. Only when taking lasix, Disp: 30 tablet, Rfl: 0  •  pravastatin (PRAVACHOL) 80 MG tablet, Take 1 tablet by mouth Daily., Disp: 90 tablet, Rfl: 2  •  raNITIdine (ZANTAC) 150 MG tablet, Take 1 tablet by mouth Every Night., Disp: 90 tablet, Rfl: 2  •  rivastigmine (EXELON) 4.6 MG/24HR patch, PLACE 1 PATCH ON THE SKIN  AS DIRECTED BY PROVIDER    DAILY, Disp: 30 patch, Rfl: 3  •  vitamin B-12 (CYANOCOBALAMIN) 1000 MCG tablet, Take 1 tablet by mouth Every Other Day., Disp: 45 tablet, Rfl: 3    The following portions of the patient's history were reviewed today and updated as appropriate: allergies, current medications, past family history, past medical history, past social history, past surgical history and problem list     Review of Systems   Constitution: Positive for weakness and malaise/fatigue. Negative for chills and fever.   HENT: Negative.    Eyes: Negative.    Cardiovascular: Positive for dyspnea on exertion, irregular heartbeat and leg swelling. Negative for chest pain, claudication, cyanosis, near-syncope, orthopnea, palpitations, paroxysmal nocturnal dyspnea and syncope.   Respiratory: Positive for shortness of breath. Negative for cough and snoring.    Endocrine: Negative.    Hematologic/Lymphatic: Does not bruise/bleed easily.   Skin: Negative for poor wound healing.   Musculoskeletal: Positive for falls and muscle weakness.   Gastrointestinal: Negative for abdominal pain, heartburn, hematemesis, melena, nausea and vomiting.   Genitourinary: Negative.  Negative for  "hematuria.   Neurological: Positive for light-headedness. Negative for focal weakness.   Psychiatric/Behavioral: Negative.    Allergic/Immunologic: Negative.        Objective:     Vitals:    06/19/19 1500   BP: 152/83   BP Location: Left arm   Patient Position: Sitting   Pulse: 86   Resp: 16   Temp: 97.5 °F (36.4 °C)   SpO2: 95%   Weight: 78 kg (172 lb)   Height: 170.2 cm (67\")       Physical Exam   Constitutional: He is oriented to person, place, and time. He appears well-developed and well-nourished. No distress.   HENT:   Head: Normocephalic.   Eyes: Conjunctivae are normal. Pupils are equal, round, and reactive to light.   Neck: Neck supple. No JVD present. No thyromegaly present.   Cardiovascular: Normal rate, normal heart sounds and intact distal pulses. An irregularly irregular rhythm present. Exam reveals no gallop and no friction rub.   No murmur heard.  Pulmonary/Chest: Effort normal. No respiratory distress. He has no wheezes. He has rales. He exhibits no tenderness.   Abdominal: Soft. Bowel sounds are normal.   Musculoskeletal: Normal range of motion. He exhibits edema (1+ BLE).   Neurological: He is alert and oriented to person, place, and time.   Skin: Skin is warm and dry.   Psychiatric: He has a normal mood and affect. His behavior is normal. Thought content normal.   Vitals reviewed.      Lab and Diagnostic Review:  Results for orders placed or performed during the hospital encounter of 05/15/19   CBC (No Diff)   Result Value Ref Range    WBC 7.44 3.40 - 10.80 10*3/mm3    RBC 4.00 (L) 4.14 - 5.80 10*6/mm3    Hemoglobin 11.8 (L) 13.0 - 17.7 g/dL    Hematocrit 37.4 (L) 37.5 - 51.0 %    MCV 93.5 79.0 - 97.0 fL    MCH 29.5 26.6 - 33.0 pg    MCHC 31.6 31.5 - 35.7 g/dL    RDW 13.8 12.3 - 15.4 %    RDW-SD 47.4 37.0 - 54.0 fl    MPV 11.8 6.0 - 12.0 fL    Platelets 112 (L) 140 - 450 10*3/mm3   Comprehensive Metabolic Panel   Result Value Ref Range    Glucose 110 (H) 65 - 99 mg/dL    BUN 16 8 - 23 mg/dL    " "Creatinine 1.00 0.76 - 1.27 mg/dL    Sodium 144 136 - 145 mmol/L    Potassium 4.0 3.5 - 5.2 mmol/L    Chloride 108 (H) 98 - 107 mmol/L    CO2 22.0 22.0 - 29.0 mmol/L    Calcium 9.4 8.6 - 10.5 mg/dL    Total Protein 6.2 6.0 - 8.5 g/dL    Albumin 3.40 (L) 3.50 - 5.20 g/dL    ALT (SGPT) 15 1 - 41 U/L    AST (SGOT) 20 1 - 40 U/L    Alkaline Phosphatase 83 39 - 117 U/L    Total Bilirubin 0.6 0.2 - 1.2 mg/dL    eGFR Non African Amer 72 >60 mL/min/1.73    Globulin 2.8 gm/dL    A/G Ratio 1.2 g/dL    BUN/Creatinine Ratio 16.0 7.0 - 25.0    Anion Gap 14.0 mmol/L   Troponin   Result Value Ref Range    Troponin T 0.094 (C) 0.000 - 0.030 ng/mL   Troponin   Result Value Ref Range    Troponin T 0.098 (C) 0.000 - 0.030 ng/mL   Echo 5/15/19  · Left atrial enlargement is seen.  · There is severe apical hypokinesia with an estimated ejection fraction of <35%.  · There are fibrocalcific changes in the aortic valve that are associated with mild-moderate \"mixed\" AS/AI.  · There is mitral annular calcification with mild-moderate mitral regurgitation.  · The estimated pulmonary artery pressure is normal.      Assessment and Plan:   1. Acute systolic heart failure (CMS/HCC)  IV diuresis today in office. Patient received 80mg lasix today through a butterfly in the RFA over slow IV push. During IV diuresis, vitals were monitored and stable. Please see IV diuresis record for those vitals. Patient voided 150ml in the office prior to discharge from the office. Butterfly was d/c'd and area was free of erythema, ecchymosis, or drainage.  .   CXR today  Continue daily weights, low sodium diet  Increase coreg to 6.25mg BID  Continue losartan  Lasix 40mg daily, may do 40mg BID pending labs and CXR    - Basic Metabolic Panel; Future  - BNP; Future    2. Permanent atrial fibrillation (CMS/HCC)  Rate controlled with coreg. Mildly elevated rates due to fluid volume overload  On eliquis    3. Essential hypertension  Elevated today in the setting of " FVO  Continue gradual uptitration of GDMT.  Patient high risk for falls    Has echo tomorrow and follow up with Dr. Thomas          It has been a pleasure to participate in the care of this patient.  Patient was instructed to call the [facility] with any questions, concerns, or worsening symptoms.    *Please note that portions of this note were completed with a voice recognition program. Efforts were made to edit the dictations, but occasionally words are mistranscribed.

## 2019-06-20 PROBLEM — G45.9 TIA (TRANSIENT ISCHEMIC ATTACK): Status: ACTIVE | Noted: 2019-01-01

## 2019-06-20 PROBLEM — E87.5 HYPERKALEMIA: Status: ACTIVE | Noted: 2019-01-01

## 2019-06-20 PROBLEM — N17.9 ACUTE KIDNEY INJURY (HCC): Status: ACTIVE | Noted: 2019-01-01

## 2019-06-20 PROBLEM — I50.22 SYSTOLIC CHF, CHRONIC (HCC): Status: ACTIVE | Noted: 2019-01-01

## 2019-06-21 PROBLEM — I63.9 ACUTE ISCHEMIC STROKE (HCC): Status: ACTIVE | Noted: 2019-01-01

## 2019-06-21 PROBLEM — R13.10 DYSPHAGIA: Status: ACTIVE | Noted: 2019-01-01

## 2019-07-05 NOTE — TELEPHONE ENCOUNTER
----- Message from Samson Ridley MD sent at 7/3/2019  2:30 PM EDT -----  Regarding: RE: CALL FROM [facility]  Contact: [phone]  [facility]    ----- Message -----  From: Toribio Guido MA  Sent: 7/3/2019  10:08 AM  To: Samson Ridley MD  Subject: FW: CALL FROM [facility]                       ----- Message -----  From: Toribio Guido MA  Sent: 7/3/2019  10:05 AM  To:   Subject: FW: CALL FROM [facility]                        ----- Message -----  From: Cee Schulz RegSched Rep  Sent: 7/3/2019   9:12 AM  To: Toribio Guido MA  Subject: CALL FROM [facility]                        [phone]  PT WIFES CELL PHONE, PT IS IN THE [place] [facility] AFTER SUFFERING A MINI STROKE ON 6/20 PT WIFE WAS WONDERING IF THEY HAD CONTACTED DR RIDLEY ABOUT THIS AND TO SEE IF [facility] WAS GOING TO BE PUTTING IN ANY ORDERS FOR PT, PLEASE LET HER KNOW IF THAT HAS BEEN DONE

## 2019-07-17 PROBLEM — I69.354 HEMIPARESIS OF LEFT NONDOMINANT SIDE AS LATE EFFECT OF CEREBRAL INFARCTION (HCC): Status: ACTIVE | Noted: 2019-01-01

## 2019-07-17 PROBLEM — I63.511 ACUTE ISCHEMIC RIGHT MCA STROKE (HCC): Status: ACTIVE | Noted: 2019-01-01

## 2019-07-17 PROBLEM — R47.1 DYSARTHRIA: Status: ACTIVE | Noted: 2019-01-01

## 2019-07-17 NOTE — PROGRESS NOTES
Subjective:     Patient ID: Gibson Mansfield is a 78 y.o. male.    CC:   Chief Complaint   Patient presents with   • Extremity Weakness       HPI:   History of Present Illness   This is a pleasant 78-year-old male who presents for [facility] follow-up on generalized weakness, ataxia, recurrent falls and worsening symptoms over the past year.  He is accompanied by his wife during today's visit.  He is very hard of hearing and does have Alzheimer's dementia and is currently taking Namenda as well as Exelon patch and this is managed by his PCP.    Unfortunately since last visit he has been admitted to the hospital on 2 occasions first from 5/15/2019 until 5/16/2019 for a fall with elevated troponin and a couple of his cardiac meds were adjusted.  He again was going to have an echocardiogram on 6/20/2019 and developed left-sided facial droop, dysarthria and left-sided weakness.  He was taken from the echo department over to the hospital for stroke work-up.  He was diagnosed with an acute CVA.  This was found in the right MCA distribution and was rather small.  He had been on Eliquis and high-dose statin but they changed him from pravastatin to a atorvastatin as well as added aspirin.  Neurology evaluated him inpatient and felt like this was from a cardiac emboli from ongoing anticoagulation.  He is followed by cardiology.  He is not a candidate for ICD placement.  He did have some issues with dysphagia while he was in the hospital but did end up passing his swallowing evaluation.  He is still crushing medications and taking these with pudding.  They did discuss aspiration precautions and there is a note in his discharge summary about considering palliative and/or hospice care if he were to aspirate.  They did not desire a PEG tube placement.  He was discharged to local rehab facility on 6/27/2019 and was just discharged from the hospital last Friday on 7/19/2019.  He did refuse to do rehabilitation at home but his  wife tells me he is doing well.  He is able to walk with a walker at home to get up and use the restroom on his own.  He has had no recent falls.  He is tolerating the medications.  He is in a wheelchair today since he is weaker with walking long distances.  She tells me that the speech has slowly improved.  He did complete inpatient PT, OT and ST before being sent home.      Previous neuro workup included:  He also had an MRI of the cervical spine without contrast completed on 3/20/2019 and this does show borderline C4-C5 canal stenosis due to central disc bulge, there are several levels of foraminal narrowing which is mild from C4-C5, C5-C6 and C3-C4.  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  He also had blood work with RPR being normal, folate 9 normal, methylmalonic acid 324 normal, free T4 1.16 normal, TSH 1.225 normal, vitamin B12 on the left side of normal at 428, CMP essentially within normal limits with a glucose of 110 nonfasting, CK normal at 74, CBC with differential did show some minimally low platelets at 149 with 150-450 being the normal range.  He and his wife tell me that he has not had any recent falls but did lower himself to the floor a few weeks ago.  He did have an episode several years back of left facial numbness and his wife is wondering if this was when he had his stroke.  He has never been diagnosed with a stroke but these do run in his family and he is currently on Eliquis as well as pravastatin with a history of hyperlipidemia as well as atrial fibrillation and followed by cardiology regularly.  He does not have any pain or discomfort in his legs with the neuropathy.  He is using a walker at all times.  His wife would like a new order for physical therapy.  He also previously per Dr. Crum's notes had an MRI of the lumbar  spine in 2018 which showed multilevel degenerative disc changes as well as some mild stenosis.    The following portions of the patient's history were reviewed and updated as appropriate: allergies, current medications, past family history, past medical history, past social history, past surgical history and problem list.    Past Medical History:   Diagnosis Date   • Abdominal hernia    • Atrial fibrillation (CMS/HCC)    • Blindness    • Chronic anticoagulation    • COPD (chronic obstructive pulmonary disease) (CMS/[facility])    • Dementia    • Diabetes mellitus (CMS/HCC)    • GERD (gastroesophageal reflux disease)    • History of prostate cancer    • History of stroke    • HLD (hyperlipidemia)    • [place] (hard of hearing)    • HTN (hypertension)    • Legally blind    • Macular degeneration of both eyes    • Osteoarthritis of lumbar spine        Past Surgical History:   Procedure Laterality Date   • CATARACT EXTRACTION Bilateral    • COLONOSCOPY     • LUMBAR DISC SURGERY      Levels unknown, Dr. Decker   • LUMBAR DISCECTOMY      Levels unknown, Dr. Decker   • LUMBAR FUSION      Levels unknown, Dr. Decker   • PROSTATE FIDUCIAL MARKER PLACEMENT         Social History     Socioeconomic History   • Marital status:      Spouse name: Lynette   • Number of children: 2   • Years of education: H.S.   • Highest education level: Not on file   Occupational History   • Occupation:      Employer: RETIRED   Tobacco Use   • Smoking status: Former Smoker     Packs/day: 2.00     Years: 15.00     Pack years: 30.00     Last attempt to quit:      Years since quittin.5   • Smokeless tobacco: Never Used   Substance and Sexual Activity   • Alcohol use: Yes     Comment: Rarely drinks a beer   • Drug use: No   • Sexual activity: Not Currently     Partners: Female   Social History Narrative    Caffeine: 1 cup daily       Family History   Problem Relation Age of Onset   • Diabetes Mother     • Breast cancer Mother    • Heart disease Father         Review of Systems   Constitutional: Negative for chills, fatigue, fever and unexpected weight change.   HENT: Negative for ear pain, hearing loss, nosebleeds, rhinorrhea and sore throat.    Eyes: Negative for photophobia, pain, discharge, itching and visual disturbance.   Respiratory: Negative for cough, chest tightness, shortness of breath and wheezing.    Cardiovascular: Negative for chest pain, palpitations and leg swelling.   Gastrointestinal: Negative for abdominal pain, blood in stool, constipation, diarrhea, nausea and vomiting.   Genitourinary: Negative for dysuria, frequency, hematuria and urgency.   Musculoskeletal: Negative for arthralgias, back pain, gait problem, joint swelling, myalgias, neck pain and neck stiffness.   Skin: Negative for rash and wound.   Allergic/Immunologic: Negative for environmental allergies and food allergies.   Neurological: Negative for dizziness, tremors, seizures, syncope, speech difficulty, weakness, light-headedness, numbness and headaches.   Hematological: Negative for adenopathy. Does not bruise/bleed easily.   Psychiatric/Behavioral: Negative for agitation, confusion, decreased concentration, hallucinations, sleep disturbance and suicidal ideas. The patient is not nervous/anxious.         Objective:    Neurologic Exam     Mental Status   Oriented to person, place, and time.   Level of consciousness: alert    Cranial Nerves     CN II   Visual acuity: decreased (severe macular degeneration)    CN III, IV, VI   Pupils are equal, round, and reactive to light.  Extraocular motions are normal.     CN V   Facial sensation intact.     CN VII   Right facial weakness: none  Left facial weakness: central    CN VIII   Hearing: impaired    CN IX, X   CN IX normal.   CN X normal.     CN XI   CN XI normal.     CN XII   CN XII normal.     Motor Exam   Muscle bulk: normal  Overall muscle tone: normal    Strength   Strength 5/5  except as noted.   Left strength: Mildly weak left side    Gait, Coordination, and Reflexes     Gait  Gait: (in wheelchair today, not assessed)    Coordination   Finger to nose coordination: normal    Tremor   Resting tremor: absent  Intention tremor: absent  Action tremor: absent    Reflexes   Right brachioradialis: 2+  Left brachioradialis: 2+  Right biceps: 2+  Left biceps: 2+  Right triceps: 2+  Left triceps: 2+  Right patellar: 1+  Left patellar: 1+  Right achilles: 1+  Left achilles: 1+  Right : 2+  Left : 2+      Physical Exam   Constitutional: He is oriented to person, place, and time.   Eyes: EOM are normal. Pupils are equal, round, and reactive to light.   Neurological: He is oriented to person, place, and time. He has a normal Finger-Nose-Finger Test.   Reflex Scores:       Tricep reflexes are 2+ on the right side and 2+ on the left side.       Bicep reflexes are 2+ on the right side and 2+ on the left side.       Brachioradialis reflexes are 2+ on the right side and 2+ on the left side.       Patellar reflexes are 1+ on the right side and 1+ on the left side.       Achilles reflexes are 1+ on the right side and 1+ on the left side.      Assessment/Plan:       Gibson was seen today for extremity weakness.    Diagnoses and all orders for this visit:    Cerebral microvascular disease  Comments:  2nd to CVA    Acute ischemic right MCA stroke (CMS/[facility])  Comments:  continue eliquis, aspirin and atorvastatin now.  Orders:  -     atorvastatin (LIPITOR) 80 MG tablet; Take 1 tablet by mouth Every Night.  -     aspirin 81 MG chewable tablet; Chew 1 tablet Daily.    Dysarthria  Comments:  stable, improving    Oropharyngeal dysphagia  Comments:  continue crushed pills/monitor for aspiration. improving    Generalized weakness  Comments:  refused home rehab-just completed Inpatient rehab and d/c 5 days ago    Ataxia  Comments:  using walker at home, wheelchair on longer trips    Hemiparesis of left nondominant  side as late effect of cerebral infarction (CMS/[facility])  Comments:  continue walker. completed rehab         Continue current meds. Aspiration and falls precautions. If he were to fall recommend he be checked with increased bleeding risks. Wife verbalizes understanding. F/U with Cardiology and PCP as scheduled. F/U with Dr. Crum in 3 months or sooner if needed. Reviewed medications, potential side effects and signs and symptoms to report. Discussed risk versus benefits of treatment plan with patient and/or family-including medications, labs and radiology that may be ordered. Addressed questions and concerns during visit. Patient and/or family verbalized understanding and agree with plan.    During this visit the following were done:  Labs Reviewed [x]    Labs Ordered []    Radiology Reports Reviewed [x]    Radiology Ordered []    PCP Records Reviewed []    Referring Provider Records Reviewed []    ER Records Reviewed [x]    Hospital Records Reviewed [x]    History Obtained From Family [x]    Radiology Images Reviewed [x]    Other Reviewed [x]    Records Requested []      Discussed signs and symptoms of stroke and when to call 911. Instructed to follow a low fat diet including the Mediterranean diet. Instructed to take all medications daily as prescribed.Stay well hydrated. Discussed potential side effects of new medications and signs and symptoms to report. If patient is currently using tobacco products, smoking cessation was encouraged. Reviewed stroke risk factors and stroke prevention plan. Patient and/or family verbalizes understanding and agrees with plan.     EMR Dragon/Transcription Disclaimer:  Much of this encounter note is an electronic transcription of spoken language to printed text. Electronic transcription of spoken language may permit erroneous words or phrases to be inadvertently transcribed. Although I have reviewed the note for such errors, some may still exist in this documentation.      Anitha  Joey Haines, SKY  7/17/2019

## 2019-07-30 NOTE — TELEPHONE ENCOUNTER
Patient is no longer being seen by Sarai Rutledge. He is being seen by Dr. Thomas.     Thanks,  Zac Luong, PharmD, DONVAAN

## 2019-09-19 NOTE — PROGRESS NOTES
"  OFFICE FOLLOW UP     Date of Encounter:2019     Name: Gibson Mansfield  : 1940  Address: [address] [place] [address] [address] [id number]    PCP: Samson Richard MD  [id number]  [facility] [address]    Gibson Mansfield is a 78 y.o. male.      Chief Complaint: CVA, PAF    Problem List:   1. Paroxysmal atrial fibrillation:  a. Symptoms of fatigue over the last month with EKG on 2015 revealing atrial fibrillation with controlled rate.  b. CHADS-VASc = 4.   c. Apixaban therapy instituted.  d. Echo 2015: EF 55-60%, moderate aortic calcification, mild AI  e. 24-hour Holter, A-fib rate  bpm, rate>120 (intermittent) 17 of 24 hours (2017)  f. All symptoms abated after discontinuation of beta blocker.  g. Echo 5/15/19: LA enlargement; severe apical hypokinesia with EF <35%, fibrocalcific aortic valve with mild-moderate \"mixed\" AS/AI, mild-mod MR  h. Echo, 19: EF 30%, mild-moderate AS/AI, negative bubble study  2. Hypertension.   3. Hyperlipidemia.   4. Diabetes mellitus type 2, diet controlled.   5. Osteoarthritis.   6. Prostate cancer:  a. “Watch and wait” by primary care.   7. History of PUD.   8. Dementia.  9. History of remote tobacco abuse  10. Peripheral neuropathy  11. Hard of hearing   12. CVA, 2019 (dysarthria and left sided facial droop)  a. MRI revealed right frontal lobe infarction  b. No PFO by Echo  c. CT perfusion showed \"slow flow\" in the right MCA, intervention deferred by neurosurgery  13. Lower extremity weakness and falls    a. Negative NS evaluation  b. Negative Neurology evaluation 2019   c. ROXANN's 2018: Right =0.95; left is abnormal with significant inflow (aorto-iliac) and infrainguinal arterial occlusive disease   d. Fall, May 2019 with subsequent hospitalization   e.   Right frontal lobe ischemic stroke, 2019, while of Apixaban. ASA added.      Allergies:  Allergies   Allergen Reactions   • Penicillins    • Sulfa Antibiotics  " "      Current Medications:  •  apixaban (ELIQUIS) 5 MG tablet tablet, Take 1 tablet by mouth Every 12 (Twelve) Hours  •  aspirin 81 MG chewable tablet, Chew 1 tablet Daily.  •  atorvastatin (LIPITOR) 80 MG tablet, Take 1 tablet by mouth Every Night  •  carvedilol (COREG) 3.125 MG tablet, Take 2 tablets by mouth Every 12 (Twelve) Hours  •  Cholecalciferol (VITAMIN D3) 3000 units tablet, Take 3,000 Units by mouth Daily.  •  folic acid (FOLVITE) 1 MG tablet, Take 1 tablet by mouth Daily  •  losartan (COZAAR) 50 MG tablet, Take 1 tablet by mouth Daily.,  •  memantine (NAMENDA) 10 MG tablet, Take 10 mg by mouth Daily  •  Multiple Vitamins-Minerals (PRESERVISION AREDS 2 PO), Take 2 tablets by mouth Daily. As directed     History of Present Illness: The patient returns for routine scheduled follow-up today.  At the time of his last visit, I asked him to obtain an echocardiogram prior to this visit.  He reported to [facility] for his echo in late June.  At the time of arrival abnormalities were noted by the technical staff and the patient was subsequently seen and admitted to hospital with a diagnosis of \"stroke\".  In the review of records, the patient had MRI evidence of a right frontal ischemic stroke.  LVO was absent by report. Obstructive carotid disease was not found.  Paroxysmal atrial fib was noted; however, the patient was on apixaban at a therapeutic dose. Obstructive carotid disease was not demonstrated. PFO was not present by agitated saline study. TCT not performed. Symptoms of heart failure were not present at rest. Pro-BNP was >20K and CXR reported to show some degree of heart failure; I cannot retrieve images tonight (EPIC issue). ECHO EF was <35% and this was \"new\". He has not had symptomatic arrhythmias.  Patient's wife tells me that she was disappointed that I did not come to see her .  It should be noted no cardiologist was consulted on this admission (per my review of hospital " "records today).  The patient was discharged to rehabilitation with the addition of aspirin to apixaban and is now \"almost back to normal\".         The following portions of the patient's history were reviewed and updated as appropriate: allergies, current medications and problem list.    ROS: Pertinent positives as listed in the HPI.  All other systems reviewed and negative.    Objective:    Vitals:    09/19/19 1550 09/19/19 1553   BP: 112/75 120/86   BP Location: Left arm Left arm   Patient Position: Sitting Standing   Pulse: 65 75   Weight: 79.2 kg (174 lb 9.6 oz)    Height: 170.2 cm (67\")        Physical Exam:  GENERAL: Alert, cooperative, in no acute distress.   HEENT: Normocephalic, no adenopathy, no jugular venous distention  HEART: No discrete PMI is noted. Regular rhythm, normal rate, and no murmurs, gallops, or rubs.   LUNGS: Clear to auscultation bilaterally. No wheezing, rales or ronchi.  ABDOMEN: Soft, bowel sounds present, non-tender   NEUROLOGIC: Deferred to neurologist.  EXTREMITIES: No clubbing, cyanosis, or edema noted.     Diagnostic Data:    Lab Results   Component Value Date    CHOL 129 06/21/2019    TRIG 84 06/21/2019    HDL 40 06/21/2019    LDL 72 06/21/2019     Lab Results   Component Value Date    WBC 5.57 06/21/2019    HGB 11.1 (L) 06/21/2019    HCT 35.1 (L) 06/21/2019    MCV 91.9 06/21/2019     (L) 06/21/2019     Lab Results   Component Value Date    TSH 1.225 02/19/2019     Lab Results   Component Value Date    GLUCOSE 99 06/21/2019    BUN 19 06/21/2019    CREATININE 1.45 (H) 06/21/2019    EGFRIFNONA 47 (L) 06/21/2019    BCR 13.1 06/21/2019    K 3.6 06/22/2019    CO2 26.0 06/21/2019    CALCIUM 8.9 06/21/2019    ALBUMIN 3.40 (L) 05/15/2019    AST 17 06/20/2019    ALT 8 06/20/2019       Procedures      Assessment and Plan:   1.  CVA: Ischemic right frontal lobe stroke thought (by neurology) to be embolic from atrial fibrillation while on Eliquis.  The patient is made a good recovery " "and is now taking aspirin and Eliquis without recurrence.  2.  SHF: This is relatively \"new\" and completely asymptomatic and is sedentary gentleman.  It appears to be \"real\" in terms of associated proBNP.  The patient is on a beta-blocker and angiotensin receptor blocker and is normotensive.  I do not think that he is a candidate for ICD at this time.  We will recheck an echo when he comes back in 6 months.  3.  PAF: Continue apixaban and aspirin.    I will see Gibson Bouchra Morrowey back in 6 months or sooner on an as needed basis.      EMR Dragon/Transcription Disclaimer:  Much of this encounter note is an electronic transcription/translation of spoken language to printed text.  The electronic translation of spoken language may permit erroneous, or at times, nonsensical words or phrases to be inadvertently transcribed.  Although I have reviewed the note for such errors, some may still exist.             "

## 2019-10-30 NOTE — PROGRESS NOTES
Subjective:     Patient ID: Gibson Mansfield is a 79 y.o. male.    CC:   Chief Complaint   Patient presents with   • Extremity Weakness       HPI:   History of Present Illness  The following portions of the patient's history were reviewed and updated as appropriate: allergies, current medications, past family history, past medical history, past social history, past surgical history and problem list.     Patient is back with his wife. He has had an embolic RMCA stroke 22 PAF in June with good recovery. He is mostly wheelchair dependent, gets up with a walker some. He does not want to do PT thru home [facility] but is willing to go to outpatient PT. No recent falls. Continues on Eliquis and aspirin.    Past Medical History:   Diagnosis Date   • Abdominal hernia    • Atrial fibrillation (CMS/HCC)    • Blindness    • Chronic anticoagulation    • COPD (chronic obstructive pulmonary disease) (CMS/HCC)    • Dementia (CMS/HCC)    • Diabetes mellitus (CMS/HCC)    • GERD (gastroesophageal reflux disease)    • History of prostate cancer    • History of stroke    • HLD (hyperlipidemia)    • [place] (hard of hearing)    • HTN (hypertension)    • Legally blind    • Macular degeneration of both eyes    • Osteoarthritis of lumbar spine        Past Surgical History:   Procedure Laterality Date   • CATARACT EXTRACTION Bilateral 2016   • COLONOSCOPY  2013   • LUMBAR DISC SURGERY  2000    Levels unknown, Dr. Decker   • LUMBAR DISCECTOMY  2005    Levels unknown, Dr. Decker   • LUMBAR FUSION  1990    Levels unknown, Dr. Decker   • PROSTATE FIDUCIAL MARKER PLACEMENT  2016       Social History     Socioeconomic History   • Marital status:      Spouse name: Lynette   • Number of children: 2   • Years of education: H.S.   • Highest education level: Not on file   Occupational History   • Occupation:      Employer: RETIRED   Tobacco Use   • Smoking status: Former Smoker     Packs/day: 2.00     Years: 15.00     Pack years:  30.00     Last attempt to quit:      Years since quittin.8   • Smokeless tobacco: Never Used   Substance and Sexual Activity   • Alcohol use: Yes     Comment: Rarely drinks a beer   • Drug use: No   • Sexual activity: Not Currently     Partners: Female   Social History Narrative    Caffeine: 1 cup daily       Family History   Problem Relation Age of Onset   • Diabetes Mother    • Breast cancer Mother    • Heart disease Father         Review of Systems   Constitutional: Negative for chills, fatigue, fever and unexpected weight change.   HENT: Negative for ear pain, hearing loss, nosebleeds, rhinorrhea and sore throat.    Eyes: Negative for photophobia, pain, discharge, itching and visual disturbance.   Respiratory: Negative for cough, chest tightness, shortness of breath and wheezing.    Cardiovascular: Negative for chest pain, palpitations and leg swelling.   Gastrointestinal: Negative for abdominal pain, blood in stool, constipation, diarrhea, nausea and vomiting.   Genitourinary: Negative for dysuria, frequency, hematuria and urgency.   Musculoskeletal: Negative for arthralgias, back pain, gait problem, joint swelling, myalgias, neck pain and neck stiffness.   Skin: Negative for rash and wound.   Allergic/Immunologic: Negative for environmental allergies and food allergies.   Neurological: Negative for dizziness, tremors, seizures, syncope, speech difficulty, weakness, light-headedness, numbness and headaches.   Hematological: Negative for adenopathy. Does not bruise/bleed easily.   Psychiatric/Behavioral: Negative for agitation, confusion, decreased concentration, hallucinations, sleep disturbance and suicidal ideas. The patient is not nervous/anxious.         Objective:    Neurologic Exam    Physical Exam   Constitutional: He appears well-developed and well-nourished.   HENT:   Head: Normocephalic and atraumatic.   Cardiovascular: Normal rate and regular rhythm.   Pulmonary/Chest: Effort normal.    Neurological: He is alert. He has normal reflexes. No cranial nerve deficit.   Speech clear, no facial droop, moves arms and legs well against gravity.   Psychiatric: He has a normal mood and affect. His behavior is normal. Thought content normal.       Assessment/Plan:       Gibson was seen today for extremity weakness.    Diagnoses and all orders for this visit:    Ataxia, multifactorial  -     Ambulatory Referral to Physical Therapy Evaluate and treat    Generalized weakness  -     Ambulatory Referral to Physical Therapy Evaluate and treat    Acute ischemic right MCA stroke (CMS/HCC)  -     Ambulatory Referral to Physical Therapy Evaluate and treat        -     Continue current meds, report new symptoms immediately.           Rafi Crum MD  10/30/2019

## 2019-10-31 NOTE — TELEPHONE ENCOUNTER
----- Message from Danielle Beth sent at 10/31/2019 11:04 AM EDT -----  Regarding: DR. RANJANA MARTINEZ, [facility] CALLED [phone] CAN'T READ THE BOTTOM OF THE PT ORDER,  IT SAYS ...SOMETHING PROGRAM.  NEED CLARIFICATION ON THAT.

## 2019-11-11 NOTE — PROGRESS NOTES
Wife reports increased lower extremity edema and shortness of breath. No recent labwork on file. Per [facility] order labs and Rx diuretics as needed. Wife agreeable. [facility] Fax [phone] or [phone]. KH

## 2019-11-13 NOTE — PROGRESS NOTES
Spoke with wife, labs reviewed with [facility]. Order noted for Lasix 40 mg daily X 3 days then PRN. Will take K 20 meq daily with Lasix. Wife aware and agreeable. Will update on Friday. THERESE

## 2020-01-01 ENCOUNTER — HOSPITAL ENCOUNTER (OUTPATIENT)
Dept: GENERAL RADIOLOGY | Facility: HOSPITAL | Age: 80
Discharge: HOME | End: 2020-01-22
Admitting: INTERNAL MEDICINE

## 2020-01-01 ENCOUNTER — APPOINTMENT (OUTPATIENT)
Dept: CARDIOLOGY | Facility: HOSPITAL | Age: 80
End: 2020-01-01

## 2020-01-01 ENCOUNTER — APPOINTMENT (OUTPATIENT)
Dept: ULTRASOUND IMAGING | Facility: HOSPITAL | Age: 80
End: 2020-01-01

## 2020-01-01 ENCOUNTER — TRANSCRIBE ORDERS (OUTPATIENT)
Dept: ADMINISTRATIVE | Facility: HOSPITAL | Age: 80
End: 2020-01-01

## 2020-01-01 ENCOUNTER — HOSPITAL ENCOUNTER (INPATIENT)
Facility: HOSPITAL | Age: 80
LOS: 5 days | Discharge: HOSPICE | End: 2020-01-28
Attending: FAMILY MEDICINE | Admitting: INTERNAL MEDICINE

## 2020-01-01 ENCOUNTER — APPOINTMENT (OUTPATIENT)
Dept: CT IMAGING | Facility: HOSPITAL | Age: 80
End: 2020-01-01

## 2020-01-01 ENCOUNTER — APPOINTMENT (OUTPATIENT)
Dept: GENERAL RADIOLOGY | Facility: HOSPITAL | Age: 80
End: 2020-01-01

## 2020-01-01 VITALS
TEMPERATURE: 97.2 F | HEART RATE: 89 BPM | BODY MASS INDEX: 25.13 KG/M2 | DIASTOLIC BLOOD PRESSURE: 89 MMHG | HEIGHT: 67 IN | SYSTOLIC BLOOD PRESSURE: 119 MMHG | WEIGHT: 160.1 LBS | RESPIRATION RATE: 18 BRPM | OXYGEN SATURATION: 96 %

## 2020-01-01 DIAGNOSIS — R13.10 DYSPHAGIA, UNSPECIFIED TYPE: Primary | ICD-10-CM

## 2020-01-01 DIAGNOSIS — I50.23 ACUTE ON CHRONIC SYSTOLIC CONGESTIVE HEART FAILURE (HCC): ICD-10-CM

## 2020-01-01 DIAGNOSIS — K46.9 HERNIA OF ABDOMINAL CAVITY: Primary | ICD-10-CM

## 2020-01-01 DIAGNOSIS — K46.9 HERNIA OF ABDOMINAL CAVITY: ICD-10-CM

## 2020-01-01 LAB
25(OH)D3 SERPL-MCNC: 62 NG/ML (ref 30–100)
ACANTHOCYTES BLD QL SMEAR: NORMAL
ALBUMIN SERPL-MCNC: 3.4 G/DL (ref 2.9–4.4)
ALBUMIN SERPL-MCNC: 3.4 G/DL (ref 3.5–5.2)
ALBUMIN SERPL-MCNC: 3.5 G/DL (ref 3.5–5.2)
ALBUMIN SERPL-MCNC: 3.7 G/DL (ref 3.5–5.2)
ALBUMIN SERPL-MCNC: 3.7 G/DL (ref 3.5–5.2)
ALBUMIN/GLOB SERPL: 1.2 G/DL
ALBUMIN/GLOB SERPL: 1.2 {RATIO} (ref 0.7–1.7)
ALBUMIN/GLOB SERPL: 1.3 G/DL
ALP SERPL-CCNC: 120 U/L (ref 39–117)
ALP SERPL-CCNC: 128 U/L (ref 39–117)
ALP SERPL-CCNC: 129 U/L (ref 39–117)
ALP SERPL-CCNC: 135 U/L (ref 39–117)
ALPHA1 GLOB FLD ELPH-MCNC: 0.3 G/DL (ref 0–0.4)
ALPHA2 GLOB SERPL ELPH-MCNC: 0.7 G/DL (ref 0.4–1)
ALT SERPL W P-5'-P-CCNC: 40 U/L (ref 1–41)
ALT SERPL W P-5'-P-CCNC: 54 U/L (ref 1–41)
ALT SERPL W P-5'-P-CCNC: 55 U/L (ref 1–41)
ALT SERPL W P-5'-P-CCNC: 66 U/L (ref 1–41)
AMMONIA BLD-SCNC: 13 UMOL/L (ref 16–60)
AMMONIA BLD-SCNC: 18 UMOL/L (ref 16–60)
ANION GAP SERPL CALCULATED.3IONS-SCNC: 12 MMOL/L (ref 5–15)
ANION GAP SERPL CALCULATED.3IONS-SCNC: 14 MMOL/L (ref 5–15)
ANION GAP SERPL CALCULATED.3IONS-SCNC: 15 MMOL/L (ref 5–15)
ANION GAP SERPL CALCULATED.3IONS-SCNC: 16 MMOL/L (ref 5–15)
ANION GAP SERPL CALCULATED.3IONS-SCNC: 17 MMOL/L (ref 5–15)
ANION GAP SERPL CALCULATED.3IONS-SCNC: 18 MMOL/L (ref 5–15)
ANION GAP SERPL CALCULATED.3IONS-SCNC: 19 MMOL/L (ref 5–15)
APTT PPP: 39.7 SECONDS (ref 24–37)
AST SERPL-CCNC: 53 U/L (ref 1–40)
AST SERPL-CCNC: 56 U/L (ref 1–40)
AST SERPL-CCNC: 64 U/L (ref 1–40)
AST SERPL-CCNC: 79 U/L (ref 1–40)
B-GLOBULIN SERPL ELPH-MCNC: 0.9 G/DL (ref 0.7–1.3)
BASOPHILS # BLD AUTO: 0.01 10*3/MM3 (ref 0–0.2)
BASOPHILS # BLD AUTO: 0.02 10*3/MM3 (ref 0–0.2)
BASOPHILS # BLD AUTO: 0.03 10*3/MM3 (ref 0–0.2)
BASOPHILS # BLD AUTO: 0.03 10*3/MM3 (ref 0–0.2)
BASOPHILS # BLD AUTO: 0.04 10*3/MM3 (ref 0–0.2)
BASOPHILS # BLD MANUAL: 0 10*3/MM3 (ref 0–0.2)
BASOPHILS NFR BLD AUTO: 0 % (ref 0–1.5)
BASOPHILS NFR BLD AUTO: 0.1 % (ref 0–1.5)
BASOPHILS NFR BLD AUTO: 0.3 % (ref 0–1.5)
BASOPHILS NFR BLD AUTO: 0.3 % (ref 0–1.5)
BASOPHILS NFR BLD AUTO: 0.4 % (ref 0–1.5)
BASOPHILS NFR BLD AUTO: 0.6 % (ref 0–1.5)
BH CV ECHO MEAS - AI DEC SLOPE: 150.3 CM/SEC^2
BH CV ECHO MEAS - AI MAX PG: 57.2 MMHG
BH CV ECHO MEAS - AI MAX VEL: 375.4 CM/SEC
BH CV ECHO MEAS - AI P1/2T: 731.4 MSEC
BH CV ECHO MEAS - AO MAX PG (FULL): 19.1 MMHG
BH CV ECHO MEAS - AO MAX PG: 23.1 MMHG
BH CV ECHO MEAS - AO MEAN PG (FULL): 11.8 MMHG
BH CV ECHO MEAS - AO MEAN PG: 13.5 MMHG
BH CV ECHO MEAS - AO ROOT AREA (BSA CORRECTED): 1.9
BH CV ECHO MEAS - AO ROOT AREA: 10.6 CM^2
BH CV ECHO MEAS - AO ROOT DIAM: 3.7 CM
BH CV ECHO MEAS - AO V2 MAX: 240.1 CM/SEC
BH CV ECHO MEAS - AO V2 MEAN: 169.6 CM/SEC
BH CV ECHO MEAS - AO V2 VTI: 51.9 CM
BH CV ECHO MEAS - AVA(I,A): 1.4 CM^2
BH CV ECHO MEAS - AVA(I,D): 1.4 CM^2
BH CV ECHO MEAS - AVA(V,A): 1.5 CM^2
BH CV ECHO MEAS - AVA(V,D): 1.5 CM^2
BH CV ECHO MEAS - BSA(HAYCOCK): 1.9 M^2
BH CV ECHO MEAS - BSA: 1.9 M^2
BH CV ECHO MEAS - BZI_BMI: 26.6 KILOGRAMS/M^2
BH CV ECHO MEAS - BZI_METRIC_HEIGHT: 170.2 CM
BH CV ECHO MEAS - BZI_METRIC_WEIGHT: 77.1 KG
BH CV ECHO MEAS - EDV(CUBED): 247.8 ML
BH CV ECHO MEAS - EDV(MOD-SP2): 168 ML
BH CV ECHO MEAS - EDV(MOD-SP4): 156 ML
BH CV ECHO MEAS - EDV(TEICH): 199.8 ML
BH CV ECHO MEAS - EF(CUBED): 27.4 %
BH CV ECHO MEAS - EF(MOD-BP): 31 %
BH CV ECHO MEAS - EF(MOD-SP2): 31.5 %
BH CV ECHO MEAS - EF(MOD-SP4): 29.5 %
BH CV ECHO MEAS - EF(TEICH): 21.7 %
BH CV ECHO MEAS - ESV(CUBED): 179.9 ML
BH CV ECHO MEAS - ESV(MOD-SP2): 115 ML
BH CV ECHO MEAS - ESV(MOD-SP4): 110 ML
BH CV ECHO MEAS - ESV(TEICH): 156.5 ML
BH CV ECHO MEAS - FS: 10.1 %
BH CV ECHO MEAS - IVS/LVPW: 0.81
BH CV ECHO MEAS - IVSD: 0.87 CM
BH CV ECHO MEAS - LA DIMENSION: 5.7 CM
BH CV ECHO MEAS - LA/AO: 1.6
BH CV ECHO MEAS - LAD MAJOR: 5.7 CM
BH CV ECHO MEAS - LAT PEAK E' VEL: 8.5 CM/SEC
BH CV ECHO MEAS - LATERAL E/E' RATIO: 11.5
BH CV ECHO MEAS - LV DIASTOLIC VOL/BSA (35-75): 82.7 ML/M^2
BH CV ECHO MEAS - LV MASS(C)D: 255.8 GRAMS
BH CV ECHO MEAS - LV MASS(C)DI: 135.6 GRAMS/M^2
BH CV ECHO MEAS - LV MAX PG: 4 MMHG
BH CV ECHO MEAS - LV MEAN PG: 1.7 MMHG
BH CV ECHO MEAS - LV SYSTOLIC VOL/BSA (12-30): 58.3 ML/M^2
BH CV ECHO MEAS - LV V1 MAX: 99.7 CM/SEC
BH CV ECHO MEAS - LV V1 MEAN: 58.7 CM/SEC
BH CV ECHO MEAS - LV V1 VTI: 20.2 CM
BH CV ECHO MEAS - LVIDD: 6.3 CM
BH CV ECHO MEAS - LVIDS: 5.6 CM
BH CV ECHO MEAS - LVLD AP2: 8.3 CM
BH CV ECHO MEAS - LVLD AP4: 7.9 CM
BH CV ECHO MEAS - LVLS AP2: 7.8 CM
BH CV ECHO MEAS - LVLS AP4: 7.4 CM
BH CV ECHO MEAS - LVOT AREA (M): 3.5 CM^2
BH CV ECHO MEAS - LVOT AREA: 3.5 CM^2
BH CV ECHO MEAS - LVOT DIAM: 2.1 CM
BH CV ECHO MEAS - LVPWD: 1.1 CM
BH CV ECHO MEAS - MED PEAK E' VEL: 2.5 CM/SEC
BH CV ECHO MEAS - MEDIAL E/E' RATIO: 37.9
BH CV ECHO MEAS - MV DEC TIME: 0.17 SEC
BH CV ECHO MEAS - MV E MAX VEL: 98.7 CM/SEC
BH CV ECHO MEAS - MV MAX PG: 4.4 MMHG
BH CV ECHO MEAS - MV MEAN PG: 1.4 MMHG
BH CV ECHO MEAS - MV V2 MAX: 105.1 CM/SEC
BH CV ECHO MEAS - MV V2 MEAN: 52 CM/SEC
BH CV ECHO MEAS - MV V2 VTI: 20.7 CM
BH CV ECHO MEAS - MVA(VTI): 3.5 CM^2
BH CV ECHO MEAS - PA ACC SLOPE: 525.7 CM/SEC^2
BH CV ECHO MEAS - PA ACC TIME: 0.08 SEC
BH CV ECHO MEAS - PA MAX PG: 2.5 MMHG
BH CV ECHO MEAS - PA PR(ACCEL): 41 MMHG
BH CV ECHO MEAS - PA V2 MAX: 79.6 CM/SEC
BH CV ECHO MEAS - PI END-D VEL: 52.3 CM/SEC
BH CV ECHO MEAS - RVSP: 42 MMHG
BH CV ECHO MEAS - SI(AO): 291.7 ML/M^2
BH CV ECHO MEAS - SI(CUBED): 36 ML/M^2
BH CV ECHO MEAS - SI(LVOT): 37.9 ML/M^2
BH CV ECHO MEAS - SI(MOD-SP2): 28.1 ML/M^2
BH CV ECHO MEAS - SI(MOD-SP4): 24.4 ML/M^2
BH CV ECHO MEAS - SI(TEICH): 22.9 ML/M^2
BH CV ECHO MEAS - SV(AO): 550.4 ML
BH CV ECHO MEAS - SV(CUBED): 67.9 ML
BH CV ECHO MEAS - SV(LVOT): 71.6 ML
BH CV ECHO MEAS - SV(MOD-SP2): 53 ML
BH CV ECHO MEAS - SV(MOD-SP4): 46 ML
BH CV ECHO MEAS - SV(TEICH): 43.3 ML
BH CV ECHO MEAS - TAPSE (>1.6): 2.4 CM2
BH CV ECHO MEAS - TR MAX PG: 39 MMHG
BH CV ECHO MEAS - TR MAX VEL: 281.3 CM/SEC
BH CV ECHO MEASUREMENTS AVERAGE E/E' RATIO: 17.95
BH CV VAS BP RIGHT ARM: NORMAL MMHG
BH CV XLRA - RV BASE: 4.1 CM
BH CV XLRA - RV LENGTH: 5.3 CM
BH CV XLRA - RV MID: 2.5 CM
BH CV XLRA - TDI S': 14.2 CM/SEC
BILIRUB SERPL-MCNC: 1 MG/DL (ref 0.2–1.2)
BILIRUB SERPL-MCNC: 1.2 MG/DL (ref 0.2–1.2)
BILIRUB SERPL-MCNC: 1.2 MG/DL (ref 0.2–1.2)
BILIRUB SERPL-MCNC: 1.4 MG/DL (ref 0.2–1.2)
BILIRUB UR QL STRIP: NEGATIVE
BUN BLD-MCNC: 37 MG/DL (ref 8–23)
BUN BLD-MCNC: 40 MG/DL (ref 8–23)
BUN BLD-MCNC: 42 MG/DL (ref 8–23)
BUN BLD-MCNC: 47 MG/DL (ref 8–23)
BUN BLD-MCNC: 49 MG/DL (ref 8–23)
BUN BLD-MCNC: 54 MG/DL (ref 8–23)
BUN BLD-MCNC: 55 MG/DL (ref 8–23)
BUN/CREAT SERPL: 15.7 (ref 7–25)
BUN/CREAT SERPL: 16.7 (ref 7–25)
BUN/CREAT SERPL: 17.3 (ref 7–25)
BUN/CREAT SERPL: 18 (ref 7–25)
BUN/CREAT SERPL: 19 (ref 7–25)
BUN/CREAT SERPL: 19.6 (ref 7–25)
BUN/CREAT SERPL: 20.8 (ref 7–25)
BURR CELLS BLD QL SMEAR: ABNORMAL
BURR CELLS BLD QL SMEAR: NORMAL
CALCIUM SPEC-SCNC: 9.1 MG/DL (ref 8.6–10.5)
CALCIUM SPEC-SCNC: 9.4 MG/DL (ref 8.6–10.5)
CALCIUM SPEC-SCNC: 9.4 MG/DL (ref 8.6–10.5)
CALCIUM SPEC-SCNC: 9.5 MG/DL (ref 8.6–10.5)
CALCIUM SPEC-SCNC: 9.7 MG/DL (ref 8.6–10.5)
CHLORIDE SERPL-SCNC: 100 MMOL/L (ref 98–107)
CHLORIDE SERPL-SCNC: 101 MMOL/L (ref 98–107)
CHLORIDE SERPL-SCNC: 101 MMOL/L (ref 98–107)
CHLORIDE SERPL-SCNC: 103 MMOL/L (ref 98–107)
CHLORIDE SERPL-SCNC: 99 MMOL/L (ref 98–107)
CHOLEST SERPL-MCNC: 122 MG/DL (ref 0–200)
CLARITY UR: CLEAR
CO2 SERPL-SCNC: 20 MMOL/L (ref 22–29)
CO2 SERPL-SCNC: 21 MMOL/L (ref 22–29)
CO2 SERPL-SCNC: 23 MMOL/L (ref 22–29)
CO2 SERPL-SCNC: 23 MMOL/L (ref 22–29)
CO2 SERPL-SCNC: 25 MMOL/L (ref 22–29)
CO2 SERPL-SCNC: 26 MMOL/L (ref 22–29)
CO2 SERPL-SCNC: 28 MMOL/L (ref 22–29)
COLOR UR: YELLOW
CREAT BLD-MCNC: 2.22 MG/DL (ref 0.76–1.27)
CREAT BLD-MCNC: 2.31 MG/DL (ref 0.76–1.27)
CREAT BLD-MCNC: 2.58 MG/DL (ref 0.76–1.27)
CREAT BLD-MCNC: 2.6 MG/DL (ref 0.76–1.27)
CREAT BLD-MCNC: 2.61 MG/DL (ref 0.76–1.27)
CREAT BLD-MCNC: 2.67 MG/DL (ref 0.76–1.27)
CREAT BLD-MCNC: 2.8 MG/DL (ref 0.76–1.27)
CREAT UR-MCNC: 88.8 MG/DL
DEPRECATED RDW RBC AUTO: 51.3 FL (ref 37–54)
DEPRECATED RDW RBC AUTO: 52.2 FL (ref 37–54)
DEPRECATED RDW RBC AUTO: 52.5 FL (ref 37–54)
DEPRECATED RDW RBC AUTO: 52.7 FL (ref 37–54)
DEPRECATED RDW RBC AUTO: 52.7 FL (ref 37–54)
DEPRECATED RDW RBC AUTO: 53.6 FL (ref 37–54)
EOSINOPHIL # BLD AUTO: 0.01 10*3/MM3 (ref 0–0.4)
EOSINOPHIL # BLD AUTO: 0.03 10*3/MM3 (ref 0–0.4)
EOSINOPHIL # BLD AUTO: 0.05 10*3/MM3 (ref 0–0.4)
EOSINOPHIL # BLD AUTO: 0.09 10*3/MM3 (ref 0–0.4)
EOSINOPHIL # BLD AUTO: 0.13 10*3/MM3 (ref 0–0.4)
EOSINOPHIL # BLD MANUAL: 0 10*3/MM3 (ref 0–0.4)
EOSINOPHIL NFR BLD AUTO: 0.1 % (ref 0.3–6.2)
EOSINOPHIL NFR BLD AUTO: 0.3 % (ref 0.3–6.2)
EOSINOPHIL NFR BLD AUTO: 0.6 % (ref 0.3–6.2)
EOSINOPHIL NFR BLD AUTO: 1.3 % (ref 0.3–6.2)
EOSINOPHIL NFR BLD AUTO: 1.7 % (ref 0.3–6.2)
EOSINOPHIL NFR BLD MANUAL: 0 % (ref 0.3–6.2)
ERYTHROCYTE [DISTWIDTH] IN BLOOD BY AUTOMATED COUNT: 15.1 % (ref 12.3–15.4)
ERYTHROCYTE [DISTWIDTH] IN BLOOD BY AUTOMATED COUNT: 15.1 % (ref 12.3–15.4)
ERYTHROCYTE [DISTWIDTH] IN BLOOD BY AUTOMATED COUNT: 15.3 % (ref 12.3–15.4)
ERYTHROCYTE [DISTWIDTH] IN BLOOD BY AUTOMATED COUNT: 15.5 % (ref 12.3–15.4)
ERYTHROCYTE [DISTWIDTH] IN BLOOD BY AUTOMATED COUNT: 15.5 % (ref 12.3–15.4)
ERYTHROCYTE [DISTWIDTH] IN BLOOD BY AUTOMATED COUNT: 15.9 % (ref 12.3–15.4)
FERRITIN SERPL-MCNC: 345.7 NG/ML (ref 30–400)
FIBRINOGEN PPP-MCNC: 264 MG/DL (ref 220–400)
FOLATE SERPL-MCNC: >20 NG/ML (ref 4.78–24.2)
FSP PPP LA-ACNC: NORMAL
GAMMA GLOB SERPL ELPH-MCNC: 1 G/DL (ref 0.4–1.8)
GFR SERPL CREATININE-BSD FRML MDRD: 22 ML/MIN/1.73
GFR SERPL CREATININE-BSD FRML MDRD: 23 ML/MIN/1.73
GFR SERPL CREATININE-BSD FRML MDRD: 24 ML/MIN/1.73
GFR SERPL CREATININE-BSD FRML MDRD: 27 ML/MIN/1.73
GFR SERPL CREATININE-BSD FRML MDRD: 29 ML/MIN/1.73
GLOBULIN SER CALC-MCNC: 2.9 G/DL (ref 2.2–3.9)
GLOBULIN UR ELPH-MCNC: 2.6 GM/DL
GLOBULIN UR ELPH-MCNC: 2.7 GM/DL
GLOBULIN UR ELPH-MCNC: 2.8 GM/DL
GLOBULIN UR ELPH-MCNC: 3.1 GM/DL
GLUCOSE BLD-MCNC: 113 MG/DL (ref 65–99)
GLUCOSE BLD-MCNC: 128 MG/DL (ref 65–99)
GLUCOSE BLD-MCNC: 131 MG/DL (ref 65–99)
GLUCOSE BLD-MCNC: 137 MG/DL (ref 65–99)
GLUCOSE BLD-MCNC: 147 MG/DL (ref 65–99)
GLUCOSE BLD-MCNC: 155 MG/DL (ref 65–99)
GLUCOSE BLD-MCNC: 164 MG/DL (ref 65–99)
GLUCOSE BLDC GLUCOMTR-MCNC: 123 MG/DL (ref 70–130)
GLUCOSE BLDC GLUCOMTR-MCNC: 151 MG/DL (ref 70–130)
GLUCOSE UR STRIP-MCNC: NEGATIVE MG/DL
HAV IGM SERPL QL IA: NORMAL
HBA1C MFR BLD: 5.8 % (ref 4.8–5.6)
HBV CORE IGM SERPL QL IA: NORMAL
HBV SURFACE AG SERPL QL IA: NORMAL
HCT VFR BLD AUTO: 32.6 % (ref 37.5–51)
HCT VFR BLD AUTO: 33.1 % (ref 37.5–51)
HCT VFR BLD AUTO: 33.9 % (ref 37.5–51)
HCT VFR BLD AUTO: 34.3 % (ref 37.5–51)
HCT VFR BLD AUTO: 34.5 % (ref 37.5–51)
HCT VFR BLD AUTO: 35.2 % (ref 37.5–51)
HCV AB SER DONR QL: NORMAL
HDLC SERPL-MCNC: 41 MG/DL (ref 40–60)
HGB BLD-MCNC: 10 G/DL (ref 13–17.7)
HGB BLD-MCNC: 10.3 G/DL (ref 13–17.7)
HGB BLD-MCNC: 10.6 G/DL (ref 13–17.7)
HGB BLD-MCNC: 10.8 G/DL (ref 13–17.7)
HGB BLD-MCNC: 10.8 G/DL (ref 13–17.7)
HGB BLD-MCNC: 10.9 G/DL (ref 13–17.7)
HGB UR QL STRIP.AUTO: NEGATIVE
HOLD SPECIMEN: NORMAL
IGA SERPL-MCNC: 272 MG/DL (ref 61–437)
IGG SERPL-MCNC: 1057 MG/DL (ref 700–1600)
IGM SERPL-MCNC: 36 MG/DL (ref 15–143)
IMM GRANULOCYTES # BLD AUTO: 0.03 10*3/MM3 (ref 0–0.05)
IMM GRANULOCYTES # BLD AUTO: 0.04 10*3/MM3 (ref 0–0.05)
IMM GRANULOCYTES # BLD AUTO: 0.04 10*3/MM3 (ref 0–0.05)
IMM GRANULOCYTES # BLD AUTO: 0.05 10*3/MM3 (ref 0–0.05)
IMM GRANULOCYTES # BLD AUTO: 0.05 10*3/MM3 (ref 0–0.05)
IMM GRANULOCYTES NFR BLD AUTO: 0.4 % (ref 0–0.5)
IMM GRANULOCYTES NFR BLD AUTO: 0.5 % (ref 0–0.5)
IMM GRANULOCYTES NFR BLD AUTO: 0.5 % (ref 0–0.5)
IMM GRANULOCYTES NFR BLD AUTO: 0.6 % (ref 0–0.5)
IMM GRANULOCYTES NFR BLD AUTO: 0.6 % (ref 0–0.5)
INR PPP: 2.77 (ref 0.85–1.16)
INR PPP: 3.64 (ref 0.85–1.16)
INR PPP: 4.1 (ref 0.85–1.16)
INR PPP: 4.18 (ref 0.85–1.16)
INR PPP: 4.93 (ref 0.85–1.16)
INTERPRETATION UR IFE-IMP: NORMAL
IRON 24H UR-MRATE: 42 MCG/DL (ref 59–158)
IRON SATN MFR SERPL: 15 % (ref 20–50)
KAPPA LC SERPL-MCNC: 70 MG/L (ref 3.3–19.4)
KAPPA LC UR-MCNC: 165.61 MG/L (ref 0.63–113.79)
KAPPA LC/LAMBDA SER: 1.42 {RATIO} (ref 0.26–1.65)
KAPPA LC/LAMBDA UR: 6.8 {RATIO} (ref 1.03–31.76)
KETONES UR QL STRIP: NEGATIVE
LAMBDA LC FREE SERPL-MCNC: 49.4 MG/L (ref 5.7–26.3)
LAMBDA LC UR-MCNC: 24.35 MG/L (ref 0.47–11.77)
LDH SERPL-CCNC: 315 U/L (ref 135–225)
LDLC SERPL CALC-MCNC: 60 MG/DL (ref 0–100)
LDLC/HDLC SERPL: 1.47 {RATIO}
LEFT ATRIUM VOLUME INDEX: 60.4 ML/M^2
LEFT ATRIUM VOLUME: 114 ML
LEUKOCYTE ESTERASE UR QL STRIP.AUTO: NEGATIVE
LYMPHOCYTES # BLD AUTO: 0.42 10*3/MM3 (ref 0.7–3.1)
LYMPHOCYTES # BLD AUTO: 0.46 10*3/MM3 (ref 0.7–3.1)
LYMPHOCYTES # BLD AUTO: 0.57 10*3/MM3 (ref 0.7–3.1)
LYMPHOCYTES # BLD AUTO: 0.67 10*3/MM3 (ref 0.7–3.1)
LYMPHOCYTES # BLD AUTO: 0.82 10*3/MM3 (ref 0.7–3.1)
LYMPHOCYTES # BLD MANUAL: 0.51 10*3/MM3 (ref 0.7–3.1)
LYMPHOCYTES NFR BLD AUTO: 10.6 % (ref 19.6–45.3)
LYMPHOCYTES NFR BLD AUTO: 5.4 % (ref 19.6–45.3)
LYMPHOCYTES NFR BLD AUTO: 5.6 % (ref 19.6–45.3)
LYMPHOCYTES NFR BLD AUTO: 6.9 % (ref 19.6–45.3)
LYMPHOCYTES NFR BLD AUTO: 9.6 % (ref 19.6–45.3)
LYMPHOCYTES NFR BLD MANUAL: 2 % (ref 5–12)
LYMPHOCYTES NFR BLD MANUAL: 7 % (ref 19.6–45.3)
Lab: NORMAL
M-SPIKE: NORMAL G/DL
MAGNESIUM SERPL-MCNC: 2 MG/DL (ref 1.6–2.4)
MAGNESIUM SERPL-MCNC: 2 MG/DL (ref 1.6–2.4)
MAGNESIUM SERPL-MCNC: 2.1 MG/DL (ref 1.6–2.4)
MAGNESIUM SERPL-MCNC: 2.2 MG/DL (ref 1.6–2.4)
MAXIMAL PREDICTED HEART RATE: 139 BPM
MCH RBC QN AUTO: 29.2 PG (ref 26.6–33)
MCH RBC QN AUTO: 29.2 PG (ref 26.6–33)
MCH RBC QN AUTO: 29.3 PG (ref 26.6–33)
MCH RBC QN AUTO: 29.3 PG (ref 26.6–33)
MCH RBC QN AUTO: 29.6 PG (ref 26.6–33)
MCH RBC QN AUTO: 29.7 PG (ref 26.6–33)
MCHC RBC AUTO-ENTMCNC: 30.7 G/DL (ref 31.5–35.7)
MCHC RBC AUTO-ENTMCNC: 30.7 G/DL (ref 31.5–35.7)
MCHC RBC AUTO-ENTMCNC: 31.1 G/DL (ref 31.5–35.7)
MCHC RBC AUTO-ENTMCNC: 31.3 G/DL (ref 31.5–35.7)
MCHC RBC AUTO-ENTMCNC: 31.3 G/DL (ref 31.5–35.7)
MCHC RBC AUTO-ENTMCNC: 31.8 G/DL (ref 31.5–35.7)
MCV RBC AUTO: 93.5 FL (ref 79–97)
MCV RBC AUTO: 93.5 FL (ref 79–97)
MCV RBC AUTO: 94 FL (ref 79–97)
MCV RBC AUTO: 94.7 FL (ref 79–97)
MCV RBC AUTO: 95 FL (ref 79–97)
MCV RBC AUTO: 95.1 FL (ref 79–97)
MONOCYTES # BLD AUTO: 0.15 10*3/MM3 (ref 0.1–0.9)
MONOCYTES # BLD AUTO: 0.39 10*3/MM3 (ref 0.1–0.9)
MONOCYTES # BLD AUTO: 0.45 10*3/MM3 (ref 0.1–0.9)
MONOCYTES # BLD AUTO: 0.51 10*3/MM3 (ref 0.1–0.9)
MONOCYTES # BLD AUTO: 0.6 10*3/MM3 (ref 0.1–0.9)
MONOCYTES # BLD AUTO: 0.63 10*3/MM3 (ref 0.1–0.9)
MONOCYTES NFR BLD AUTO: 5.8 % (ref 5–12)
MONOCYTES NFR BLD AUTO: 5.8 % (ref 5–12)
MONOCYTES NFR BLD AUTO: 6.1 % (ref 5–12)
MONOCYTES NFR BLD AUTO: 7.3 % (ref 5–12)
MONOCYTES NFR BLD AUTO: 7.8 % (ref 5–12)
NEUTROPHILS # BLD AUTO: 5.69 10*3/MM3 (ref 1.7–7)
NEUTROPHILS # BLD AUTO: 5.74 10*3/MM3 (ref 1.7–7)
NEUTROPHILS # BLD AUTO: 6.18 10*3/MM3 (ref 1.7–7)
NEUTROPHILS # BLD AUTO: 6.66 10*3/MM3 (ref 1.7–7)
NEUTROPHILS # BLD AUTO: 6.76 10*3/MM3 (ref 1.7–7)
NEUTROPHILS # BLD AUTO: 8.94 10*3/MM3 (ref 1.7–7)
NEUTROPHILS NFR BLD AUTO: 80 % (ref 42.7–76)
NEUTROPHILS NFR BLD AUTO: 82 % (ref 42.7–76)
NEUTROPHILS NFR BLD AUTO: 85.1 % (ref 42.7–76)
NEUTROPHILS NFR BLD AUTO: 86.5 % (ref 42.7–76)
NEUTROPHILS NFR BLD AUTO: 87.2 % (ref 42.7–76)
NEUTROPHILS NFR BLD MANUAL: 91 % (ref 42.7–76)
NITRITE UR QL STRIP: NEGATIVE
NRBC BLD AUTO-RTO: 0 /100 WBC (ref 0–0.2)
NT-PROBNP SERPL-MCNC: ABNORMAL PG/ML (ref 5–1800)
PH UR STRIP.AUTO: <=5 [PH] (ref 5–8)
PHOSPHATE SERPL-MCNC: 4.2 MG/DL (ref 2.5–4.5)
PLAT MORPH BLD: NORMAL
PLAT MORPH BLD: NORMAL
PLATELET # BLD AUTO: 105 10*3/MM3 (ref 140–450)
PLATELET # BLD AUTO: 114 10*3/MM3 (ref 140–450)
PLATELET # BLD AUTO: 117 10*3/MM3 (ref 140–450)
PLATELET # BLD AUTO: 119 10*3/MM3 (ref 140–450)
PLATELET # BLD AUTO: 123 10*3/MM3 (ref 140–450)
PLATELET # BLD AUTO: 135 10*3/MM3 (ref 140–450)
PMV BLD AUTO: 13.3 FL (ref 6–12)
PMV BLD AUTO: 13.3 FL (ref 6–12)
PMV BLD AUTO: 13.4 FL (ref 6–12)
POLYCHROMASIA BLD QL SMEAR: NORMAL
POTASSIUM BLD-SCNC: 3.2 MMOL/L (ref 3.5–5.2)
POTASSIUM BLD-SCNC: 3.7 MMOL/L (ref 3.5–5.2)
POTASSIUM BLD-SCNC: 3.9 MMOL/L (ref 3.5–5.2)
POTASSIUM BLD-SCNC: 4 MMOL/L (ref 3.5–5.2)
POTASSIUM BLD-SCNC: 4.2 MMOL/L (ref 3.5–5.2)
POTASSIUM BLD-SCNC: 4.3 MMOL/L (ref 3.5–5.2)
POTASSIUM BLD-SCNC: 5.1 MMOL/L (ref 3.5–5.2)
PROT PATTERN SERPL IFE-IMP: NORMAL
PROT SERPL-MCNC: 6 G/DL (ref 6–8.5)
PROT SERPL-MCNC: 6.2 G/DL (ref 6–8.5)
PROT SERPL-MCNC: 6.3 G/DL (ref 6–8.5)
PROT SERPL-MCNC: 6.5 G/DL (ref 6–8.5)
PROT SERPL-MCNC: 6.8 G/DL (ref 6–8.5)
PROT UR QL STRIP: NEGATIVE
PROT UR-MCNC: 20.5 MG/DL
PROTHROMBIN TIME: 28.2 SECONDS (ref 11.2–14.3)
PROTHROMBIN TIME: 34.8 SECONDS (ref 11.2–14.3)
PROTHROMBIN TIME: 38.2 SECONDS (ref 11.2–14.3)
PROTHROMBIN TIME: 38.9 SECONDS (ref 11.2–14.3)
PROTHROMBIN TIME: 44.1 SECONDS (ref 11.2–14.3)
PTH-INTACT SERPL-MCNC: 120.1 PG/ML (ref 15–65)
RBC # BLD AUTO: 3.43 10*6/MM3 (ref 4.14–5.8)
RBC # BLD AUTO: 3.52 10*6/MM3 (ref 4.14–5.8)
RBC # BLD AUTO: 3.58 10*6/MM3 (ref 4.14–5.8)
RBC # BLD AUTO: 3.67 10*6/MM3 (ref 4.14–5.8)
RBC # BLD AUTO: 3.69 10*6/MM3 (ref 4.14–5.8)
RBC # BLD AUTO: 3.7 10*6/MM3 (ref 4.14–5.8)
SODIUM BLD-SCNC: 139 MMOL/L (ref 136–145)
SODIUM BLD-SCNC: 140 MMOL/L (ref 136–145)
SODIUM BLD-SCNC: 140 MMOL/L (ref 136–145)
SODIUM BLD-SCNC: 141 MMOL/L (ref 136–145)
SODIUM BLD-SCNC: 141 MMOL/L (ref 136–145)
SODIUM BLD-SCNC: 143 MMOL/L (ref 136–145)
SODIUM BLD-SCNC: 143 MMOL/L (ref 136–145)
SP GR UR STRIP: 1.01 (ref 1–1.03)
STRESS TARGET HR: 118 BPM
TIBC SERPL-MCNC: 285 MCG/DL (ref 298–536)
TRANSFERRIN SERPL-MCNC: 191 MG/DL (ref 200–360)
TRIGL SERPL-MCNC: 103 MG/DL (ref 0–150)
TROPONIN T SERPL-MCNC: 0.68 NG/ML (ref 0–0.03)
TROPONIN T SERPL-MCNC: 0.69 NG/ML (ref 0–0.03)
TROPONIN T SERPL-MCNC: 0.69 NG/ML (ref 0–0.03)
TSH SERPL DL<=0.05 MIU/L-ACNC: 2.31 UIU/ML (ref 0.27–4.2)
URATE SERPL-MCNC: 11 MG/DL (ref 3.4–7)
UROBILINOGEN UR QL STRIP: NORMAL
VIT B12 BLD-MCNC: 545 PG/ML (ref 211–946)
VLDLC SERPL-MCNC: 20.6 MG/DL
WBC MORPH BLD: NORMAL
WBC MORPH BLD: NORMAL
WBC NRBC COR # BLD: 10.25 10*3/MM3 (ref 3.4–10.8)
WBC NRBC COR # BLD: 6.69 10*3/MM3 (ref 3.4–10.8)
WBC NRBC COR # BLD: 7 10*3/MM3 (ref 3.4–10.8)
WBC NRBC COR # BLD: 7.32 10*3/MM3 (ref 3.4–10.8)
WBC NRBC COR # BLD: 7.73 10*3/MM3 (ref 3.4–10.8)
WBC NRBC COR # BLD: 7.81 10*3/MM3 (ref 3.4–10.8)

## 2020-01-01 PROCEDURE — 83735 ASSAY OF MAGNESIUM: CPT | Performed by: FAMILY MEDICINE

## 2020-01-01 PROCEDURE — 83883 ASSAY NEPHELOMETRY NOT SPEC: CPT | Performed by: INTERNAL MEDICINE

## 2020-01-01 PROCEDURE — 82140 ASSAY OF AMMONIA: CPT | Performed by: INTERNAL MEDICINE

## 2020-01-01 PROCEDURE — 25010000002 ALBUMIN HUMAN 25% PER 50 ML: Performed by: INTERNAL MEDICINE

## 2020-01-01 PROCEDURE — 80053 COMPREHEN METABOLIC PANEL: CPT | Performed by: INTERNAL MEDICINE

## 2020-01-01 PROCEDURE — 92610 EVALUATE SWALLOWING FUNCTION: CPT

## 2020-01-01 PROCEDURE — 82962 GLUCOSE BLOOD TEST: CPT

## 2020-01-01 PROCEDURE — 85025 COMPLETE CBC W/AUTO DIFF WBC: CPT | Performed by: INTERNAL MEDICINE

## 2020-01-01 PROCEDURE — 84100 ASSAY OF PHOSPHORUS: CPT | Performed by: INTERNAL MEDICINE

## 2020-01-01 PROCEDURE — 85362 FIBRIN DEGRADATION PRODUCTS: CPT | Performed by: INTERNAL MEDICINE

## 2020-01-01 PROCEDURE — 71046 X-RAY EXAM CHEST 2 VIEWS: CPT

## 2020-01-01 PROCEDURE — 85610 PROTHROMBIN TIME: CPT | Performed by: PHYSICIAN ASSISTANT

## 2020-01-01 PROCEDURE — 85610 PROTHROMBIN TIME: CPT | Performed by: INTERNAL MEDICINE

## 2020-01-01 PROCEDURE — 80074 ACUTE HEPATITIS PANEL: CPT | Performed by: PHYSICIAN ASSISTANT

## 2020-01-01 PROCEDURE — 80053 COMPREHEN METABOLIC PANEL: CPT | Performed by: FAMILY MEDICINE

## 2020-01-01 PROCEDURE — 99232 SBSQ HOSP IP/OBS MODERATE 35: CPT | Performed by: INTERNAL MEDICINE

## 2020-01-01 PROCEDURE — 93306 TTE W/DOPPLER COMPLETE: CPT | Performed by: INTERNAL MEDICINE

## 2020-01-01 PROCEDURE — 84550 ASSAY OF BLOOD/URIC ACID: CPT | Performed by: INTERNAL MEDICINE

## 2020-01-01 PROCEDURE — 82306 VITAMIN D 25 HYDROXY: CPT | Performed by: INTERNAL MEDICINE

## 2020-01-01 PROCEDURE — 85730 THROMBOPLASTIN TIME PARTIAL: CPT | Performed by: INTERNAL MEDICINE

## 2020-01-01 PROCEDURE — 84484 ASSAY OF TROPONIN QUANT: CPT | Performed by: PHYSICIAN ASSISTANT

## 2020-01-01 PROCEDURE — 81003 URINALYSIS AUTO W/O SCOPE: CPT | Performed by: NURSE PRACTITIONER

## 2020-01-01 PROCEDURE — 83735 ASSAY OF MAGNESIUM: CPT | Performed by: INTERNAL MEDICINE

## 2020-01-01 PROCEDURE — 84484 ASSAY OF TROPONIN QUANT: CPT | Performed by: INTERNAL MEDICINE

## 2020-01-01 PROCEDURE — 74176 CT ABD & PELVIS W/O CONTRAST: CPT

## 2020-01-01 PROCEDURE — 82607 VITAMIN B-12: CPT | Performed by: PHYSICIAN ASSISTANT

## 2020-01-01 PROCEDURE — 80048 BASIC METABOLIC PNL TOTAL CA: CPT | Performed by: INTERNAL MEDICINE

## 2020-01-01 PROCEDURE — 85025 COMPLETE CBC W/AUTO DIFF WBC: CPT | Performed by: FAMILY MEDICINE

## 2020-01-01 PROCEDURE — 82746 ASSAY OF FOLIC ACID SERUM: CPT | Performed by: PHYSICIAN ASSISTANT

## 2020-01-01 PROCEDURE — 85025 COMPLETE CBC W/AUTO DIFF WBC: CPT | Performed by: PHYSICIAN ASSISTANT

## 2020-01-01 PROCEDURE — 83970 ASSAY OF PARATHORMONE: CPT | Performed by: INTERNAL MEDICINE

## 2020-01-01 PROCEDURE — 86335 IMMUNFIX E-PHORSIS/URINE/CSF: CPT | Performed by: INTERNAL MEDICINE

## 2020-01-01 PROCEDURE — 99221 1ST HOSP IP/OBS SF/LOW 40: CPT | Performed by: PHYSICIAN ASSISTANT

## 2020-01-01 PROCEDURE — 85610 PROTHROMBIN TIME: CPT | Performed by: FAMILY MEDICINE

## 2020-01-01 PROCEDURE — 92526 ORAL FUNCTION THERAPY: CPT

## 2020-01-01 PROCEDURE — 82728 ASSAY OF FERRITIN: CPT | Performed by: PHYSICIAN ASSISTANT

## 2020-01-01 PROCEDURE — 84156 ASSAY OF PROTEIN URINE: CPT | Performed by: INTERNAL MEDICINE

## 2020-01-01 PROCEDURE — 93306 TTE W/DOPPLER COMPLETE: CPT

## 2020-01-01 PROCEDURE — 86334 IMMUNOFIX E-PHORESIS SERUM: CPT | Performed by: INTERNAL MEDICINE

## 2020-01-01 PROCEDURE — 84484 ASSAY OF TROPONIN QUANT: CPT | Performed by: FAMILY MEDICINE

## 2020-01-01 PROCEDURE — 99239 HOSP IP/OBS DSCHRG MGMT >30: CPT | Performed by: NURSE PRACTITIONER

## 2020-01-01 PROCEDURE — 93010 ELECTROCARDIOGRAM REPORT: CPT | Performed by: INTERNAL MEDICINE

## 2020-01-01 PROCEDURE — 25010000002 SULFUR HEXAFLUORIDE MICROSPH 60.7-25 MG RECONSTITUTED SUSPENSION: Performed by: PHYSICIAN ASSISTANT

## 2020-01-01 PROCEDURE — P9047 ALBUMIN (HUMAN), 25%, 50ML: HCPCS | Performed by: INTERNAL MEDICINE

## 2020-01-01 PROCEDURE — 82570 ASSAY OF URINE CREATININE: CPT | Performed by: INTERNAL MEDICINE

## 2020-01-01 PROCEDURE — 99233 SBSQ HOSP IP/OBS HIGH 50: CPT | Performed by: INTERNAL MEDICINE

## 2020-01-01 PROCEDURE — 83540 ASSAY OF IRON: CPT | Performed by: PHYSICIAN ASSISTANT

## 2020-01-01 PROCEDURE — 25010000002 HALOPERIDOL LACTATE PER 5 MG: Performed by: FAMILY MEDICINE

## 2020-01-01 PROCEDURE — 80061 LIPID PANEL: CPT | Performed by: FAMILY MEDICINE

## 2020-01-01 PROCEDURE — 84443 ASSAY THYROID STIM HORMONE: CPT | Performed by: FAMILY MEDICINE

## 2020-01-01 PROCEDURE — 83036 HEMOGLOBIN GLYCOSYLATED A1C: CPT | Performed by: FAMILY MEDICINE

## 2020-01-01 PROCEDURE — 83880 ASSAY OF NATRIURETIC PEPTIDE: CPT | Performed by: FAMILY MEDICINE

## 2020-01-01 PROCEDURE — 85007 BL SMEAR W/DIFF WBC COUNT: CPT | Performed by: INTERNAL MEDICINE

## 2020-01-01 PROCEDURE — 25010000002 HYDROMORPHONE PER 4 MG: Performed by: FAMILY MEDICINE

## 2020-01-01 PROCEDURE — 94640 AIRWAY INHALATION TREATMENT: CPT

## 2020-01-01 PROCEDURE — 85384 FIBRINOGEN ACTIVITY: CPT | Performed by: INTERNAL MEDICINE

## 2020-01-01 PROCEDURE — 99223 1ST HOSP IP/OBS HIGH 75: CPT | Performed by: INTERNAL MEDICINE

## 2020-01-01 PROCEDURE — 25010000002 MORPHINE PER 10 MG: Performed by: INTERNAL MEDICINE

## 2020-01-01 PROCEDURE — 84165 PROTEIN E-PHORESIS SERUM: CPT | Performed by: INTERNAL MEDICINE

## 2020-01-01 PROCEDURE — 85007 BL SMEAR W/DIFF WBC COUNT: CPT | Performed by: FAMILY MEDICINE

## 2020-01-01 PROCEDURE — 99222 1ST HOSP IP/OBS MODERATE 55: CPT | Performed by: INTERNAL MEDICINE

## 2020-01-01 PROCEDURE — 93005 ELECTROCARDIOGRAM TRACING: CPT | Performed by: PHYSICIAN ASSISTANT

## 2020-01-01 PROCEDURE — 85027 COMPLETE CBC AUTOMATED: CPT | Performed by: FAMILY MEDICINE

## 2020-01-01 PROCEDURE — 82784 ASSAY IGA/IGD/IGG/IGM EACH: CPT | Performed by: INTERNAL MEDICINE

## 2020-01-01 PROCEDURE — 83615 LACTATE (LD) (LDH) ENZYME: CPT | Performed by: INTERNAL MEDICINE

## 2020-01-01 PROCEDURE — 99231 SBSQ HOSP IP/OBS SF/LOW 25: CPT | Performed by: INTERNAL MEDICINE

## 2020-01-01 PROCEDURE — 84466 ASSAY OF TRANSFERRIN: CPT | Performed by: PHYSICIAN ASSISTANT

## 2020-01-01 PROCEDURE — 76775 US EXAM ABDO BACK WALL LIM: CPT

## 2020-01-01 PROCEDURE — 94799 UNLISTED PULMONARY SVC/PX: CPT

## 2020-01-01 PROCEDURE — 76705 ECHO EXAM OF ABDOMEN: CPT

## 2020-01-01 RX ORDER — IPRATROPIUM BROMIDE AND ALBUTEROL SULFATE 2.5; .5 MG/3ML; MG/3ML
3 SOLUTION RESPIRATORY (INHALATION) EVERY 4 HOURS PRN
Status: DISCONTINUED | OUTPATIENT
Start: 2020-01-01 | End: 2020-01-01 | Stop reason: SDUPTHER

## 2020-01-01 RX ORDER — RIVASTIGMINE 4.6 MG/24H
1 PATCH, EXTENDED RELEASE TRANSDERMAL DAILY
Status: DISCONTINUED | OUTPATIENT
Start: 2020-01-01 | End: 2020-01-01 | Stop reason: HOSPADM

## 2020-01-01 RX ORDER — OLANZAPINE 10 MG/1
2.5 INJECTION, POWDER, LYOPHILIZED, FOR SOLUTION INTRAMUSCULAR ONCE
Status: COMPLETED | OUTPATIENT
Start: 2020-01-01 | End: 2020-01-01

## 2020-01-01 RX ORDER — SODIUM CHLORIDE 0.9 % (FLUSH) 0.9 %
10 SYRINGE (ML) INJECTION EVERY 12 HOURS SCHEDULED
Status: DISCONTINUED | OUTPATIENT
Start: 2020-01-01 | End: 2020-01-01 | Stop reason: HOSPADM

## 2020-01-01 RX ORDER — HALOPERIDOL 5 MG/ML
0.5 INJECTION INTRAMUSCULAR EVERY 6 HOURS PRN
Start: 2020-01-01

## 2020-01-01 RX ORDER — BISACODYL 5 MG/1
5 TABLET, DELAYED RELEASE ORAL DAILY PRN
Status: DISCONTINUED | OUTPATIENT
Start: 2020-01-01 | End: 2020-01-01 | Stop reason: HOSPADM

## 2020-01-01 RX ORDER — ATORVASTATIN CALCIUM 40 MG/1
80 TABLET, FILM COATED ORAL NIGHTLY
Status: DISCONTINUED | OUTPATIENT
Start: 2020-01-01 | End: 2020-01-01 | Stop reason: HOSPADM

## 2020-01-01 RX ORDER — QUETIAPINE FUMARATE 25 MG/1
25 TABLET, FILM COATED ORAL NIGHTLY PRN
Status: DISCONTINUED | OUTPATIENT
Start: 2020-01-01 | End: 2020-01-01 | Stop reason: HOSPADM

## 2020-01-01 RX ORDER — FOLIC ACID 1 MG/1
1 TABLET ORAL DAILY
Status: DISCONTINUED | OUTPATIENT
Start: 2020-01-01 | End: 2020-01-01 | Stop reason: HOSPADM

## 2020-01-01 RX ORDER — ASPIRIN 81 MG/1
162 TABLET, CHEWABLE ORAL ONCE
Status: COMPLETED | OUTPATIENT
Start: 2020-01-01 | End: 2020-01-01

## 2020-01-01 RX ORDER — BUMETANIDE 0.25 MG/ML
1 INJECTION INTRAMUSCULAR; INTRAVENOUS ONCE
Status: COMPLETED | OUTPATIENT
Start: 2020-01-01 | End: 2020-01-01

## 2020-01-01 RX ORDER — BUMETANIDE 0.25 MG/ML
2 INJECTION INTRAMUSCULAR; INTRAVENOUS ONCE
Status: COMPLETED | OUTPATIENT
Start: 2020-01-01 | End: 2020-01-01

## 2020-01-01 RX ORDER — CHOLECALCIFEROL (VITAMIN D3) 125 MCG
5 CAPSULE ORAL NIGHTLY PRN
Start: 2020-01-01

## 2020-01-01 RX ORDER — BISACODYL 10 MG
10 SUPPOSITORY, RECTAL RECTAL DAILY PRN
Status: DISCONTINUED | OUTPATIENT
Start: 2020-01-01 | End: 2020-01-01 | Stop reason: HOSPADM

## 2020-01-01 RX ORDER — DOCUSATE SODIUM 100 MG/1
100 CAPSULE, LIQUID FILLED ORAL 2 TIMES DAILY PRN
Status: DISCONTINUED | OUTPATIENT
Start: 2020-01-01 | End: 2020-01-01 | Stop reason: HOSPADM

## 2020-01-01 RX ORDER — MORPHINE SULFATE 2 MG/ML
1 INJECTION, SOLUTION INTRAMUSCULAR; INTRAVENOUS EVERY 4 HOURS PRN
Status: DISCONTINUED | OUTPATIENT
Start: 2020-01-01 | End: 2020-01-01

## 2020-01-01 RX ORDER — MAGNESIUM SULFATE HEPTAHYDRATE 40 MG/ML
4 INJECTION, SOLUTION INTRAVENOUS AS NEEDED
Status: DISCONTINUED | OUTPATIENT
Start: 2020-01-01 | End: 2020-01-01 | Stop reason: HOSPADM

## 2020-01-01 RX ORDER — PHENAZOPYRIDINE HYDROCHLORIDE 100 MG/1
100 TABLET, FILM COATED ORAL
Status: DISCONTINUED | OUTPATIENT
Start: 2020-01-01 | End: 2020-01-01 | Stop reason: HOSPADM

## 2020-01-01 RX ORDER — ACETAMINOPHEN 160 MG/5ML
650 SOLUTION ORAL EVERY 4 HOURS PRN
Status: DISCONTINUED | OUTPATIENT
Start: 2020-01-01 | End: 2020-01-01 | Stop reason: HOSPADM

## 2020-01-01 RX ORDER — ALBUMIN (HUMAN) 12.5 G/50ML
25 SOLUTION INTRAVENOUS ONCE
Status: COMPLETED | OUTPATIENT
Start: 2020-01-01 | End: 2020-01-01

## 2020-01-01 RX ORDER — ONDANSETRON 4 MG/1
4 TABLET, FILM COATED ORAL EVERY 6 HOURS PRN
Status: DISCONTINUED | OUTPATIENT
Start: 2020-01-01 | End: 2020-01-01 | Stop reason: HOSPADM

## 2020-01-01 RX ORDER — POTASSIUM CHLORIDE 7.45 MG/ML
10 INJECTION INTRAVENOUS
Status: DISCONTINUED | OUTPATIENT
Start: 2020-01-01 | End: 2020-01-01

## 2020-01-01 RX ORDER — CHOLECALCIFEROL (VITAMIN D3) 125 MCG
5 CAPSULE ORAL NIGHTLY PRN
Status: DISCONTINUED | OUTPATIENT
Start: 2020-01-01 | End: 2020-01-01 | Stop reason: HOSPADM

## 2020-01-01 RX ORDER — ACETAMINOPHEN 325 MG/1
650 TABLET ORAL EVERY 4 HOURS PRN
Start: 2020-01-01

## 2020-01-01 RX ORDER — ONDANSETRON 2 MG/ML
4 INJECTION INTRAMUSCULAR; INTRAVENOUS EVERY 6 HOURS PRN
Status: DISCONTINUED | OUTPATIENT
Start: 2020-01-01 | End: 2020-01-01 | Stop reason: HOSPADM

## 2020-01-01 RX ORDER — BISACODYL 10 MG
10 SUPPOSITORY, RECTAL RECTAL DAILY PRN
Start: 2020-01-01

## 2020-01-01 RX ORDER — BUMETANIDE 0.25 MG/ML
1 INJECTION INTRAMUSCULAR; INTRAVENOUS ONCE
Status: DISCONTINUED | OUTPATIENT
Start: 2020-01-01 | End: 2020-01-01

## 2020-01-01 RX ORDER — ASPIRIN 81 MG/1
162 TABLET, CHEWABLE ORAL ONCE
Status: DISCONTINUED | OUTPATIENT
Start: 2020-01-01 | End: 2020-01-01 | Stop reason: ALTCHOICE

## 2020-01-01 RX ORDER — HYDROMORPHONE HYDROCHLORIDE 1 MG/ML
0.25 INJECTION, SOLUTION INTRAMUSCULAR; INTRAVENOUS; SUBCUTANEOUS
Status: DISCONTINUED | OUTPATIENT
Start: 2020-01-01 | End: 2020-01-01 | Stop reason: HOSPADM

## 2020-01-01 RX ORDER — SODIUM CHLORIDE 0.9 % (FLUSH) 0.9 %
10 SYRINGE (ML) INJECTION AS NEEDED
Status: DISCONTINUED | OUTPATIENT
Start: 2020-01-01 | End: 2020-01-01 | Stop reason: HOSPADM

## 2020-01-01 RX ORDER — IPRATROPIUM BROMIDE AND ALBUTEROL SULFATE 2.5; .5 MG/3ML; MG/3ML
3 SOLUTION RESPIRATORY (INHALATION) EVERY 4 HOURS PRN
Status: DISCONTINUED | OUTPATIENT
Start: 2020-01-01 | End: 2020-01-01 | Stop reason: HOSPADM

## 2020-01-01 RX ORDER — NITROGLYCERIN 20 MG/100ML
5-200 INJECTION INTRAVENOUS
Status: DISCONTINUED | OUTPATIENT
Start: 2020-01-01 | End: 2020-01-01

## 2020-01-01 RX ORDER — ASPIRIN 81 MG/1
81 TABLET, CHEWABLE ORAL DAILY
Status: DISCONTINUED | OUTPATIENT
Start: 2020-01-01 | End: 2020-01-01 | Stop reason: HOSPADM

## 2020-01-01 RX ORDER — POTASSIUM CHLORIDE 750 MG/1
40 CAPSULE, EXTENDED RELEASE ORAL AS NEEDED
Status: DISCONTINUED | OUTPATIENT
Start: 2020-01-01 | End: 2020-01-01

## 2020-01-01 RX ORDER — CARVEDILOL 3.12 MG/1
3.12 TABLET ORAL EVERY 12 HOURS SCHEDULED
Status: DISCONTINUED | OUTPATIENT
Start: 2020-01-01 | End: 2020-01-01 | Stop reason: HOSPADM

## 2020-01-01 RX ORDER — POTASSIUM CHLORIDE 1.5 G/1.77G
40 POWDER, FOR SOLUTION ORAL AS NEEDED
Status: DISCONTINUED | OUTPATIENT
Start: 2020-01-01 | End: 2020-01-01

## 2020-01-01 RX ORDER — ACETAMINOPHEN 325 MG/1
650 TABLET ORAL EVERY 4 HOURS PRN
Status: DISCONTINUED | OUTPATIENT
Start: 2020-01-01 | End: 2020-01-01 | Stop reason: HOSPADM

## 2020-01-01 RX ORDER — MAGNESIUM SULFATE 1 G/100ML
1 INJECTION INTRAVENOUS AS NEEDED
Status: DISCONTINUED | OUTPATIENT
Start: 2020-01-01 | End: 2020-01-01 | Stop reason: HOSPADM

## 2020-01-01 RX ORDER — IPRATROPIUM BROMIDE AND ALBUTEROL SULFATE 2.5; .5 MG/3ML; MG/3ML
3 SOLUTION RESPIRATORY (INHALATION) EVERY 4 HOURS PRN
Qty: 360 ML
Start: 2020-01-01

## 2020-01-01 RX ORDER — HYDROMORPHONE HYDROCHLORIDE 1 MG/ML
0.25 INJECTION, SOLUTION INTRAMUSCULAR; INTRAVENOUS; SUBCUTANEOUS
Start: 2020-01-01 | End: 2020-02-06

## 2020-01-01 RX ORDER — PHENAZOPYRIDINE HYDROCHLORIDE 100 MG/1
100 TABLET, FILM COATED ORAL
Start: 2020-01-01

## 2020-01-01 RX ORDER — HALOPERIDOL 5 MG/ML
0.5 INJECTION INTRAMUSCULAR EVERY 6 HOURS PRN
Status: DISCONTINUED | OUTPATIENT
Start: 2020-01-01 | End: 2020-01-01 | Stop reason: HOSPADM

## 2020-01-01 RX ORDER — ACETAMINOPHEN 650 MG/1
650 SUPPOSITORY RECTAL EVERY 4 HOURS PRN
Status: DISCONTINUED | OUTPATIENT
Start: 2020-01-01 | End: 2020-01-01 | Stop reason: HOSPADM

## 2020-01-01 RX ORDER — MAGNESIUM SULFATE HEPTAHYDRATE 40 MG/ML
2 INJECTION, SOLUTION INTRAVENOUS AS NEEDED
Status: DISCONTINUED | OUTPATIENT
Start: 2020-01-01 | End: 2020-01-01 | Stop reason: HOSPADM

## 2020-01-01 RX ORDER — PSEUDOEPHEDRINE HCL 30 MG
100 TABLET ORAL 2 TIMES DAILY PRN
Start: 2020-01-01

## 2020-01-01 RX ADMIN — SULFUR HEXAFLUORIDE 2 ML: KIT at 11:16

## 2020-01-01 RX ADMIN — SODIUM CHLORIDE, PRESERVATIVE FREE 10 ML: 5 INJECTION INTRAVENOUS at 21:09

## 2020-01-01 RX ADMIN — MELATONIN TAB 5 MG 5 MG: 5 TAB at 20:52

## 2020-01-01 RX ADMIN — RIVASTIGMINE TRANSDERMAL SYSTEM 1 PATCH: 4.6 PATCH, EXTENDED RELEASE TRANSDERMAL at 09:41

## 2020-01-01 RX ADMIN — ASPIRIN 81 MG 162 MG: 81 TABLET ORAL at 00:19

## 2020-01-01 RX ADMIN — MELATONIN TAB 5 MG 5 MG: 5 TAB at 21:52

## 2020-01-01 RX ADMIN — RIVASTIGMINE TRANSDERMAL SYSTEM 1 PATCH: 4.6 PATCH, EXTENDED RELEASE TRANSDERMAL at 08:11

## 2020-01-01 RX ADMIN — BUMETANIDE 2 MG: 0.25 INJECTION INTRAMUSCULAR; INTRAVENOUS at 12:12

## 2020-01-01 RX ADMIN — MORPHINE SULFATE 1 MG: 2 INJECTION, SOLUTION INTRAMUSCULAR; INTRAVENOUS at 23:38

## 2020-01-01 RX ADMIN — QUETIAPINE FUMARATE 25 MG: 25 TABLET ORAL at 20:52

## 2020-01-01 RX ADMIN — ATORVASTATIN CALCIUM 80 MG: 40 TABLET, FILM COATED ORAL at 20:10

## 2020-01-01 RX ADMIN — IPRATROPIUM BROMIDE AND ALBUTEROL SULFATE 3 ML: 2.5; .5 SOLUTION RESPIRATORY (INHALATION) at 16:10

## 2020-01-01 RX ADMIN — CARVEDILOL 3.12 MG: 3.12 TABLET, FILM COATED ORAL at 20:52

## 2020-01-01 RX ADMIN — ATORVASTATIN CALCIUM 80 MG: 40 TABLET, FILM COATED ORAL at 21:58

## 2020-01-01 RX ADMIN — HYDROMORPHONE HYDROCHLORIDE 0.25 MG: 1 INJECTION, SOLUTION INTRAMUSCULAR; INTRAVENOUS; SUBCUTANEOUS at 22:23

## 2020-01-01 RX ADMIN — OLANZAPINE 2.5 MG: 10 INJECTION, POWDER, FOR SOLUTION INTRAMUSCULAR at 05:05

## 2020-01-01 RX ADMIN — CARVEDILOL 3.12 MG: 3.12 TABLET, FILM COATED ORAL at 09:23

## 2020-01-01 RX ADMIN — SODIUM CHLORIDE, PRESERVATIVE FREE 10 ML: 5 INJECTION INTRAVENOUS at 22:01

## 2020-01-01 RX ADMIN — FOLIC ACID 1 MG: 1 TABLET ORAL at 09:42

## 2020-01-01 RX ADMIN — ASPIRIN 81 MG CHEWABLE TABLET 81 MG: 81 TABLET CHEWABLE at 08:10

## 2020-01-01 RX ADMIN — CARVEDILOL 3.12 MG: 3.12 TABLET, FILM COATED ORAL at 20:09

## 2020-01-01 RX ADMIN — SODIUM CHLORIDE, PRESERVATIVE FREE 10 ML: 5 INJECTION INTRAVENOUS at 20:51

## 2020-01-01 RX ADMIN — CARVEDILOL 3.12 MG: 3.12 TABLET, FILM COATED ORAL at 08:10

## 2020-01-01 RX ADMIN — RIVASTIGMINE TRANSDERMAL SYSTEM 1 PATCH: 4.6 PATCH, EXTENDED RELEASE TRANSDERMAL at 10:04

## 2020-01-01 RX ADMIN — CARVEDILOL 3.12 MG: 3.12 TABLET, FILM COATED ORAL at 09:42

## 2020-01-01 RX ADMIN — SODIUM CHLORIDE, PRESERVATIVE FREE 10 ML: 5 INJECTION INTRAVENOUS at 12:02

## 2020-01-01 RX ADMIN — FOLIC ACID 1 MG: 1 TABLET ORAL at 09:23

## 2020-01-01 RX ADMIN — PHENAZOPYRIDINE HYDROCHLORIDE 100 MG: 100 TABLET ORAL at 17:35

## 2020-01-01 RX ADMIN — BUMETANIDE 2 MG: 0.25 INJECTION INTRAMUSCULAR; INTRAVENOUS at 16:15

## 2020-01-01 RX ADMIN — PHENAZOPYRIDINE HYDROCHLORIDE 100 MG: 100 TABLET ORAL at 09:23

## 2020-01-01 RX ADMIN — HALOPERIDOL LACTATE 0.5 MG: 5 INJECTION INTRAMUSCULAR at 21:56

## 2020-01-01 RX ADMIN — CARVEDILOL 3.12 MG: 3.12 TABLET, FILM COATED ORAL at 09:49

## 2020-01-01 RX ADMIN — POTASSIUM CHLORIDE 40 MEQ: 750 CAPSULE, EXTENDED RELEASE ORAL at 20:53

## 2020-01-01 RX ADMIN — ATORVASTATIN CALCIUM 80 MG: 40 TABLET, FILM COATED ORAL at 20:52

## 2020-01-01 RX ADMIN — RIVASTIGMINE TRANSDERMAL SYSTEM 1 PATCH: 4.6 PATCH, EXTENDED RELEASE TRANSDERMAL at 12:01

## 2020-01-01 RX ADMIN — ALBUMIN HUMAN 25 G: 0.25 SOLUTION INTRAVENOUS at 12:35

## 2020-01-01 RX ADMIN — ASPIRIN 81 MG CHEWABLE TABLET 81 MG: 81 TABLET CHEWABLE at 11:59

## 2020-01-01 RX ADMIN — SODIUM CHLORIDE, PRESERVATIVE FREE 10 ML: 5 INJECTION INTRAVENOUS at 20:10

## 2020-01-01 RX ADMIN — NITROGLYCERIN 5 MCG/MIN: 20 INJECTION INTRAVENOUS at 02:13

## 2020-01-01 RX ADMIN — CARVEDILOL 3.12 MG: 3.12 TABLET, FILM COATED ORAL at 20:53

## 2020-01-01 RX ADMIN — CARVEDILOL 3.12 MG: 3.12 TABLET, FILM COATED ORAL at 11:59

## 2020-01-01 RX ADMIN — ATORVASTATIN CALCIUM 80 MG: 40 TABLET, FILM COATED ORAL at 21:08

## 2020-01-01 RX ADMIN — ATORVASTATIN CALCIUM 80 MG: 40 TABLET, FILM COATED ORAL at 20:53

## 2020-01-01 RX ADMIN — FOLIC ACID 1 MG: 1 TABLET ORAL at 12:01

## 2020-01-01 RX ADMIN — BUMETANIDE 1 MG: 0.25 INJECTION INTRAMUSCULAR; INTRAVENOUS at 00:19

## 2020-01-01 RX ADMIN — ALBUMIN HUMAN 25 G: 0.25 SOLUTION INTRAVENOUS at 16:15

## 2020-01-01 RX ADMIN — FOLIC ACID 1 MG: 1 TABLET ORAL at 09:49

## 2020-01-01 RX ADMIN — PHENAZOPYRIDINE HYDROCHLORIDE 100 MG: 100 TABLET ORAL at 14:29

## 2020-01-01 RX ADMIN — SODIUM CHLORIDE, PRESERVATIVE FREE 10 ML: 5 INJECTION INTRAVENOUS at 09:24

## 2020-01-01 RX ADMIN — SODIUM CHLORIDE, PRESERVATIVE FREE 10 ML: 5 INJECTION INTRAVENOUS at 08:10

## 2020-01-01 RX ADMIN — FOLIC ACID 1 MG: 1 TABLET ORAL at 08:10

## 2020-01-01 RX ADMIN — HALOPERIDOL LACTATE 0.5 MG: 5 INJECTION INTRAMUSCULAR at 21:16

## 2020-01-01 RX ADMIN — MELATONIN TAB 5 MG 5 MG: 5 TAB at 21:01

## 2020-01-01 RX ADMIN — BUMETANIDE 1 MG: 0.25 INJECTION INTRAMUSCULAR; INTRAVENOUS at 20:53

## 2020-01-01 RX ADMIN — SODIUM CHLORIDE, PRESERVATIVE FREE 10 ML: 5 INJECTION INTRAVENOUS at 20:53

## 2020-01-01 RX ADMIN — POTASSIUM CHLORIDE 40 MEQ: 1.5 POWDER, FOR SOLUTION ORAL at 00:36

## 2020-01-01 RX ADMIN — ASPIRIN 81 MG CHEWABLE TABLET 81 MG: 81 TABLET CHEWABLE at 09:23

## 2020-01-01 RX ADMIN — CARVEDILOL 3.12 MG: 3.12 TABLET, FILM COATED ORAL at 21:08

## 2020-01-01 RX ADMIN — RIVASTIGMINE TRANSDERMAL SYSTEM 1 PATCH: 4.6 PATCH, EXTENDED RELEASE TRANSDERMAL at 09:24

## 2020-01-01 RX ADMIN — CARVEDILOL 3.12 MG: 3.12 TABLET, FILM COATED ORAL at 21:58

## 2020-01-01 RX ADMIN — ASPIRIN 81 MG CHEWABLE TABLET 81 MG: 81 TABLET CHEWABLE at 09:49

## 2020-01-01 RX ADMIN — ASPIRIN 81 MG CHEWABLE TABLET 81 MG: 81 TABLET CHEWABLE at 09:42

## 2020-01-01 RX ADMIN — PHENAZOPYRIDINE HYDROCHLORIDE 100 MG: 100 TABLET ORAL at 18:22

## 2020-01-23 PROBLEM — R79.1 SUPRATHERAPEUTIC INR: Status: ACTIVE | Noted: 2020-01-01

## 2020-01-23 PROBLEM — I50.9 CHF (CONGESTIVE HEART FAILURE) (HCC): Status: ACTIVE | Noted: 2020-01-01

## 2020-01-23 PROBLEM — D64.9 ANEMIA: Status: ACTIVE | Noted: 2020-01-01

## 2020-01-23 PROBLEM — I25.10 CAD (CORONARY ARTERY DISEASE): Status: ACTIVE | Noted: 2020-01-01

## 2020-01-23 PROBLEM — R94.31 PROLONGED QT INTERVAL: Status: ACTIVE | Noted: 2020-01-01

## 2020-01-23 PROBLEM — E87.6 HYPOKALEMIA: Status: ACTIVE | Noted: 2020-01-01
